# Patient Record
Sex: FEMALE | Race: WHITE | NOT HISPANIC OR LATINO | Employment: OTHER | URBAN - METROPOLITAN AREA
[De-identification: names, ages, dates, MRNs, and addresses within clinical notes are randomized per-mention and may not be internally consistent; named-entity substitution may affect disease eponyms.]

---

## 2017-02-15 ENCOUNTER — OFFICE VISIT (OUTPATIENT)
Dept: CARDIOLOGY | Facility: MEDICAL CENTER | Age: 72
End: 2017-02-15
Payer: MEDICARE

## 2017-02-15 VITALS
HEART RATE: 88 BPM | BODY MASS INDEX: 24.73 KG/M2 | OXYGEN SATURATION: 91 % | SYSTOLIC BLOOD PRESSURE: 150 MMHG | DIASTOLIC BLOOD PRESSURE: 62 MMHG | WEIGHT: 131 LBS | HEIGHT: 61 IN

## 2017-02-15 DIAGNOSIS — J44.9 CHRONIC OBSTRUCTIVE PULMONARY DISEASE, UNSPECIFIED COPD TYPE (HCC): ICD-10-CM

## 2017-02-15 DIAGNOSIS — I10 ESSENTIAL HYPERTENSION: ICD-10-CM

## 2017-02-15 DIAGNOSIS — Z82.49 FAMILY HISTORY OF EARLY CAD: ICD-10-CM

## 2017-02-15 PROCEDURE — 1036F TOBACCO NON-USER: CPT | Performed by: INTERNAL MEDICINE

## 2017-02-15 PROCEDURE — G8484 FLU IMMUNIZE NO ADMIN: HCPCS | Performed by: INTERNAL MEDICINE

## 2017-02-15 PROCEDURE — G8432 DEP SCR NOT DOC, RNG: HCPCS | Performed by: INTERNAL MEDICINE

## 2017-02-15 PROCEDURE — 3017F COLORECTAL CA SCREEN DOC REV: CPT | Mod: 8P | Performed by: INTERNAL MEDICINE

## 2017-02-15 PROCEDURE — 1100F PTFALLS ASSESS-DOCD GE2>/YR: CPT | Performed by: INTERNAL MEDICINE

## 2017-02-15 PROCEDURE — G8420 CALC BMI NORM PARAMETERS: HCPCS | Performed by: INTERNAL MEDICINE

## 2017-02-15 PROCEDURE — 4040F PNEUMOC VAC/ADMIN/RCVD: CPT | Performed by: INTERNAL MEDICINE

## 2017-02-15 PROCEDURE — 3045F PR MOST RECENT HEMOGLOBIN A1C LEVEL 7.0-9.0%: CPT | Performed by: INTERNAL MEDICINE

## 2017-02-15 PROCEDURE — 0518F FALL PLAN OF CARE DOCD: CPT | Mod: 8P | Performed by: INTERNAL MEDICINE

## 2017-02-15 PROCEDURE — 3014F SCREEN MAMMO DOC REV: CPT | Performed by: INTERNAL MEDICINE

## 2017-02-15 PROCEDURE — 99214 OFFICE O/P EST MOD 30 MIN: CPT | Performed by: INTERNAL MEDICINE

## 2017-02-15 PROCEDURE — 3288F FALL RISK ASSESSMENT DOCD: CPT | Performed by: INTERNAL MEDICINE

## 2017-02-15 RX ORDER — AMLODIPINE BESYLATE 10 MG/1
10 TABLET ORAL DAILY
Qty: 30 TAB | Refills: 11 | Status: SHIPPED | OUTPATIENT
Start: 2017-02-15 | End: 2017-07-19

## 2017-02-15 ASSESSMENT — ENCOUNTER SYMPTOMS
SHORTNESS OF BREATH: 0
LOSS OF CONSCIOUSNESS: 0
CLAUDICATION: 0
EYE PAIN: 0
EYE DISCHARGE: 0
WEIGHT LOSS: 0
BLURRED VISION: 0
DEPRESSION: 0
PND: 0
NAUSEA: 0
HALLUCINATIONS: 0
HEADACHES: 0
FALLS: 0
MYALGIAS: 0
DIZZINESS: 0
DOUBLE VISION: 0
SPEECH CHANGE: 0
CHILLS: 0
ABDOMINAL PAIN: 0
VOMITING: 0
ORTHOPNEA: 0
BLOOD IN STOOL: 0
PALPITATIONS: 0
BRUISES/BLEEDS EASILY: 0
COUGH: 0
FEVER: 0
SENSORY CHANGE: 0

## 2017-02-15 NOTE — Clinical Note
"     [facility]   [address], [address]  [place] [address]  Phone: [phone]  Fax: [phone]              Kenia Oconnell  1945    Encounter Date: 2/15/2017    Elyse Flores M.D.          PROGRESS NOTE:  Subjective:   Kenia Oconnell is a 71 y.o. female who presents today for cardiac care of CAD, HTN, HLP, DM.  In the interim, patient has been doing well without having any symptoms. Patient denies having chest pain, dyspnea, palpitation, presyncope, syncope episodes. Able to climb up at least 2 flights of stairs.     Past Medical History   Diagnosis Date   • Type II or unspecified type diabetes mellitus without mention of complication, not stated as uncontrolled 6/11/2009   • HTN 6/11/2009   • Hyperlipidemia LDL goal < 100 6/11/2009   • Asthma, exogenous 6/11/2009   • Depression 6/11/2009   • History of Gout when on HCTZ 6/11/2009   • Glaucoma suspect 6/11/2009   • Hyperplastic colon polyp 12/7/2007   • Cervical High Risk HPV Test Positive 10/18/2006   • COPD (chronic obstructive pulmonary disease) (CMS-[facility]) 6/11/2009   • Vitamin d deficiency 4/2/2010   • Personal history of allergy to eggs 4/2/2010   • Allergic rhinitis 4/19/2010   • Hypertriglyceridemia 3/30/2012   • HDL deficiency 3/30/2012   • DDD (degenerative disc disease), lumbar 8/14/2013   • Abnormal stress electrocardiogram test using treadmill 8/11/2014   • Bilateral ocular hypertension 7/6/2015   • Posterior vitreous detachment of right eye 7/6/2015     Past Surgical History   Procedure Laterality Date   • Tonsillectomy  1953   • Sinusotomies  1999   • Dilation and curettage  1981   • Eye surgery  1997     \"laser for eye pressure\"   • Eye surgery Bilateral 2013     Dr. Garrido     Family History   Problem Relation Age of Onset   • Heart Disease Father      d. MI 50s   • Lung Disease Mother      d. lung dz   • Cancer Paternal Grandfather      Black Lung   • Diabetes Paternal Grandmother   "   • Diabetes Other      Paternal Cousin DM1   • Heart Disease Sister 59     mi and CVA -      History   Smoking status   • Former Smoker -- 1.00 packs/day for 29 years   • Types: Cigarettes   • Start date: 01/01/1966   • Quit date: 01/01/1995   Smokeless tobacco   • Former User     Allergies   Allergen Reactions   • Pcn [Penicillins] Rash   • Sulfa Drugs Rash   • Toprol Xl [Metoprolol]      Fatigue       Outpatient Encounter Prescriptions as of 2/15/2017   Medication Sig Dispense Refill   • amlodipine (NORVASC) 10 MG Tab Take 1 Tab by mouth every day. 30 Tab 11   • JANUVIA 100 MG Tab TAKE ONE TABLET BY MOUTH DAILY 90 Tab 1   • sertraline (ZOLOFT) 100 MG Tab TAKE ONE TABLET BY MOUTH DAILY 90 Tab 1   • clindamycin (CLEOCIN) 300 MG Cap      • hydrocodone-acetaminophen (NORCO) 5-325 MG Tab per tablet Take 1 Tab by mouth every 6 hours as needed. 15 Tab 0   • lisinopril (PRINIVIL, ZESTRIL) 40 MG tablet Take 1 Tab by mouth every day. 90 Tab 2   • glucose blood (ONE TOUCH ULTRA TEST) strip USE TO TEST BLOOD SUGAR DAILY 100 Strip 11   • ranitidine (ZANTAC) 150 MG Tab Take 150 mg by mouth 2 times a day.     • diltiazem CD (CARDIZEM CD) 300 MG CAPSULE SR 24 HR Take 1 Cap by mouth every day. 90 Cap 2   • CRESTOR 10 MG Tab TAKE 1 TABLET BY MOUTH DAILY. 30 Tab 5   • cyclobenzaprine (FLEXERIL) 10 MG Tab TAKE 1/2 TO 1 TABLET AT BEDTIME AS NEEDED FOR PAIN/SPASMS.  30 Tab 5   • Mometasone Furo-Formoterol Fum (DULERA) 100-5 MCG/ACT Aerosol Inhale 1 Puff by mouth 2 Times a Day. 3 Inhaler 3   • albuterol (VENTOLIN OR PROVENTIL) 108 (90 BASE) MCG/ACT Aero Soln inhalation aerosol Inhale 1-2 Puffs by mouth every 6 hours as needed for Shortness of Breath. 1 Inhaler 3   • metformin ER (GLUCOPHAGE XR) 500 MG TB24 TAKE 2 TABLETS BY MOUTH TWICE DAILY.  (Patient taking differently: taking 4 tabs in one time per day) 60 Tab 5   • Ibuprofen (ADVIL) 200 MG CAPS Take  by mouth.     • LIFESCAN FINEPOINT LANCETS MISC USE TO TEST BLOOD SUGAR DAILY 100  Each 11   • fluticasone (FLONASE) 50 MCG/ACT nasal spray USE ONE SPRAY IN EACH NOSTRIL DAILY 1 Bottle 5   • clobetasol (TEMOVATE) 0.05 % OINT Apply  to affected area(s). Apply twice daily to affected area for 1 week then once daily for 1 week then as needed for rash 30 g 0   • cetirizine (ZYRTEC) 10 MG TABS Take 10 mg by mouth every day.       • vitamin D (CHOLECALCIFEROL) 1000 UNIT TABS Take 1,000 Units by mouth every day.     • ASPIRIN 81 MG PO TBEC Take 1 Tab by mouth every day. 30 Each 11   • rosuvastatin (CRESTOR) 10 MG Tab      • [DISCONTINUED] metoprolol SR (TOPROL XL) 50 MG TABLET SR 24 HR Take 1 Tab by mouth every day. (Patient not taking: Reported on 2/15/2017) 30 Tab 11   • metformin ER (GLUCOPHAGE XR) 500 MG TABLET SR 24 HR TAKE FOUR TABLETS (2000MG)  BY MOUTH DAILY 360 Tab 2   • glipiZIDE (GLUCOTROL) 10 MG Tab Take 1 Tab by mouth 2 times a day. 180 Tab 2   • NON SPECIFIED by Other route. 1. Lancettes. Use daily #100 RF x 1 year  2. Glucometer test strips. Use daily #100 RF x 1 year.  Dx: 250.00 100 Each 1     No facility-administered encounter medications on file as of 2/15/2017.     Review of Systems   Constitutional: Negative for fever, chills, weight loss and malaise/fatigue.   HENT: Negative for ear discharge, ear pain, hearing loss and nosebleeds.    Eyes: Negative for blurred vision, double vision, pain and discharge.   Respiratory: Negative for cough and shortness of breath.    Cardiovascular: Negative for chest pain, palpitations, orthopnea, claudication, leg swelling and PND.   Gastrointestinal: Negative for nausea, vomiting, abdominal pain, blood in stool and melena.   Genitourinary: Negative for dysuria and hematuria.   Musculoskeletal: Negative for myalgias, joint pain and falls.   Skin: Negative for itching and rash.   Neurological: Negative for dizziness, sensory change, speech change, loss of consciousness and headaches.   Endo/Heme/Allergies: Negative for environmental allergies. Does  "not bruise/bleed easily.   Psychiatric/Behavioral: Negative for depression, suicidal ideas and hallucinations.        Objective:   /62 mmHg  Pulse 88  Ht 1.549 m (5' 0.98\")  Wt 59.421 kg (131 lb)  BMI 24.76 kg/m2  SpO2 91%  LMP 01/01/2000    Physical Exam   Constitutional: She is oriented to person, place, and time. She appears well-developed and well-nourished.   HENT:   Head: Normocephalic and atraumatic.   Eyes: EOM are normal.   Neck: No JVD present.   Cardiovascular: Normal rate, regular rhythm, normal heart sounds and intact distal pulses.  Exam reveals no gallop and no friction rub.    No murmur heard.  Pulmonary/Chest: No respiratory distress. She has no wheezes. She has no rales. She exhibits no tenderness.   Abdominal: She exhibits no distension. There is no tenderness. There is no rebound and no guarding.   Musculoskeletal: She exhibits no edema or tenderness.   Lymphadenopathy:     She has no cervical adenopathy.   Neurological: She is alert and oriented to person, place, and time.   Skin: Skin is dry.   Psychiatric: She has a normal mood and affect.   Nursing note and vitals reviewed.      Assessment:     1. Essential hypertension  amlodipine (NORVASC) 10 MG Tab   2. Family history of early CAD  amlodipine (NORVASC) 10 MG Tab   3. Chronic obstructive pulmonary disease, unspecified COPD type (CMS-HCC)  amlodipine (NORVASC) 10 MG Tab   4. Uncontrolled type 2 diabetes mellitus with other specified complication (CMS-HCC)         Medical Decision Making:  Today's Assessment / Status / Plan:     Blood pressure is elevated.  Patient was not able to tolerate Toprol-XL therapy.  We'll start patient on amlodipine 10 mg by mouth once a day.  Continue lisinopril therapy.  Optimize diabetes control with primary care provider.    I will see patient back in clinic with lab tests and studies results in 6 months.    I thank you Dr. Livingston for referring patient to our Cardiology Clinic today.        Cailin DIGGS" CADY Livingston  [address]  [place] [id number]  VIA In Basket

## 2017-02-15 NOTE — MR AVS SNAPSHOT
"        Kenia Boucher Anish   2/15/2017 2:45 PM   Office Visit   MRN: [id number]    Department:  [facility]   Dept Phone:  [phone]    Description:  Female : 1945   Provider:  Elyse Flores M.D.           Reason for Visit     New Patient           Allergies as of 2/15/2017     Allergen Noted Reactions    Pcn [Penicillins] 2009   Rash    Sulfa Drugs 2009   Rash    Toprol Xl [Metoprolol] 02/15/2017       Fatigue        You were diagnosed with     Essential hypertension   [5456596]       Family history of early CAD   [291350]       Chronic obstructive pulmonary disease, unspecified COPD type (CMS-HCC)   [6133875]       Uncontrolled type 2 diabetes mellitus with other specified complication (CMS-HCC)   [0178288]         Vital Signs     Blood Pressure Pulse Height Weight Body Mass Index Oxygen Saturation    150/62 mmHg 88 1.549 m (5' 0.98\") 59.421 kg (131 lb) 24.76 kg/m2 91%    Last Menstrual Period Smoking Status                2000 Former Smoker          Basic Information     Date Of Birth Sex Race Ethnicity Preferred Language    1945 Female White Non- English      Problem List              ICD-10-CM Priority Class Noted - Resolved    Preventative health care Z00.00   2009 - Present    DM (diabetes mellitus), type 2, uncontrolled (CMS-HCC) E11.65   2009 - Present    Hyperlipidemia with target LDL less than 100 E78.5   2009 - Present    COPD (chronic obstructive pulmonary disease) (CMS-HCC) J44.9   2009 - Present    Major depressive disorder F32.9   2009 - Present    History of Gout when on HCTZ Z87.39   2009 - Present    Glaucoma suspect H40.009   2009 - Present    Hyperplastic colon polyp K63.5   2007 - Present    Cervical High Risk HPV Test Positive R87.810   10/18/2006 - Present    HTN (hypertension) I10   2009 - Present    Vitamin D insufficiency E55.9   2010 - Present    Seasonal allergic rhinitis J30.2   " 4/19/2010 - Present    Lichen sclerosus et atrophicus of the vulva N90.4   4/20/2011 - Present    Hypertriglyceridemia E78.1   3/30/2012 - Present    HDL deficiency E78.6   3/30/2012 - Present    DDD (degenerative disc disease), lumbar M51.36   8/14/2013 - Present    Abnormal CXR R93.8   11/22/2013 - Present    Abnormal cardiovascular stress test R94.39   8/6/2014 - Present    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   7/6/2015 - Present    Posterior vitreous detachment of right eye H43.811   7/6/2015 - Present    DM type 2 with diabetic dyslipidemia (CMS-HCC) E11.69, E78.5   2/23/2016 - Present    Elevated TSH R94.6   7/1/2016 - Present      Health Maintenance        Date Due Completion Dates    IMM INFLUENZA (1) 9/1/2016 10/22/2015, 10/13/2014, 10/22/2013, 11/12/2012, 1/5/2011    A1C SCREENING 12/27/2016 6/27/2016, 5/3/2016, 2/16/2016, 6/18/2015, 2/27/2015, 2/19/2015, 10/20/2014, 6/23/2014, 3/21/2014, 8/7/2013, 5/6/2013, 10/30/2012, 7/11/2011, 12/27/2010, 6/23/2010, 3/23/2010, 12/21/2009, 6/9/2009, 12/9/2008    RETINAL SCREENING 2/3/2017 2/3/2016, 3/11/2014    FASTING LIPID PROFILE 2/16/2017 2/16/2016, 6/18/2015, 10/20/2014, 3/21/2014, 11/14/2013, 10/30/2012, 3/26/2012, 7/11/2011, 6/23/2010, 12/21/2009, 6/9/2009, 12/9/2008    URINE ACR / MICROALBUMIN 2/16/2017 2/16/2016, 2/19/2015, 3/21/2014, 8/7/2013, 5/6/2013, 3/26/2012, 7/11/2011, 6/23/2010, 12/21/2009, 6/9/2009    SERUM CREATININE 2/16/2017 2/16/2016, 2/16/2016, 6/18/2015, 2/19/2015, 10/20/2014, 3/21/2014, 11/25/2013, 11/15/2013, 5/6/2013, 10/30/2012, 3/26/2012, 7/11/2011, 12/27/2010, 3/23/2010, 6/9/2009, 12/9/2008    DIABETES MONOFILAMENT / LE EXAM 2/23/2017 2/23/2016, 6/26/2015, 3/31/2014, 3/31/2014 (Done), 5/14/2013 (Done), 11/12/2012 (Done), 3/30/2012 (Done)    Override on 3/31/2014: Done    Override on 5/14/2013: Done    Override on 11/12/2012: Done    Override on 3/30/2012: Done    COLONOSCOPY 12/7/2017 12/7/2007 (Done)    Override on 12/7/2007: Done     MAMMOGRAM 5/12/2018 5/12/2016, 7/12/2013, 8/9/2011, 7/13/2010, 7/13/2010, 7/9/2009, 7/9/2009    BONE DENSITY 7/12/2018 7/12/2013, 7/9/2009    IMM DTaP/Tdap/Td Vaccine (2 - Td) 2/27/2025 2/27/2015            Current Immunizations     13-VALENT PCV PREVNAR 5/9/2016    Influenza TIV (IM) 10/22/2013, 11/12/2012, 1/5/2011    Influenza Vaccine Adult HD 10/22/2015    Influenza Vaccine Quad Inj (Pf) 10/13/2014    Pneumococcal polysaccharide vaccine (PPSV-23) 11/12/2012    SHINGLES VACCINE 7/2/2010    Tdap Vaccine 2/27/2015      Below and/or attached are the medications your provider expects you to take. Review all of your home medications and newly ordered medications with your provider and/or pharmacist. Follow medication instructions as directed by your provider and/or pharmacist. Please keep your medication list with you and share with your provider. Update the information when medications are discontinued, doses are changed, or new medications (including over-the-counter products) are added; and carry medication information at all times in the event of emergency situations     Allergies:  PCN - Rash     SULFA DRUGS - Rash     TOPROL XL - (reactions not documented)               Medications  Valid as of: February 15, 2017 -  2:56 PM    Generic Name Brand Name Tablet Size Instructions for use    Albuterol Sulfate (Aero Soln) albuterol 108 (90 BASE) MCG/ACT Inhale 1-2 Puffs by mouth every 6 hours as needed for Shortness of Breath.        AmLODIPine Besylate (Tab) NORVASC 10 MG Take 1 Tab by mouth every day.        Aspirin (Tablet Delayed Response) aspirin 81 MG Take 1 Tab by mouth every day.        Cetirizine HCl (Tab) ZYRTEC 10 MG Take 10 mg by mouth every day.          Cholecalciferol (Tab) cholecalciferol 1000 UNIT Take 1,000 Units by mouth every day.        Clindamycin HCl (Cap) CLEOCIN 300 MG         Clobetasol Propionate (Ointment) TEMOVATE 0.05 % Apply  to affected area(s). Apply twice daily to affected area for 1  week then once daily for 1 week then as needed for rash        Cyclobenzaprine HCl (Tab) FLEXERIL 10 MG TAKE 1/2 TO 1 TABLET AT BEDTIME AS NEEDED FOR PAIN/SPASMS.         DiltiaZEM HCl Coated Beads (CAPSULE SR 24 HR) CARDIZEM  MG Take 1 Cap by mouth every day.        Fluticasone Propionate (Suspension) FLONASE 50 MCG/ACT USE ONE SPRAY IN EACH NOSTRIL DAILY        GlipiZIDE (Tab) GLUCOTROL 10 MG Take 1 Tab by mouth 2 times a day.        Glucose Blood (Strip) glucose blood  USE TO TEST BLOOD SUGAR DAILY        Hydrocodone-Acetaminophen (Tab) NORCO 5-325 MG Take 1 Tab by mouth every 6 hours as needed.        Ibuprofen (Cap) Ibuprofen 200 MG Take  by mouth.        Lancets (Misc) [employer] LANCETS  USE TO TEST BLOOD SUGAR DAILY        Lisinopril (Tab) PRINIVIL, ZESTRIL 40 MG Take 1 Tab by mouth every day.        MetFORMIN HCl (TABLET SR 24 HR) GLUCOPHAGE  MG TAKE 2 TABLETS BY MOUTH TWICE DAILY.         MetFORMIN HCl (TABLET SR 24 HR) GLUCOPHAGE  MG TAKE FOUR TABLETS (2000MG)  BY MOUTH DAILY        Mometasone Furo-Formoterol Fum (Aerosol) Mometasone Furo-Formoterol Fum 100-5 MCG/ACT Inhale 1 Puff by mouth 2 Times a Day.        NON SPECIFIED   by Other route. 1. Lancettes. Use daily #100 RF x 1 year  2. Glucometer test strips. Use daily #100 RF x 1 year.  Dx: 250.00        RaNITidine HCl (Tab) ZANTAC 150 MG Take 150 mg by mouth 2 times a day.        Rosuvastatin Calcium (Tab) CRESTOR 10 MG TAKE 1 TABLET BY MOUTH DAILY.        Rosuvastatin Calcium (Tab) CRESTOR 10 MG         Sertraline HCl (Tab) ZOLOFT 100 MG TAKE ONE TABLET BY MOUTH DAILY        SITagliptin Phosphate (Tab) JANUVIA 100 MG TAKE ONE TABLET BY MOUTH DAILY        .                 Medicines prescribed today were sent to:     [id number] - [place] - [address]    [address] [address]    Phone: [phone] Fax: [phone]    Open 24 Hours?: No    [facility] - [facility]  [address]    [address] [place] [address]    Phone: [phone] Fax: [phone]    Open 24 Hours?: No      Medication refill instructions:       If your prescription bottle indicates you have medication refills left, it is not necessary to call your provider’s office. Please contact your pharmacy and they will refill your medication.    If your prescription bottle indicates you do not have any refills left, you may request refills at any time through one of the following ways: The online [employer] system (except Urgent Care), by calling your provider’s office, or by asking your pharmacy to contact your provider’s office with a refill request. Medication refills are processed only during regular business hours and may not be available until the next business day. Your provider may request additional information or to have a follow-up visit with you prior to refilling your medication.   *Please Note: Medication refills are assigned a new Rx number when refilled electronically. Your pharmacy may indicate that no refills were authorized even though a new prescription for the same medication is available at the pharmacy. Please request the medicine by name with the pharmacy before contacting your provider for a refill.        Other Notes About Your Plan                    [employer] Access Code: Activation code not generated  Current [employer] Status: Active

## 2017-02-15 NOTE — PROGRESS NOTES
"Subjective:   Kenia Oconnell is a 71 y.o. female who presents today for cardiac care of CAD, HTN, HLP, DM.  In the interim, patient has been doing well without having any symptoms. Patient denies having chest pain, dyspnea, palpitation, presyncope, syncope episodes. Able to climb up at least 2 flights of stairs.     Past Medical History   Diagnosis Date   • Type II or unspecified type diabetes mellitus without mention of complication, not stated as uncontrolled 6/11/2009   • HTN 6/11/2009   • Hyperlipidemia LDL goal < 100 6/11/2009   • Asthma, exogenous 6/11/2009   • Depression 6/11/2009   • History of Gout when on HCTZ 6/11/2009   • Glaucoma suspect 6/11/2009   • Hyperplastic colon polyp 12/7/2007   • Cervical High Risk HPV Test Positive 10/18/2006   • COPD (chronic obstructive pulmonary disease) (CMS-[facility]) 6/11/2009   • Vitamin d deficiency 4/2/2010   • Personal history of allergy to eggs 4/2/2010   • Allergic rhinitis 4/19/2010   • Hypertriglyceridemia 3/30/2012   • HDL deficiency 3/30/2012   • DDD (degenerative disc disease), lumbar 8/14/2013   • Abnormal stress electrocardiogram test using treadmill 8/11/2014   • Bilateral ocular hypertension 7/6/2015   • Posterior vitreous detachment of right eye 7/6/2015     Past Surgical History   Procedure Laterality Date   • Tonsillectomy  1953   • Sinusotomies  1999   • Dilation and curettage  1981   • Eye surgery  1997     \"laser for eye pressure\"   • Eye surgery Bilateral 2013     Dr. Garrido     Family History   Problem Relation Age of Onset   • Heart Disease Father      d. MI 50s   • Lung Disease Mother      d. lung dz   • Cancer Paternal Grandfather      Black Lung   • Diabetes Paternal Grandmother    • Diabetes Other      Paternal Cousin DM1   • Heart Disease Sister 59     mi and CVA -      History   Smoking status   • Former Smoker -- 1.00 packs/day for 29 years   • Types: Cigarettes   • Start date: 01/01/1966   • Quit date: 01/01/1995   Smokeless tobacco   • Former " User     Allergies   Allergen Reactions   • Pcn [Penicillins] Rash   • Sulfa Drugs Rash   • Toprol Xl [Metoprolol]      Fatigue       Outpatient Encounter Prescriptions as of 2/15/2017   Medication Sig Dispense Refill   • amlodipine (NORVASC) 10 MG Tab Take 1 Tab by mouth every day. 30 Tab 11   • JANUVIA 100 MG Tab TAKE ONE TABLET BY MOUTH DAILY 90 Tab 1   • sertraline (ZOLOFT) 100 MG Tab TAKE ONE TABLET BY MOUTH DAILY 90 Tab 1   • clindamycin (CLEOCIN) 300 MG Cap      • hydrocodone-acetaminophen (NORCO) 5-325 MG Tab per tablet Take 1 Tab by mouth every 6 hours as needed. 15 Tab 0   • lisinopril (PRINIVIL, ZESTRIL) 40 MG tablet Take 1 Tab by mouth every day. 90 Tab 2   • glucose blood (ONE TOUCH ULTRA TEST) strip USE TO TEST BLOOD SUGAR DAILY 100 Strip 11   • ranitidine (ZANTAC) 150 MG Tab Take 150 mg by mouth 2 times a day.     • diltiazem CD (CARDIZEM CD) 300 MG CAPSULE SR 24 HR Take 1 Cap by mouth every day. 90 Cap 2   • CRESTOR 10 MG Tab TAKE 1 TABLET BY MOUTH DAILY. 30 Tab 5   • cyclobenzaprine (FLEXERIL) 10 MG Tab TAKE 1/2 TO 1 TABLET AT BEDTIME AS NEEDED FOR PAIN/SPASMS.  30 Tab 5   • Mometasone Furo-Formoterol Fum (DULERA) 100-5 MCG/ACT Aerosol Inhale 1 Puff by mouth 2 Times a Day. 3 Inhaler 3   • albuterol (VENTOLIN OR PROVENTIL) 108 (90 BASE) MCG/ACT Aero Soln inhalation aerosol Inhale 1-2 Puffs by mouth every 6 hours as needed for Shortness of Breath. 1 Inhaler 3   • metformin ER (GLUCOPHAGE XR) 500 MG TB24 TAKE 2 TABLETS BY MOUTH TWICE DAILY.  (Patient taking differently: taking 4 tabs in one time per day) 60 Tab 5   • Ibuprofen (ADVIL) 200 MG CAPS Take  by mouth.     • [employer] FINEPOINT LANCETS MISC USE TO TEST BLOOD SUGAR DAILY 100 Each 11   • fluticasone (FLONASE) 50 MCG/ACT nasal spray USE ONE SPRAY IN EACH NOSTRIL DAILY 1 Bottle 5   • clobetasol (TEMOVATE) 0.05 % OINT Apply  to affected area(s). Apply twice daily to affected area for 1 week then once daily for 1 week then as needed for rash 30 g 0  "  • cetirizine (ZYRTEC) 10 MG TABS Take 10 mg by mouth every day.       • vitamin D (CHOLECALCIFEROL) 1000 UNIT TABS Take 1,000 Units by mouth every day.     • ASPIRIN 81 MG PO TBEC Take 1 Tab by mouth every day. 30 Each 11   • rosuvastatin (CRESTOR) 10 MG Tab      • [DISCONTINUED] metoprolol SR (TOPROL XL) 50 MG TABLET SR 24 HR Take 1 Tab by mouth every day. (Patient not taking: Reported on 2/15/2017) 30 Tab 11   • metformin ER (GLUCOPHAGE XR) 500 MG TABLET SR 24 HR TAKE FOUR TABLETS (2000MG)  BY MOUTH DAILY 360 Tab 2   • glipiZIDE (GLUCOTROL) 10 MG Tab Take 1 Tab by mouth 2 times a day. 180 Tab 2   • NON SPECIFIED by Other route. 1. Lancettes. Use daily #100 RF x 1 year  2. Glucometer test strips. Use daily #100 RF x 1 year.  Dx: 250.00 100 Each 1     No facility-administered encounter medications on file as of 2/15/2017.     Review of Systems   Constitutional: Negative for fever, chills, weight loss and malaise/fatigue.   HENT: Negative for ear discharge, ear pain, hearing loss and nosebleeds.    Eyes: Negative for blurred vision, double vision, pain and discharge.   Respiratory: Negative for cough and shortness of breath.    Cardiovascular: Negative for chest pain, palpitations, orthopnea, claudication, leg swelling and PND.   Gastrointestinal: Negative for nausea, vomiting, abdominal pain, blood in stool and melena.   Genitourinary: Negative for dysuria and hematuria.   Musculoskeletal: Negative for myalgias, joint pain and falls.   Skin: Negative for itching and rash.   Neurological: Negative for dizziness, sensory change, speech change, loss of consciousness and headaches.   Endo/Heme/Allergies: Negative for environmental allergies. Does not bruise/bleed easily.   Psychiatric/Behavioral: Negative for depression, suicidal ideas and hallucinations.        Objective:   /62 mmHg  Pulse 88  Ht 1.549 m (5' 0.98\")  Wt 59.421 kg (131 lb)  BMI 24.76 kg/m2  SpO2 91%  LMP 01/01/2000    Physical Exam "   Constitutional: She is oriented to person, place, and time. She appears well-developed and well-nourished.   HENT:   Head: Normocephalic and atraumatic.   Eyes: EOM are normal.   Neck: No JVD present.   Cardiovascular: Normal rate, regular rhythm, normal heart sounds and intact distal pulses.  Exam reveals no gallop and no friction rub.    No murmur heard.  Pulmonary/Chest: No respiratory distress. She has no wheezes. She has no rales. She exhibits no tenderness.   Abdominal: She exhibits no distension. There is no tenderness. There is no rebound and no guarding.   Musculoskeletal: She exhibits no edema or tenderness.   Lymphadenopathy:     She has no cervical adenopathy.   Neurological: She is alert and oriented to person, place, and time.   Skin: Skin is dry.   Psychiatric: She has a normal mood and affect.   Nursing note and vitals reviewed.      Assessment:     1. Essential hypertension  amlodipine (NORVASC) 10 MG Tab   2. Family history of early CAD  amlodipine (NORVASC) 10 MG Tab   3. Chronic obstructive pulmonary disease, unspecified COPD type (CMS-HCC)  amlodipine (NORVASC) 10 MG Tab   4. Uncontrolled type 2 diabetes mellitus with other specified complication (CMS-[facility])         Medical Decision Making:  Today's Assessment / Status / Plan:     Blood pressure is elevated.  Patient was not able to tolerate Toprol-XL therapy.  We'll start patient on amlodipine 10 mg by mouth once a day.  Continue lisinopril therapy.  Optimize diabetes control with primary care provider.    I will see patient back in clinic with lab tests and studies results in 6 months.    I thank you Dr. Livingston for referring patient to our Cardiology Clinic today.

## 2017-02-28 ENCOUNTER — HOSPITAL ENCOUNTER (OUTPATIENT)
Facility: MEDICAL CENTER | Age: 72
End: 2017-02-28
Attending: PHYSICIAN ASSISTANT
Payer: MEDICARE

## 2017-02-28 ENCOUNTER — OFFICE VISIT (OUTPATIENT)
Dept: MEDICAL GROUP | Age: 72
End: 2017-02-28
Payer: MEDICARE

## 2017-02-28 VITALS
OXYGEN SATURATION: 94 % | BODY MASS INDEX: 24.05 KG/M2 | DIASTOLIC BLOOD PRESSURE: 66 MMHG | SYSTOLIC BLOOD PRESSURE: 112 MMHG | TEMPERATURE: 98.4 F | WEIGHT: 127.4 LBS | HEIGHT: 61 IN | HEART RATE: 98 BPM

## 2017-02-28 DIAGNOSIS — E11.69 DM TYPE 2 WITH DIABETIC DYSLIPIDEMIA (HCC): ICD-10-CM

## 2017-02-28 DIAGNOSIS — N76.0 ACUTE VAGINITIS: ICD-10-CM

## 2017-02-28 DIAGNOSIS — E78.5 DM TYPE 2 WITH DIABETIC DYSLIPIDEMIA (HCC): ICD-10-CM

## 2017-02-28 DIAGNOSIS — I10 ESSENTIAL HYPERTENSION: ICD-10-CM

## 2017-02-28 DIAGNOSIS — E55.9 VITAMIN D INSUFFICIENCY: ICD-10-CM

## 2017-02-28 DIAGNOSIS — R79.89 ELEVATED TSH: ICD-10-CM

## 2017-02-28 DIAGNOSIS — J44.9 CHRONIC OBSTRUCTIVE PULMONARY DISEASE, UNSPECIFIED COPD TYPE (HCC): ICD-10-CM

## 2017-02-28 LAB
CREAT UR-MCNC: 30.7 MG/DL
HBA1C MFR BLD: 12.4 % (ref ?–5.8)
INT CON NEG: NEGATIVE
INT CON POS: POSITIVE
MICROALBUMIN UR-MCNC: <0.7 MG/DL
MICROALBUMIN/CREAT UR: NORMAL MG/G (ref 0–30)

## 2017-02-28 PROCEDURE — G8432 DEP SCR NOT DOC, RNG: HCPCS | Performed by: PHYSICIAN ASSISTANT

## 2017-02-28 PROCEDURE — 3014F SCREEN MAMMO DOC REV: CPT | Performed by: PHYSICIAN ASSISTANT

## 2017-02-28 PROCEDURE — 99000 SPECIMEN HANDLING OFFICE-LAB: CPT | Performed by: PHYSICIAN ASSISTANT

## 2017-02-28 PROCEDURE — 3017F COLORECTAL CA SCREEN DOC REV: CPT | Mod: 8P | Performed by: PHYSICIAN ASSISTANT

## 2017-02-28 PROCEDURE — 3046F HEMOGLOBIN A1C LEVEL >9.0%: CPT | Performed by: PHYSICIAN ASSISTANT

## 2017-02-28 PROCEDURE — 1036F TOBACCO NON-USER: CPT | Performed by: PHYSICIAN ASSISTANT

## 2017-02-28 PROCEDURE — 1100F PTFALLS ASSESS-DOCD GE2>/YR: CPT | Performed by: PHYSICIAN ASSISTANT

## 2017-02-28 PROCEDURE — G8482 FLU IMMUNIZE ORDER/ADMIN: HCPCS | Performed by: PHYSICIAN ASSISTANT

## 2017-02-28 PROCEDURE — 82043 UR ALBUMIN QUANTITATIVE: CPT

## 2017-02-28 PROCEDURE — 3288F FALL RISK ASSESSMENT DOCD: CPT | Performed by: PHYSICIAN ASSISTANT

## 2017-02-28 PROCEDURE — 4040F PNEUMOC VAC/ADMIN/RCVD: CPT | Performed by: PHYSICIAN ASSISTANT

## 2017-02-28 PROCEDURE — 82570 ASSAY OF URINE CREATININE: CPT

## 2017-02-28 PROCEDURE — 0518F FALL PLAN OF CARE DOCD: CPT | Mod: 8P | Performed by: PHYSICIAN ASSISTANT

## 2017-02-28 PROCEDURE — 83036 HEMOGLOBIN GLYCOSYLATED A1C: CPT | Performed by: PHYSICIAN ASSISTANT

## 2017-02-28 PROCEDURE — 99214 OFFICE O/P EST MOD 30 MIN: CPT | Performed by: PHYSICIAN ASSISTANT

## 2017-02-28 PROCEDURE — G8420 CALC BMI NORM PARAMETERS: HCPCS | Performed by: PHYSICIAN ASSISTANT

## 2017-02-28 RX ORDER — FLUCONAZOLE 150 MG/1
TABLET ORAL
Qty: 2 TAB | Refills: 0 | Status: SHIPPED | OUTPATIENT
Start: 2017-02-28 | End: 2017-03-03

## 2017-02-28 RX ORDER — GLIPIZIDE 10 MG/1
10 TABLET ORAL 2 TIMES DAILY
Qty: 180 TAB | Refills: 2 | Status: SHIPPED | OUTPATIENT
Start: 2017-02-28 | End: 2017-12-28 | Stop reason: SDUPTHER

## 2017-02-28 NOTE — PROGRESS NOTES
"Subjective:     Chief Complaint   Patient presents with   • Diabetes Mellitus     Rx refill Glipizide.   • Vaginitis     Vaginal discharge. x 1 month. Itchy.   • Otalgia     itchy and painful     Kenia Oconnell is a 71 y.o. female here today for evaluation and management of:    DM type 2 with diabetic dyslipidemia (CMS-[facility])  Uncontrolled. Reports she has been out of glipizide for about a month \"at least\"  She didn't receive with mail order and didn't realize it.  Has been taking metformin and januvia as prescribed without side effects.  Reports hasn't been monitoring her blood sugar but checked this morning and FBS was 288  Has lost 10 lbs in the last month--very pleased with herself.  +  Polydipsia (very)  + nocturia 2-3 times. Occasionally sleeps through the night though rarely  Eye exam schedule for April. Last eye exam was in Fall.    HTN  Stable. Currently taking lisinopril 40 mg daily, diltiazem  mg daily and amlodipine 10 mg daily as directed.   She is taking baby aspirin daily.   She is not monitoring BP at home.   Denies symptoms low BP: light-headed, tunnel-vision, unusual fatigue.   Denies symptoms high BP:pounding headache, visual changes, palpitations, flushed face.   Denies medicine side effects: unusual fatigue, slow heartbeat, foot/leg swelling, cough.    COPD  Stable. Currently using inhalers as prescribed.Using her albuterol 5-6 times a year  She denies side effects.  Denies recent or current exacerbation.  Denies current shortness of breath, chest pain, or cough.  She is not on oxygen therapy.  Last PFT: 5/19/16    Vaginitis  Has had vaginal discharge and pruritus for about a month.  Monistat two doses in a row and no improvement.   DM has been uncontrolled.     Ear discomfort  reports that her ears itch during summer due to pollen. Have been itchy lately. Scratching at them a lot.   No discharge. No fever, chills, body aches. No throat pain. No nasal congestion. No cough.  No " change in hearing.      ROS   Denies any recent fevers or chills.   No nausea or vomiting.   No diarrhea.   No chest pains or shortness of breath.   No lower extremity edema.    Allergies   Allergen Reactions   • Pcn [Penicillins] Rash   • Sulfa Drugs Rash   • Toprol Xl [Metoprolol]      Fatigue         Current medicines (including changes today)  Current Outpatient Prescriptions   Medication Sig Dispense Refill   • Polyethyl Glycol-Propyl Glycol (SYSTANE FREE OP) by Ophthalmic route.     • glipiZIDE (GLUCOTROL) 10 MG Tab Take 1 Tab by mouth 2 times a day. 180 Tab 2   • amlodipine (NORVASC) 10 MG Tab Take 1 Tab by mouth every day. 30 Tab 11   • JANUVIA 100 MG Tab TAKE ONE TABLET BY MOUTH DAILY 90 Tab 1   • sertraline (ZOLOFT) 100 MG Tab TAKE ONE TABLET BY MOUTH DAILY 90 Tab 1   • metformin ER (GLUCOPHAGE XR) 500 MG TABLET SR 24 HR TAKE FOUR TABLETS (2000MG)  BY MOUTH DAILY 360 Tab 2   • lisinopril (PRINIVIL, ZESTRIL) 40 MG tablet Take 1 Tab by mouth every day. 90 Tab 2   • glucose blood (ONE TOUCH ULTRA TEST) strip USE TO TEST BLOOD SUGAR DAILY 100 Strip 11   • ranitidine (ZANTAC) 150 MG Tab Take 150 mg by mouth 2 times a day.     • diltiazem CD (CARDIZEM CD) 300 MG CAPSULE SR 24 HR Take 1 Cap by mouth every day. 90 Cap 2   • CRESTOR 10 MG Tab TAKE 1 TABLET BY MOUTH DAILY. 30 Tab 5   • cyclobenzaprine (FLEXERIL) 10 MG Tab TAKE 1/2 TO 1 TABLET AT BEDTIME AS NEEDED FOR PAIN/SPASMS.  30 Tab 5   • Mometasone Furo-Formoterol Fum (DULERA) 100-5 MCG/ACT Aerosol Inhale 1 Puff by mouth 2 Times a Day. 3 Inhaler 3   • albuterol (VENTOLIN OR PROVENTIL) 108 (90 BASE) MCG/ACT Aero Soln inhalation aerosol Inhale 1-2 Puffs by mouth every 6 hours as needed for Shortness of Breath. 1 Inhaler 3   • Ibuprofen (ADVIL) 200 MG CAPS Take  by mouth.     • [employer]IRIGACAN FINEPOINT LANCETS MISC USE TO TEST BLOOD SUGAR DAILY 100 Each 11   • fluticasone (FLONASE) 50 MCG/ACT nasal spray USE ONE SPRAY IN EACH NOSTRIL DAILY 1 Bottle 5   • clobetasol  (TEMOVATE) 0.05 % OINT Apply  to affected area(s). Apply twice daily to affected area for 1 week then once daily for 1 week then as needed for rash 30 g 0   • NON SPECIFIED by Other route. 1. Lancettes. Use daily #100 RF x 1 year  2. Glucometer test strips. Use daily #100 RF x 1 year.  Dx: 250.00 100 Each 1   • cetirizine (ZYRTEC) 10 MG TABS Take 10 mg by mouth every day.       • vitamin D (CHOLECALCIFEROL) 1000 UNIT TABS Take 1,000 Units by mouth every day.     • ASPIRIN 81 MG PO TBEC Take 1 Tab by mouth every day. 30 Each 11     No current facility-administered medications for this visit.       Patient Active Problem List    Diagnosis Date Noted   • Elevated TSH 07/01/2016   • DM type 2 with diabetic dyslipidemia (CMS-HCC) 02/23/2016   • Bilateral ocular hypertension 07/06/2015   • Posterior vitreous detachment of right eye 07/06/2015   • Abnormal cardiovascular stress test 08/06/2014   • Abnormal CXR 11/22/2013   • DDD (degenerative disc disease), lumbar 08/14/2013   • Hypertriglyceridemia 03/30/2012   • HDL deficiency 03/30/2012   • Lichen sclerosus et atrophicus of the vulva 04/20/2011   • Seasonal allergic rhinitis 04/19/2010   • Vitamin D insufficiency 04/02/2010   • HTN (hypertension) 12/18/2009   • Preventative health care 06/11/2009   • DM (diabetes mellitus), type 2, uncontrolled (CMS-HCC) 06/11/2009   • Hyperlipidemia with target LDL less than 100 06/11/2009   • COPD (chronic obstructive pulmonary disease) (CMS-HCC) 06/11/2009   • Major depressive disorder 06/11/2009   • History of Gout when on HCTZ 06/11/2009   • Glaucoma suspect 06/11/2009   • Hyperplastic colon polyp 12/07/2007   • Cervical High Risk HPV Test Positive 10/18/2006       Family History   Problem Relation Age of Onset   • Heart Disease Father      d. MI 50s   • Lung Disease Mother      d. lung dz   • Cancer Paternal Grandfather      Black Lung   • Diabetes Paternal Grandmother    • Diabetes Other      Paternal Cousin DM1   • Heart Disease  "Sister 59     mi and CVA -           Objective:     Blood pressure 112/66, pulse 98, temperature 36.9 °C (98.4 °F), height 1.549 m (5' 0.98\"), weight 57.788 kg (127 lb 6.4 oz), last menstrual period 01/01/2000, SpO2 94 %. Body mass index is 24.08 kg/(m^2).     Physical Exam:  Gen: Well developed, well nourished in no acute distress.   Skin: Pink, warm, and dry  HEENT: conjunctiva non-injected, sclera non-icteric. EOMs intact.   Nasal mucosa without edema nor erythema. No facial tenderness  Pinna normal. External canals with scabbed lesions and signs of excoriation. TM pearly gray.   Oral mucous membranes pink and moist with no lesions.  Neck: Supple, trachea midline. No adenopathy or masses in the neck or supraclavicular regions.  Lungs: Effort is normal. Clear to auscultation bilaterally with good excursion.  CV: regular rate and rhythm.  Abdomen: soft, nontender, + BS. No HSM.  No CVAT  Ext: no edema, color normal, vascularity normal, temperature normal  Monofilament testing with a 10 gram force: sensation intact: intact bilaterally  Visual Inspection: Feet without maceration, ulcers, fissures. Areas of very thick callous like papules diffusely on plantar aspect of both feet.  Pedal pulses: intact bilaterally  Alert and oriented Eye contact is good, speech goal directed, affect calm    Lab Results   Component Value Date/Time    GLYCOHEMOGLOBIN 12.4 02/28/2017 03:43 PM            Assessment and Plan:   The following treatment plan was discussed:     1. DM type 2 with diabetic dyslipidemia (CMS-HCC) - uncontrolled. Restart glipizide. Labs ordered. Urine collected to send to lab for microalbumin/cr ratio.   Monofilament completed in office.   HMR sent for eye exam.  Continue meds as prescribed.  Diabetes well controlled helping to prevent progression to CAD.     glipiZIDE (GLUCOTROL) 10 MG Tab  COMP METABOLIC PANEL   LIPID PROFILE   MICROALBUMIN CREAT RATIO URINE   POCT Hemoglobin A1C   Diabetic Monofilament LE Exam "                          2. Essential hypertension -Stable. Continue current medicines. Monitor labs regularly.    3. Chronic obstructive pulmonary disease, unspecified COPD type (CMS-HCC) -Stable. Continue current medicines. Monitor labs regularly. CBC WITH DIFFERENTIAL   4. Acute vaginitis - Pt deferred pelvic exam and self swabbed. Swab sent to lab. Rx: diflucan sent to pharmacy.  fluconazole (DIFLUCAN) 150 MG tablet    VAGINAL PATHOGENS DNA PANEL   5. Vitamin D insufficiency - labs ordered VITAMIN D,25 HYDROXY   6. Elevated TSH - labs ordered TSH WITH REFLEX TO FT4     - HM: Labs ordered. Eye records requested.  - Advised she stop scratching at her ears to prevent secondary infection. Use antihistamine if needed.  -Any change or worsening of signs or symptoms, patient encouraged to follow-up or report to emergency room for further evaluation. Patient verbalizes understanding and agrees.    Followup: Return in about 2 weeks (around 3/14/2017) for follow-up on DM with DM Nurse (DM-RN visit) with her PCP. Sooner if needed.

## 2017-02-28 NOTE — MR AVS SNAPSHOT
"        Kenia POPvalery   2017 11:30 AM   Office Visit   MRN: [id number]    Department:  [address] [place] [address]   Dept Phone:  [phone]    Description:  Female : 1945   Provider:  Karine Hollis PA-C           Reason for Visit     Diabetes Mellitus Rx refill Glipizide.    Vaginitis Vaginal discharge. x 1 month. Itchy.    Otalgia itchy and painful      Allergies as of 2017     Allergen Noted Reactions    Pcn [Penicillins] 2009   Rash    Sulfa Drugs 2009   Rash    Toprol Xl [Metoprolol] 02/15/2017       Fatigue        You were diagnosed with     DM type 2 with diabetic dyslipidemia (CMS-HCC)   [064537]       Essential hypertension   [6583240]       Chronic obstructive pulmonary disease, unspecified COPD type (CMS-HCC)   [1368132]       Vitamin D insufficiency   [520914]       Elevated TSH   [925169]       Acute vaginitis   [377117]         Vital Signs     Blood Pressure Pulse Temperature Height Weight Body Mass Index    112/66 mmHg 98 36.9 °C (98.4 °F) 1.549 m (5' 0.98\") 57.788 kg (127 lb 6.4 oz) 24.08 kg/m2    Oxygen Saturation Last Menstrual Period Smoking Status             94% 2000 Former Smoker         Basic Information     Date Of Birth Sex Race Ethnicity Preferred Language    1945 Female White Non- English      Your appointments     Mar 27, 2017 10:40 AM   Diabetes Care Visit with FREDA DIABETES RN   [address] [address] [phone]           You will be receiving a confirmation call a few days before your appointment from our automated call confirmation system.            Mar 27, 2017 11:10 AM   Diabetes Care Visit with Cailin Livingston M.D.   [address] [address] [phone]           You will be receiving a confirmation call a few days before your appointment from our automated call confirmation system.              "   Problem List              ICD-10-CM Priority Class Noted - Resolved    Preventative health care Z00.00   6/11/2009 - Present    DM (diabetes mellitus), type 2, uncontrolled (CMS-HCC) E11.65   6/11/2009 - Present    Hyperlipidemia with target LDL less than 100 E78.5   6/11/2009 - Present    COPD (chronic obstructive pulmonary disease) (CMS-HCC) J44.9   6/11/2009 - Present    Major depressive disorder F32.9   6/11/2009 - Present    History of Gout when on HCTZ Z87.39   6/11/2009 - Present    Glaucoma suspect H40.009   6/11/2009 - Present    Hyperplastic colon polyp K63.5   12/7/2007 - Present    Cervical High Risk HPV Test Positive R87.810   10/18/2006 - Present    HTN (hypertension) I10   12/18/2009 - Present    Vitamin D insufficiency E55.9   4/2/2010 - Present    Seasonal allergic rhinitis J30.2   4/19/2010 - Present    Lichen sclerosus et atrophicus of the vulva N90.4   4/20/2011 - Present    Hypertriglyceridemia E78.1   3/30/2012 - Present    HDL deficiency E78.6   3/30/2012 - Present    DDD (degenerative disc disease), lumbar M51.36   8/14/2013 - Present    Abnormal CXR R93.8   11/22/2013 - Present    Abnormal cardiovascular stress test R94.39   8/6/2014 - Present    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   7/6/2015 - Present    Posterior vitreous detachment of right eye H43.811   7/6/2015 - Present    DM type 2 with diabetic dyslipidemia (CMS-HCC) E11.69, E78.5   2/23/2016 - Present    Elevated TSH R94.6   7/1/2016 - Present      Health Maintenance        Date Due Completion Dates    A1C SCREENING 12/27/2016 6/27/2016, 5/3/2016, 2/16/2016, 6/18/2015, 2/27/2015, 2/19/2015, 10/20/2014, 6/23/2014, 3/21/2014, 8/7/2013, 5/6/2013, 10/30/2012, 7/11/2011, 12/27/2010, 6/23/2010, 3/23/2010, 12/21/2009, 6/9/2009, 12/9/2008    RETINAL SCREENING 2/3/2017 2/3/2016, 3/11/2014    FASTING LIPID PROFILE 2/16/2017 2/16/2016, 6/18/2015, 10/20/2014, 3/21/2014, 11/14/2013, 10/30/2012, 3/26/2012, 7/11/2011, 6/23/2010, 12/21/2009,  6/9/2009, 12/9/2008    URINE ACR / MICROALBUMIN 2/16/2017 2/16/2016, 2/19/2015, 3/21/2014, 8/7/2013, 5/6/2013, 3/26/2012, 7/11/2011, 6/23/2010, 12/21/2009, 6/9/2009    SERUM CREATININE 2/16/2017 2/16/2016, 2/16/2016, 6/18/2015, 2/19/2015, 10/20/2014, 3/21/2014, 11/25/2013, 11/15/2013, 5/6/2013, 10/30/2012, 3/26/2012, 7/11/2011, 12/27/2010, 3/23/2010, 6/9/2009, 12/9/2008    DIABETES MONOFILAMENT / LE EXAM 2/23/2017 2/23/2016, 6/26/2015, 3/31/2014, 3/31/2014 (Done), 5/14/2013 (Done), 11/12/2012 (Done), 3/30/2012 (Done)    Override on 3/31/2014: Done    Override on 5/14/2013: Done    Override on 11/12/2012: Done    Override on 3/30/2012: Done    COLONOSCOPY 12/7/2017 12/7/2007 (Done)    Override on 12/7/2007: Done    MAMMOGRAM 5/12/2018 5/12/2016, 7/12/2013, 8/9/2011, 7/13/2010, 7/13/2010, 7/9/2009, 7/9/2009    BONE DENSITY 7/12/2018 7/12/2013, 7/9/2009    IMM DTaP/Tdap/Td Vaccine (2 - Td) 2/27/2025 2/27/2015            Current Immunizations     13-VALENT PCV PREVNAR 5/9/2016    Influenza TIV (IM) 10/22/2013, 11/12/2012, 1/5/2011    Influenza Vaccine Adult HD 11/29/2016, 10/22/2015    Influenza Vaccine Quad Inj (Pf) 10/13/2014    Pneumococcal polysaccharide vaccine (PPSV-23) 11/12/2012    SHINGLES VACCINE 7/2/2010    Tdap Vaccine 2/27/2015      Below and/or attached are the medications your provider expects you to take. Review all of your home medications and newly ordered medications with your provider and/or pharmacist. Follow medication instructions as directed by your provider and/or pharmacist. Please keep your medication list with you and share with your provider. Update the information when medications are discontinued, doses are changed, or new medications (including over-the-counter products) are added; and carry medication information at all times in the event of emergency situations     Allergies:  PCN - Rash     SULFA DRUGS - Rash     TOPROL XL - (reactions not documented)               Medications  Valid  as of: February 28, 2017 - 12:28 PM    Generic Name Brand Name Tablet Size Instructions for use    Albuterol Sulfate (Aero Soln) albuterol 108 (90 BASE) MCG/ACT Inhale 1-2 Puffs by mouth every 6 hours as needed for Shortness of Breath.        AmLODIPine Besylate (Tab) NORVASC 10 MG Take 1 Tab by mouth every day.        Aspirin (Tablet Delayed Response) aspirin 81 MG Take 1 Tab by mouth every day.        Cetirizine HCl (Tab) ZYRTEC 10 MG Take 10 mg by mouth every day.          Cholecalciferol (Tab) cholecalciferol 1000 UNIT Take 1,000 Units by mouth every day.        Clobetasol Propionate (Ointment) TEMOVATE 0.05 % Apply  to affected area(s). Apply twice daily to affected area for 1 week then once daily for 1 week then as needed for rash        Cyclobenzaprine HCl (Tab) FLEXERIL 10 MG TAKE 1/2 TO 1 TABLET AT BEDTIME AS NEEDED FOR PAIN/SPASMS.         DiltiaZEM HCl Coated Beads (CAPSULE SR 24 HR) CARDIZEM  MG Take 1 Cap by mouth every day.        Fluconazole (Tab) DIFLUCAN 150 MG Take one tab on day one and one tab on day three        Fluticasone Propionate (Suspension) FLONASE 50 MCG/ACT USE ONE SPRAY IN EACH NOSTRIL DAILY        GlipiZIDE (Tab) GLUCOTROL 10 MG Take 1 Tab by mouth 2 times a day.        Glucose Blood (Strip) glucose blood  USE TO TEST BLOOD SUGAR DAILY        Ibuprofen (Cap) Ibuprofen 200 MG Take  by mouth.        Lancets (Misc) [employer] FINEPOINT LANCETS  USE TO TEST BLOOD SUGAR DAILY        Lisinopril (Tab) PRINIVIL, ZESTRIL 40 MG Take 1 Tab by mouth every day.        MetFORMIN HCl (TABLET SR 24 HR) GLUCOPHAGE  MG TAKE FOUR TABLETS (2000MG)  BY MOUTH DAILY        Mometasone Furo-Formoterol Fum (Aerosol) Mometasone Furo-Formoterol Fum 100-5 MCG/ACT Inhale 1 Puff by mouth 2 Times a Day.        NON SPECIFIED   by Other route. 1. Lancettes. Use daily #100 RF x 1 year  2. Glucometer test strips. Use daily #100 RF x 1 year.  Dx: 250.00        Polyethyl Glycol-Propyl Glycol   by Ophthalmic  route.        RaNITidine HCl (Tab) ZANTAC 150 MG Take 150 mg by mouth 2 times a day.        Rosuvastatin Calcium (Tab) CRESTOR 10 MG TAKE 1 TABLET BY MOUTH DAILY.        Sertraline HCl (Tab) ZOLOFT 100 MG TAKE ONE TABLET BY MOUTH DAILY        SITagliptin Phosphate (Tab) JANUVIA 100 MG TAKE ONE TABLET BY MOUTH DAILY        .                 Medicines prescribed today were sent to:     [id number] - [place], [place] - [address]  [facility] [address]    [address]  [facility] [address] [facility] [place] [address]    Phone: [phone] Fax: [phone]    Open 24 Hours?: No    [facility] - [place], [place] - [address]    [address] [place] [address]    Phone: [phone] Fax: [phone]    Open 24 Hours?: No      Medication refill instructions:       If your prescription bottle indicates you have medication refills left, it is not necessary to call your provider’s office. Please contact your pharmacy and they will refill your medication.    If your prescription bottle indicates you do not have any refills left, you may request refills at any time through one of the following ways: The online [employer] system (except Urgent Care), by calling your provider’s office, or by asking your pharmacy to contact your provider’s office with a refill request. Medication refills are processed only during regular business hours and may not be available until the next business day. Your provider may request additional information or to have a follow-up visit with you prior to refilling your medication.   *Please Note: Medication refills are assigned a new Rx number when refilled electronically. Your pharmacy may indicate that no refills were authorized even though a new prescription for the same medication is available at the pharmacy. Please request the medicine by name with the pharmacy before contacting your provider for a refill.        Your To Do List     Future Labs/Procedures Complete By Expires    CBC WITH DIFFERENTIAL  As  directed 3/1/2018    COMP METABOLIC PANEL  As directed 3/1/2018    LIPID PROFILE  As directed 3/1/2018    MICROALBUMIN CREAT RATIO URINE  As directed 3/1/2018    TSH WITH REFLEX TO FT4  As directed 2/28/2018    VAGINAL PATHOGENS DNA PANEL  As directed 3/1/2018    VITAMIN D,25 HYDROXY  As directed 3/1/2018      Other Notes About Your Plan                    MyChart Access Code: Activation code not generated  Current MyChart Status: Active

## 2017-02-28 NOTE — Clinical Note
[facility]  Cailin Livingston M.D.  [address]  [place] [address]  Fax: [phone]   Authorization for Release/Disclosure of   Protected Health Information   Name: KENIA FIORE : 1945 SSN: [id number]   Address: [place] [address] Phone:    [phone] (home)    I authorize the entity listed below to release/disclose the PHI below to:   [facility]/Cailin Livingston M.D. and Karine Hollis PA-C   Provider or Entity Name:  Dr. Italia Oliveros O.D.     Address   City, [facility]   Phone:      Fax:     Reason for request: continuity of care   Information to be released:    [  ] LAST COLONOSCOPY,  including any PATH REPORT and follow-up  [  ] LAST FIT/COLOGUARD RESULT [  ] LAST DEXA  [  ] LAST MAMMOGRAM  [  ] LAST PAP  [  ] LAST LABS [ X ] RETINA EXAM REPORT  [  ] IMMUNIZATION RECORDS  [  ] Release all info      [  ] Check here and initial the line next to each item to release ALL health information INCLUDING  _____ Care and treatment for drug and / or alcohol abuse  _____ HIV testing, infection status, or AIDS  _____ Genetic Testing    DATES OF SERVICE OR TIME PERIOD TO BE DISCLOSED: _____________  I understand and acknowledge that:  * This Authorization may be revoked at any time by you in writing, except if your health information has already been used or disclosed.  * Your health information that will be used or disclosed as a result of you signing this authorization could be re-disclosed by the recipient. If this occurs, your re-disclosed health information may no longer be protected by State or Federal laws.  * You may refuse to sign this Authorization. Your refusal will not affect your ability to obtain treatment.  * This Authorization becomes effective upon signing and will  on (date) __________.      If no date is indicated, this Authorization will  one (1) year from the signature date.    Name: Kenia Fiore    Signature:   Date:     2017       PLEASE FAX REQUESTED RECORDS BACK TO: [phone]

## 2017-03-01 LAB
CANDIDA DNA VAG QL PROBE+SIG AMP: NEGATIVE
G VAGINALIS DNA VAG QL PROBE+SIG AMP: NEGATIVE
T VAGINALIS DNA VAG QL PROBE+SIG AMP: NEGATIVE

## 2017-03-14 RX ORDER — DILTIAZEM HYDROCHLORIDE 300 MG/1
CAPSULE, EXTENDED RELEASE ORAL
Qty: 90 CAP | Refills: 0 | Status: SHIPPED | OUTPATIENT
Start: 2017-03-14 | End: 2017-06-21 | Stop reason: SDUPTHER

## 2017-03-21 ENCOUNTER — HOSPITAL ENCOUNTER (OUTPATIENT)
Dept: LAB | Facility: MEDICAL CENTER | Age: 72
End: 2017-03-21
Attending: PHYSICIAN ASSISTANT
Payer: MEDICARE

## 2017-03-21 DIAGNOSIS — J44.9 CHRONIC OBSTRUCTIVE PULMONARY DISEASE, UNSPECIFIED COPD TYPE (HCC): ICD-10-CM

## 2017-03-21 DIAGNOSIS — E78.5 DM TYPE 2 WITH DIABETIC DYSLIPIDEMIA (HCC): ICD-10-CM

## 2017-03-21 DIAGNOSIS — E55.9 VITAMIN D INSUFFICIENCY: ICD-10-CM

## 2017-03-21 DIAGNOSIS — R79.89 ELEVATED TSH: ICD-10-CM

## 2017-03-21 DIAGNOSIS — E11.69 DM TYPE 2 WITH DIABETIC DYSLIPIDEMIA (HCC): ICD-10-CM

## 2017-03-21 LAB
25(OH)D3 SERPL-MCNC: 27 NG/ML (ref 30–100)
ALBUMIN SERPL BCP-MCNC: 4.5 G/DL (ref 3.2–4.9)
ALBUMIN/GLOB SERPL: 1.6 G/DL
ALP SERPL-CCNC: 94 U/L (ref 30–99)
ALT SERPL-CCNC: 39 U/L (ref 2–50)
ANION GAP SERPL CALC-SCNC: 5 MMOL/L (ref 0–11.9)
AST SERPL-CCNC: 31 U/L (ref 12–45)
BASOPHILS # BLD AUTO: 0.15 K/UL (ref 0–0.12)
BASOPHILS NFR BLD AUTO: 1.7 % (ref 0–1.8)
BILIRUB SERPL-MCNC: 0.4 MG/DL (ref 0.1–1.5)
BUN SERPL-MCNC: 8 MG/DL (ref 8–22)
CALCIUM SERPL-MCNC: 9.6 MG/DL (ref 8.5–10.5)
CHLORIDE SERPL-SCNC: 102 MMOL/L (ref 96–112)
CHOLEST SERPL-MCNC: 121 MG/DL (ref 100–199)
CO2 SERPL-SCNC: 27 MMOL/L (ref 20–33)
CREAT SERPL-MCNC: 0.78 MG/DL (ref 0.5–1.4)
EOSINOPHIL # BLD: 0.54 K/UL (ref 0–0.51)
EOSINOPHIL NFR BLD AUTO: 6 % (ref 0–6.9)
ERYTHROCYTE [DISTWIDTH] IN BLOOD BY AUTOMATED COUNT: 40.3 FL (ref 35.9–50)
GLOBULIN SER CALC-MCNC: 2.9 G/DL (ref 1.9–3.5)
GLUCOSE SERPL-MCNC: 200 MG/DL (ref 65–99)
HCT VFR BLD AUTO: 43 % (ref 37–47)
HDLC SERPL-MCNC: 48 MG/DL
HGB BLD-MCNC: 14.3 G/DL (ref 12–16)
IMM GRANULOCYTES # BLD AUTO: 0.07 K/UL (ref 0–0.11)
IMM GRANULOCYTES NFR BLD AUTO: 0.8 % (ref 0–0.9)
LDLC SERPL CALC-MCNC: 48 MG/DL
LYMPHOCYTES # BLD: 1.91 K/UL (ref 1–4.8)
LYMPHOCYTES NFR BLD AUTO: 21.2 % (ref 22–41)
MCH RBC QN AUTO: 28.7 PG (ref 27–33)
MCHC RBC AUTO-ENTMCNC: 33.3 G/DL (ref 33.6–35)
MCV RBC AUTO: 86.2 FL (ref 81.4–97.8)
MONOCYTES # BLD: 0.81 K/UL (ref 0–0.85)
MONOCYTES NFR BLD AUTO: 9 % (ref 0–13.4)
NEUTROPHILS # BLD: 5.53 K/UL (ref 2–7.15)
NEUTROPHILS NFR BLD AUTO: 61.3 % (ref 44–72)
NRBC # BLD AUTO: 0 K/UL
NRBC BLD-RTO: 0 /100 WBC
PLATELET # BLD AUTO: 195 K/UL (ref 164–446)
PMV BLD AUTO: 12.5 FL (ref 9–12.9)
POTASSIUM SERPL-SCNC: 3.9 MMOL/L (ref 3.6–5.5)
PROT SERPL-MCNC: 7.4 G/DL (ref 6–8.2)
RBC # BLD AUTO: 4.99 M/UL (ref 4.2–5.4)
SODIUM SERPL-SCNC: 134 MMOL/L (ref 135–145)
T4 FREE SERPL-MCNC: 0.74 NG/DL (ref 0.53–1.43)
TRIGL SERPL-MCNC: 125 MG/DL (ref 0–149)
TSH SERPL DL<=0.005 MIU/L-ACNC: 5.29 UIU/ML (ref 0.3–3.7)
WBC # BLD AUTO: 9 K/UL (ref 4.8–10.8)

## 2017-03-21 PROCEDURE — 84439 ASSAY OF FREE THYROXINE: CPT

## 2017-03-21 PROCEDURE — 80061 LIPID PANEL: CPT

## 2017-03-21 PROCEDURE — 36415 COLL VENOUS BLD VENIPUNCTURE: CPT

## 2017-03-21 PROCEDURE — 84443 ASSAY THYROID STIM HORMONE: CPT

## 2017-03-21 PROCEDURE — 85025 COMPLETE CBC W/AUTO DIFF WBC: CPT

## 2017-03-21 PROCEDURE — 80053 COMPREHEN METABOLIC PANEL: CPT

## 2017-03-21 PROCEDURE — 82306 VITAMIN D 25 HYDROXY: CPT

## 2017-03-27 ENCOUNTER — OFFICE VISIT (OUTPATIENT)
Dept: MEDICAL GROUP | Age: 72
End: 2017-03-27
Payer: MEDICARE

## 2017-03-27 ENCOUNTER — APPOINTMENT (OUTPATIENT)
Dept: MEDICAL GROUP | Age: 72
End: 2017-03-27
Payer: MEDICARE

## 2017-03-27 VITALS
WEIGHT: 129.2 LBS | HEART RATE: 81 BPM | HEIGHT: 61 IN | BODY MASS INDEX: 24.39 KG/M2 | TEMPERATURE: 98.6 F | SYSTOLIC BLOOD PRESSURE: 124 MMHG | DIASTOLIC BLOOD PRESSURE: 62 MMHG | OXYGEN SATURATION: 96 %

## 2017-03-27 DIAGNOSIS — E78.5 HYPERLIPIDEMIA WITH TARGET LDL LESS THAN 100: ICD-10-CM

## 2017-03-27 DIAGNOSIS — H40.009 GLAUCOMA SUSPECT, UNSPECIFIED LATERALITY: ICD-10-CM

## 2017-03-27 DIAGNOSIS — R87.810 CERVICAL HIGH RISK HPV (HUMAN PAPILLOMAVIRUS) TEST POSITIVE: ICD-10-CM

## 2017-03-27 DIAGNOSIS — J44.9 CHRONIC OBSTRUCTIVE PULMONARY DISEASE, UNSPECIFIED COPD TYPE (HCC): ICD-10-CM

## 2017-03-27 DIAGNOSIS — E11.69 DM TYPE 2 WITH DIABETIC DYSLIPIDEMIA (HCC): ICD-10-CM

## 2017-03-27 DIAGNOSIS — R79.89 ELEVATED TSH: ICD-10-CM

## 2017-03-27 DIAGNOSIS — E78.5 DM TYPE 2 WITH DIABETIC DYSLIPIDEMIA (HCC): ICD-10-CM

## 2017-03-27 DIAGNOSIS — I10 ESSENTIAL HYPERTENSION: ICD-10-CM

## 2017-03-27 PROCEDURE — 3288F FALL RISK ASSESSMENT DOCD: CPT | Performed by: FAMILY MEDICINE

## 2017-03-27 PROCEDURE — 0518F FALL PLAN OF CARE DOCD: CPT | Mod: 8P | Performed by: FAMILY MEDICINE

## 2017-03-27 PROCEDURE — 3046F HEMOGLOBIN A1C LEVEL >9.0%: CPT | Performed by: FAMILY MEDICINE

## 2017-03-27 PROCEDURE — 1036F TOBACCO NON-USER: CPT | Performed by: FAMILY MEDICINE

## 2017-03-27 PROCEDURE — 3014F SCREEN MAMMO DOC REV: CPT | Performed by: FAMILY MEDICINE

## 2017-03-27 PROCEDURE — G8420 CALC BMI NORM PARAMETERS: HCPCS | Performed by: FAMILY MEDICINE

## 2017-03-27 PROCEDURE — 4040F PNEUMOC VAC/ADMIN/RCVD: CPT | Performed by: FAMILY MEDICINE

## 2017-03-27 PROCEDURE — 3017F COLORECTAL CA SCREEN DOC REV: CPT | Mod: 8P | Performed by: FAMILY MEDICINE

## 2017-03-27 PROCEDURE — G8432 DEP SCR NOT DOC, RNG: HCPCS | Performed by: FAMILY MEDICINE

## 2017-03-27 PROCEDURE — G8482 FLU IMMUNIZE ORDER/ADMIN: HCPCS | Performed by: FAMILY MEDICINE

## 2017-03-27 PROCEDURE — 1100F PTFALLS ASSESS-DOCD GE2>/YR: CPT | Performed by: FAMILY MEDICINE

## 2017-03-27 PROCEDURE — 99214 OFFICE O/P EST MOD 30 MIN: CPT | Performed by: FAMILY MEDICINE

## 2017-03-27 RX ORDER — ALBUTEROL SULFATE 90 UG/1
1-2 AEROSOL, METERED RESPIRATORY (INHALATION) EVERY 6 HOURS PRN
Qty: 1 INHALER | Refills: 3 | Status: SHIPPED | OUTPATIENT
Start: 2017-03-27 | End: 2019-01-16 | Stop reason: SDUPTHER

## 2017-03-27 NOTE — PATIENT INSTRUCTIONS
Start with 10 units of Lantus in the evening.  Your goal for your fasting blood sugars is 100-150.  Every 4-5 days you can increase the dose of Lantus by 1 unit until your fasting blood sugars are in target range.   If you start experiencing low blood sugars in the middle of the night, decrease your Lantus by 1 unit.

## 2017-03-27 NOTE — PROGRESS NOTES
RN-CDE Note    Subjective:     Health changes since last visit/interval Hx: states she was ill with cold symptoms all of January    Medications (including changes made today)  Current Outpatient Prescriptions   Medication Sig Dispense Refill   • CARTIA  MG CAPSULE SR 24 HR TAKE ONE CAPSULE BY MOUTH DAILY 90 Cap 0   • Polyethyl Glycol-Propyl Glycol (SYSTANE FREE OP) by Ophthalmic route.     • glipiZIDE (GLUCOTROL) 10 MG Tab Take 1 Tab by mouth 2 times a day. 180 Tab 2   • amlodipine (NORVASC) 10 MG Tab Take 1 Tab by mouth every day. 30 Tab 11   • JANUVIA 100 MG Tab TAKE ONE TABLET BY MOUTH DAILY 90 Tab 1   • sertraline (ZOLOFT) 100 MG Tab TAKE ONE TABLET BY MOUTH DAILY 90 Tab 1   • lisinopril (PRINIVIL, ZESTRIL) 40 MG tablet Take 1 Tab by mouth every day. 90 Tab 2   • glucose blood (ONE TOUCH ULTRA TEST) strip USE TO TEST BLOOD SUGAR DAILY 100 Strip 11   • CRESTOR 10 MG Tab TAKE 1 TABLET BY MOUTH DAILY. 30 Tab 5   • Mometasone Furo-Formoterol Fum (DULERA) 100-5 MCG/ACT Aerosol Inhale 1 Puff by mouth 2 Times a Day. 3 Inhaler 3   • [place] [employer] FINEPOINT LANCETS MISC USE TO TEST BLOOD SUGAR DAILY 100 Each 11   • vitamin D (CHOLECALCIFEROL) 1000 UNIT TABS Take 1,000 Units by mouth every day.     • ASPIRIN 81 MG PO TBEC Take 1 Tab by mouth every day. 30 Each 11   • metformin ER (GLUCOPHAGE XR) 500 MG TABLET SR 24 HR TAKE FOUR TABLETS (2000MG)  BY MOUTH DAILY 360 Tab 2   • ranitidine (ZANTAC) 150 MG Tab Take 150 mg by mouth 2 times a day.     • cyclobenzaprine (FLEXERIL) 10 MG Tab TAKE 1/2 TO 1 TABLET AT BEDTIME AS NEEDED FOR PAIN/SPASMS.  30 Tab 5   • albuterol (VENTOLIN OR PROVENTIL) 108 (90 BASE) MCG/ACT Aero Soln inhalation aerosol Inhale 1-2 Puffs by mouth every 6 hours as needed for Shortness of Breath. 1 Inhaler 3   • Ibuprofen (ADVIL) 200 MG CAPS Take  by mouth.     • fluticasone (FLONASE) 50 MCG/ACT nasal spray USE ONE SPRAY IN EACH NOSTRIL DAILY 1 Bottle 5   • clobetasol (TEMOVATE) 0.05 % OINT Apply  to  "affected area(s). Apply twice daily to affected area for 1 week then once daily for 1 week then as needed for rash 30 g 0   • NON SPECIFIED by Other route. 1. Lancettes. Use daily #100 RF x 1 year  2. Glucometer test strips. Use daily #100 RF x 1 year.  Dx: 250.00 100 Each 1   • cetirizine (ZYRTEC) 10 MG TABS Take 10 mg by mouth every day.         No current facility-administered medications for this visit.       Taking daily ASA: Yes  Taking above medications as prescribed: Yes  Patient Denies side effects of medication.    Exercise: sporadic exercise  Diet: \"healthy\" diet  in general  meals per day on average: 2    Health Maintenance:       Immunizations:   PPSV23: up to date  [id number]: up to date  Tdap: up to date  Flu: up to date      DM:   Last A1c:   Lab Results   Component Value Date/Time    GLYCOHEMOGLOBIN 12.4 02/28/2017 03:43 PM      A1c goal: < 7 to 7.5    Glucose monitoring frequency: daily (fasting)  fasting range: 200+    Hypoglycemic episodes: no     Last Retinal Exam: current as of October  Daily Foot Exam: yes  Routine Dental Exams: yes    Lab Results   Component Value Date/Time    MICROALBUMIN-CREATININE <5.2 06/23/2010 09:17 AM    MICRO ALB CREAT RATIO see below 02/28/2017 12:30 PM    MICROALBUMIN, URINE RANDOM <0.7 02/28/2017 12:30 PM    MICROALBUMIN, URINE RANDOM <3.0 06/23/2010 09:17 AM        ACR Albumin/Creatinine Ratio goal <30 at goal    Diabetic complications: none    HTN:   Blood pressure goal <140/<80 at goal.   Currently Rx ACE/ARB: Yes    Dyslipidemia:    Lab Results   Component Value Date/Time    CHOLESTEROL, 03/21/2017 12:09 PM    LDL 48 03/21/2017 12:09 PM    HDL 48 03/21/2017 12:09 PM    TRIGLYCERIDES 125 03/21/2017 12:09 PM       Lab Results   Component Value Date/Time    SODIUM 134* 03/21/2017 12:09 PM    POTASSIUM 3.9 03/21/2017 12:09 PM    CHLORIDE 102 03/21/2017 12:09 PM    CO2 27 03/21/2017 12:09 PM    GLUCOSE 200* 03/21/2017 12:09 PM    BUN 8 03/21/2017 12:09 PM    " CREATININE 0.78 03/21/2017 12:09 PM    BUN-CREATININE RATIO 14 12/27/2010 10:12 AM    GLOM FILT RATE, EST 52* 12/27/2010 10:12 AM     Lab Results   Component Value Date/Time    ALKALINE PHOSPHATASE 94 03/21/2017 12:09 PM    AST(SGOT) 31 03/21/2017 12:09 PM    ALT(SGPT) 39 03/21/2017 12:09 PM    TOTAL BILIRUBIN 0.4 03/21/2017 12:09 PM        Currently Rx Statin: Yes      She  reports that she quit smoking about 22 years ago. Her smoking use included Cigarettes. She started smoking about 51 years ago. She has a 29 pack-year smoking history. She has quit using smokeless tobacco.    Objective:     Exam:  Monofilament: not done      Plan:     Discussed All medications, side effects and compliance (discussed carefully)  Diabetic diet discussed in detail, written exchange diet given  Foot care discussed and Podiatry visits  Home glucose monitoring emphasized  Referral to Diabetic Education Department.         Patient educated on:  - HbA1C: target and what A1C is  - Insulin: using an Insulin Pen, rotation of sites, storage of Insulin and disposal of sharps    Recommended medication changes: Start Lantus 10 units at hs.  She will continue to check blood sugars at least one time per day fasting.  She was instructed that the goal for her fasting blood sugars is 100-150 75% of the time.  If she experiences hypoglycemia in the middle of the night she will decrease her Lantus to 9 units otherwise she will increase by 1 units every 4-5 days as needed until fasting blood sugars are in range.

## 2017-03-30 NOTE — PROGRESS NOTES
Subjective:   HPI  Chief Complaint   Patient presents with   • Diabetes Mellitus   • Hypertension   • Hyperlipidemia       Kenia Oconnell is a 71 y.o. female here for follow up of chronic conditions of diabetes mellitus, hypertension, and hyperlipidemia.     DM-RN note reviewed including HPI for DM, HTN, Hyperlipidemia.       Exercise frequency and limitations reviewed.  Feet symptoms reviewed.  Nutritional status reviewed.  Retinal eye exam history reviewed.    Patient additionally reports she has been eating more, exercising less, not monitoring sugars. She is surprised that A1c is somewhat higher than prior reading.  She is feeling well and denies chest pain, palpitations, dyspnea, orthopnea, PND or peripheral edema. Also denies polyuria, polydipsia, urinary complaints, abdominal complaints, myalgias, numbness, weakness or other symptoms related to diabetes.  HTN - taking med daily as directed. BP is at goal.   Hyperlipidemia - taking med daily as directed. Lipid lab is at goal.   Elevated TSH. Denies temperature intolerance or unusual fatigue. Not taking any medications currently.  Vitamin D deficiency. Take supplements on occasion. Not consistent. No intolerance to supplement.  Glaucoma suspect. She is current with ophthalmology exam. Denies any recent vision changes or concerns  She previously had a Pap which showed high risk HPV. She is requesting referral to gynecology for further evaluation. Does have some vulvar atrophy symptoms.  COPD. No current smoking. Does not use oxygen chronically. Denies any recent exacerbation: no new or changing cough.. Is using medications as directed.    Patient Active Problem List    Diagnosis Date Noted   • Elevated TSH 07/01/2016   • DM type 2 with diabetic dyslipidemia (CMS-[facility]) 02/23/2016   • Bilateral ocular hypertension 07/06/2015   • Posterior vitreous detachment of right eye 07/06/2015   • Abnormal cardiovascular stress test 08/06/2014   • Abnormal CXR  11/22/2013   • DDD (degenerative disc disease), lumbar 08/14/2013   • Hypertriglyceridemia 03/30/2012   • HDL deficiency 03/30/2012   • Lichen sclerosus et atrophicus of the vulva 04/20/2011   • Seasonal allergic rhinitis 04/19/2010   • Vitamin D insufficiency 04/02/2010   • HTN (hypertension) 12/18/2009   • Preventative health care 06/11/2009   • DM (diabetes mellitus), type 2, uncontrolled (CMS-HCC) 06/11/2009   • Hyperlipidemia with target LDL less than 100 06/11/2009   • COPD (chronic obstructive pulmonary disease) (CMS-HCC) 06/11/2009   • Major depressive disorder 06/11/2009   • History of Gout when on HCTZ 06/11/2009   • Glaucoma suspect 06/11/2009   • Hyperplastic colon polyp 12/07/2007   • Cervical High Risk HPV Test Positive 10/18/2006           Current medications including changes today:  Current Outpatient Prescriptions   Medication Sig Dispense Refill   • insulin glargine (LANTUS SOLOSTAR) 100 UNIT/ML Solution Pen-injector injection Inject 15 Units as instructed every evening. 15 mL 2   • Insulin Pen Needle 32G X 4 MM Misc Using one per day with Lantus injection 1 Each 2   • albuterol 108 (90 BASE) MCG/ACT Aero Soln inhalation aerosol Inhale 1-2 Puffs by mouth every 6 hours as needed for Shortness of Breath. 1 Inhaler 3   • CARTIA  MG CAPSULE SR 24 HR TAKE ONE CAPSULE BY MOUTH DAILY 90 Cap 0   • Polyethyl Glycol-Propyl Glycol (SYSTANE FREE OP) by Ophthalmic route.     • glipiZIDE (GLUCOTROL) 10 MG Tab Take 1 Tab by mouth 2 times a day. 180 Tab 2   • amlodipine (NORVASC) 10 MG Tab Take 1 Tab by mouth every day. 30 Tab 11   • JANUVIA 100 MG Tab TAKE ONE TABLET BY MOUTH DAILY 90 Tab 1   • sertraline (ZOLOFT) 100 MG Tab TAKE ONE TABLET BY MOUTH DAILY 90 Tab 1   • metformin ER (GLUCOPHAGE XR) 500 MG TABLET SR 24 HR TAKE FOUR TABLETS (2000MG)  BY MOUTH DAILY 360 Tab 2   • lisinopril (PRINIVIL, ZESTRIL) 40 MG tablet Take 1 Tab by mouth every day. 90 Tab 2   • glucose blood (ONE TOUCH ULTRA TEST) strip  "USE TO TEST BLOOD SUGAR DAILY 100 Strip 11   • CRESTOR 10 MG Tab TAKE 1 TABLET BY MOUTH DAILY. 30 Tab 5   • Mometasone Furo-Formoterol Fum (DULERA) 100-5 MCG/ACT Aerosol Inhale 1 Puff by mouth 2 Times a Day. 3 Inhaler 3   • LIFESCAN FINEPOINT LANCETS MISC USE TO TEST BLOOD SUGAR DAILY 100 Each 11   • cetirizine (ZYRTEC) 10 MG TABS Take 10 mg by mouth every day.       • vitamin D (CHOLECALCIFEROL) 1000 UNIT TABS Take 1,000 Units by mouth every day.     • ASPIRIN 81 MG PO TBEC Take 1 Tab by mouth every day. 30 Each 11   • fluticasone (FLONASE) 50 MCG/ACT nasal spray USE ONE SPRAY IN EACH NOSTRIL DAILY 1 Bottle 5   • NON SPECIFIED by Other route. 1. Lancettes. Use daily #100 RF x 1 year  2. Glucometer test strips. Use daily #100 RF x 1 year.  Dx: 250.00 100 Each 1     No current facility-administered medications for this visit.         ROS  Pertinent  ROS findings as above.   All other systems reviewed and are negative except as per HPI.      Objective:     /62 mmHg  Pulse 81  Temp(Src) 37 °C (98.6 °F)  Ht 1.549 m (5' 1\")  Wt 58.605 kg (129 lb 3.2 oz)  BMI 24.42 kg/m2  SpO2 96%  LMP 01/01/2000 Body mass index is 24.42 kg/(m^2).     Alert, oriented in no acute distress.  Eye contact is good, speech goal directed, affect calm.  HEENT: conjunctiva non-injected, sclera non-icteric.  Nares patent with no significant congestion or drainage.  Rika pinnae, external auditory canals, TM pearly gray with normal light reflex bilaterally.  Oral mucous membranes pink and moist with no lesions or thrush.  Neck supple with no cervical lymphadenopathy, JVD, palpable thyroid nodules or carotid bruits.  Lungs: clear to auscultation bilaterally with good excursion.  CV: regular rate and rhythm.  Abdomen soft, No CVAT  Lower extremities warm with no edema.  Feet are not examined by myself today    Lab Results   Component Value Date/Time    GLYCOHEMOGLOBIN 12.4 02/28/2017 03:43 PM          Assessment/Plan:      1. DM type 2 " with diabetic dyslipidemia (CMS-HCC)  insulin glargine (LANTUS SOLOSTAR) 100 UNIT/ML Solution Pen-injector injection    Insulin Pen Needle 32G X 4 MM Misc    HEMOGLOBIN A1C    Significant worsening of A1c. Monitor sugars, monitored diet, and take insulin as per diabetic nurse educator   2. Hyperlipidemia with target LDL less than 100      Continue Crestor and work on diet and exercise   3. Chronic obstructive pulmonary disease, unspecified COPD type (CMS-HCC)      Continue inhaler and abstinence from smoking   4. Glaucoma suspect, unspecified laterality      Continue monitoring and management per ophthalmology   5. Cervical High Risk HPV Test Positive      Referral to gynecology updated per patient request.   6. Essential hypertension      At goal. Continue current medicines.   7. Elevated TSH  TSH WITH REFLEX TO FT4    Continue to monitor. If develops significant symptoms, will start medication.       DM2 A1c is not at goal previously at goal.-RN-CDE notes reviewed and discussed.  -Discussed diet, exercise, disease management and weight loss goals.   -Education and advice provided today: See RN-CDE note.  Additional education and advise, refills and referrals per orders.    Followup:   Return in about 3 months (around 6/27/2017) for DM- RN appt. sooner should new symptoms or problems arise.    The total time spent seeing the patient in consultation, and formulating an action plan for this visit was 35 minutes. Greater than  50% of this time was spent counseling, discussing problems documented above, coordinating care and answering questions by the provider and registered nurse--certified diabetes educator.

## 2017-04-12 RX ORDER — ROSUVASTATIN CALCIUM 10 MG/1
TABLET, COATED ORAL
Qty: 90 TAB | Refills: 2 | Status: SHIPPED | OUTPATIENT
Start: 2017-04-12 | End: 2018-04-04

## 2017-04-14 RX ORDER — MOMETASONE FUROATE AND FORMOTEROL FUMARATE DIHYDRATE 100; 5 UG/1; UG/1
AEROSOL RESPIRATORY (INHALATION)
Qty: 1 INHALER | Refills: 2 | Status: SHIPPED | OUTPATIENT
Start: 2017-04-14 | End: 2017-09-14 | Stop reason: SDUPTHER

## 2017-04-24 RX ORDER — CYCLOBENZAPRINE HCL 10 MG
TABLET ORAL
Qty: 30 TAB | Refills: 0 | Status: SHIPPED | OUTPATIENT
Start: 2017-04-24 | End: 2017-12-05 | Stop reason: SDUPTHER

## 2017-04-24 NOTE — TELEPHONE ENCOUNTER
Refill done  Patient has appointment scheduled to occur [place] [facility] [place]chedule with another provider at this office or office of patient's choice. BAM

## 2017-04-25 NOTE — TELEPHONE ENCOUNTER
Phone Number Called: [phone] (home)     Message: Left message for the patient to call us back regarding the note below.    Left Message for patient to call back: yes

## 2017-04-26 NOTE — TELEPHONE ENCOUNTER
Phone Number Called: [phone] (home)     Message: informed patient of Dr. Livingston's message below.  Cancelled upcoming appt and rescheduled for establishing with Dr. Chandler.    Left Message for patient to call back: no

## 2017-06-06 ENCOUNTER — OFFICE VISIT (OUTPATIENT)
Dept: MEDICAL GROUP | Facility: LAB | Age: 72
End: 2017-06-06
Payer: MEDICARE

## 2017-06-06 VITALS
OXYGEN SATURATION: 95 % | TEMPERATURE: 99.7 F | WEIGHT: 132 LBS | BODY MASS INDEX: 24.92 KG/M2 | HEIGHT: 61 IN | SYSTOLIC BLOOD PRESSURE: 138 MMHG | HEART RATE: 70 BPM | RESPIRATION RATE: 16 BRPM | DIASTOLIC BLOOD PRESSURE: 68 MMHG

## 2017-06-06 DIAGNOSIS — I10 ESSENTIAL HYPERTENSION: ICD-10-CM

## 2017-06-06 DIAGNOSIS — R79.89 ELEVATED TSH: ICD-10-CM

## 2017-06-06 DIAGNOSIS — E55.9 VITAMIN D INSUFFICIENCY: ICD-10-CM

## 2017-06-06 DIAGNOSIS — R87.810 CERVICAL HIGH RISK HPV (HUMAN PAPILLOMAVIRUS) TEST POSITIVE: ICD-10-CM

## 2017-06-06 DIAGNOSIS — J44.9 CHRONIC OBSTRUCTIVE PULMONARY DISEASE, UNSPECIFIED COPD TYPE (HCC): ICD-10-CM

## 2017-06-06 LAB
HBA1C MFR BLD: 7.9 % (ref ?–5.8)
INT CON NEG: NEGATIVE
INT CON POS: POSITIVE

## 2017-06-06 PROCEDURE — G8420 CALC BMI NORM PARAMETERS: HCPCS | Performed by: FAMILY MEDICINE

## 2017-06-06 PROCEDURE — 83036 HEMOGLOBIN GLYCOSYLATED A1C: CPT | Performed by: FAMILY MEDICINE

## 2017-06-06 PROCEDURE — 1100F PTFALLS ASSESS-DOCD GE2>/YR: CPT | Performed by: FAMILY MEDICINE

## 2017-06-06 PROCEDURE — 1036F TOBACCO NON-USER: CPT | Performed by: FAMILY MEDICINE

## 2017-06-06 PROCEDURE — 99214 OFFICE O/P EST MOD 30 MIN: CPT | Performed by: FAMILY MEDICINE

## 2017-06-06 PROCEDURE — 3045F PR MOST RECENT HEMOGLOBIN A1C LEVEL 7.0-9.0%: CPT | Performed by: FAMILY MEDICINE

## 2017-06-06 PROCEDURE — G8432 DEP SCR NOT DOC, RNG: HCPCS | Performed by: FAMILY MEDICINE

## 2017-06-06 PROCEDURE — 3017F COLORECTAL CA SCREEN DOC REV: CPT | Mod: 8P | Performed by: FAMILY MEDICINE

## 2017-06-06 PROCEDURE — 3014F SCREEN MAMMO DOC REV: CPT | Performed by: FAMILY MEDICINE

## 2017-06-06 PROCEDURE — 3288F FALL RISK ASSESSMENT DOCD: CPT | Performed by: FAMILY MEDICINE

## 2017-06-06 PROCEDURE — 4040F PNEUMOC VAC/ADMIN/RCVD: CPT | Performed by: FAMILY MEDICINE

## 2017-06-06 PROCEDURE — 0518F FALL PLAN OF CARE DOCD: CPT | Mod: 8P | Performed by: FAMILY MEDICINE

## 2017-06-06 ASSESSMENT — PATIENT HEALTH QUESTIONNAIRE - PHQ9: CLINICAL INTERPRETATION OF PHQ2 SCORE: 0

## 2017-06-06 NOTE — MR AVS SNAPSHOT
"        Kenia Boucher Anish   2017 1:20 PM   Office Visit   MRN: [id number]    Department:  [facility]   Dept Phone:  [phone]    Description:  Female : 1945   Provider:  Hermila Cox M.D.           Reason for Visit     Establish Care amlodipine makes her leg swell      Allergies as of 2017     Allergen Noted Reactions    Pcn [Penicillins] 2009   Rash    Sulfa Drugs 2009   Rash    Toprol Xl [Metoprolol] 02/15/2017       Fatigue        You were diagnosed with     Cervical high risk HPV (human papillomavirus) test positive   [540146]       Essential hypertension   [9235239]       Vitamin D insufficiency   [925333]       Elevated TSH   [420296]         Vital Signs     Blood Pressure Pulse Temperature Respirations Height Weight    138/68 mmHg 70 37.6 °C (99.7 °F) 16 1.549 m (5' 1\") 59.875 kg (132 lb)    Body Mass Index Oxygen Saturation Last Menstrual Period Smoking Status          24.95 kg/m2 95% 2000 Former Smoker        Basic Information     Date Of Birth Sex Race Ethnicity Preferred Language    1945 Female White Non- English      Problem List              ICD-10-CM Priority Class Noted - Resolved    Preventative health care Z00.00   2009 - Present    DM (diabetes mellitus), type 2, uncontrolled (CMS-HCC) E11.65   2009 - Present    Hyperlipidemia with target LDL less than 100 E78.5   2009 - Present    COPD (chronic obstructive pulmonary disease) (CMS-HCC) J44.9   2009 - Present    Major depressive disorder (CMS-HCC) F32.9   2009 - Present    History of Gout when on HCTZ Z87.39   2009 - Present    Glaucoma suspect H40.009   2009 - Present    Hyperplastic colon polyp K63.5   2007 - Present    Cervical High Risk HPV Test Positive R87.810   10/18/2006 - Present    HTN (hypertension) I10   2009 - Present    Vitamin D insufficiency E55.9   2010 - Present    Seasonal allergic rhinitis J30.2   2010 - " Present    Lichen sclerosus et atrophicus of the vulva N90.4   4/20/2011 - Present    Hypertriglyceridemia E78.1   3/30/2012 - Present    HDL deficiency E78.6   3/30/2012 - Present    DDD (degenerative disc disease), lumbar M51.36   8/14/2013 - Present    Abnormal CXR R93.8   11/22/2013 - Present    Abnormal cardiovascular stress test R94.39   8/6/2014 - Present    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   7/6/2015 - Present    Posterior vitreous detachment of right eye H43.811   7/6/2015 - Present    Elevated TSH R94.6   7/1/2016 - Present      Health Maintenance        Date Due Completion Dates    A1C SCREENING 8/28/2017 2/28/2017, 6/27/2016, 5/3/2016, 2/16/2016, 6/18/2015, 2/27/2015, 2/19/2015, 10/20/2014, 6/23/2014, 3/21/2014, 8/7/2013, 5/6/2013, 10/30/2012, 7/11/2011, 12/27/2010, 6/23/2010, 3/23/2010, 12/21/2009, 6/9/2009, 12/9/2008    COLONOSCOPY 12/7/2017 12/7/2007 (Done)    Override on 12/7/2007: Done    URINE ACR / MICROALBUMIN 2/28/2018 2/28/2017, 2/16/2016, 2/19/2015, 3/21/2014, 8/7/2013, 5/6/2013, 3/26/2012, 7/11/2011, 6/23/2010, 12/21/2009, 6/9/2009    DIABETES MONOFILAMENT / LE EXAM 3/2/2018 3/2/2017, 2/23/2016, 6/26/2015, 3/31/2014, 3/31/2014 (Done), 5/14/2013 (Done), 11/12/2012 (Done), 3/30/2012 (Done)    Override on 3/31/2014: Done    Override on 5/14/2013: Done    Override on 11/12/2012: Done    Override on 3/30/2012: Done    FASTING LIPID PROFILE 3/21/2018 3/21/2017, 2/16/2016, 6/18/2015, 10/20/2014, 3/21/2014, 11/14/2013, 10/30/2012, 3/26/2012, 7/11/2011, 6/23/2010, 12/21/2009, 6/9/2009, 12/9/2008    SERUM CREATININE 3/21/2018 3/21/2017, 2/16/2016, 2/16/2016, 6/18/2015, 2/19/2015, 10/20/2014, 3/21/2014, 11/25/2013, 11/15/2013, 5/6/2013, 10/30/2012, 3/26/2012, 7/11/2011, 12/27/2010, 3/23/2010, 6/9/2009, 12/9/2008    RETINAL SCREENING 4/5/2018 4/5/2017, 10/5/2016, 2/3/2016, 3/11/2014    MAMMOGRAM 5/12/2018 5/12/2016, 7/12/2013, 8/9/2011, 7/13/2010, 7/13/2010, 7/9/2009, 7/9/2009    BONE DENSITY  7/12/2018 7/12/2013, 7/9/2009    IMM DTaP/Tdap/Td Vaccine (2 - Td) 2/27/2025 2/27/2015            Current Immunizations     13-VALENT PCV PREVNAR 5/9/2016    Influenza TIV (IM) 10/22/2013, 11/12/2012, 1/5/2011    Influenza Vaccine Adult HD 11/29/2016, 10/22/2015    Influenza Vaccine Quad Inj (Pf) 10/13/2014    Pneumococcal polysaccharide vaccine (PPSV-23) 11/12/2012    SHINGLES VACCINE 7/2/2010    Tdap Vaccine 2/27/2015      Below and/or attached are the medications your provider expects you to take. Review all of your home medications and newly ordered medications with your provider and/or pharmacist. Follow medication instructions as directed by your provider and/or pharmacist. Please keep your medication list with you and share with your provider. Update the information when medications are discontinued, doses are changed, or new medications (including over-the-counter products) are added; and carry medication information at all times in the event of emergency situations     Allergies:  PCN - Rash     SULFA DRUGS - Rash     TOPROL XL - (reactions not documented)               Medications  Valid as of: June 06, 2017 -  1:54 PM    Generic Name Brand Name Tablet Size Instructions for use    Albuterol Sulfate (Aero Soln) albuterol 108 (90 BASE) MCG/ACT Inhale 1-2 Puffs by mouth every 6 hours as needed for Shortness of Breath.        AmLODIPine Besylate (Tab) NORVASC 10 MG Take 1 Tab by mouth every day.        Aspirin (Tablet Delayed Response) aspirin 81 MG Take 1 Tab by mouth every day.        Cetirizine HCl (Tab) ZYRTEC 10 MG Take 10 mg by mouth every day.          Cholecalciferol (Tab) cholecalciferol 1000 UNIT Take 1,000 Units by mouth every day.        Cyclobenzaprine HCl (Tab) FLEXERIL 10 MG TAKE 1/2 TO 1 TABLET AT BEDTIME AS NEEDED FOR PAIN/SPASMS        DilTIAZem HCl Coated Beads (CAPSULE SR 24 HR) CARTIA  MG TAKE ONE CAPSULE BY MOUTH DAILY        Fluticasone Propionate (Suspension) FLONASE 50 MCG/ACT  USE ONE SPRAY IN EACH NOSTRIL DAILY        GlipiZIDE (Tab) GLUCOTROL 10 MG Take 1 Tab by mouth 2 times a day.        Glucose Blood (Strip) glucose blood  USE TO TEST BLOOD SUGAR DAILY        Insulin Glargine (Solution Pen-injector) LANTUS 100 UNIT/ML Inject 15 Units as instructed every evening.        Insulin Pen Needle (Misc) Insulin Pen Needle 32G X 4 MM Using one per day with Lantus injection        Lancets (Misc) LIFESCAN FINEPOINT LANCETS  USE TO TEST BLOOD SUGAR DAILY        Lisinopril (Tab) PRINIVIL, ZESTRIL 40 MG TAKE 1 TABLET BY MOUTH DAILY.        MetFORMIN HCl (TABLET SR 24 HR) GLUCOPHAGE  MG TAKE FOUR TABLETS (2000MG)  BY MOUTH DAILY        Mometasone Furo-Formoterol Fum (Aerosol) DULERA 100-5 MCG/ACT INHALE ONE PUFF BY MOUTH TWICE A DAY        NON SPECIFIED   by Other route. 1. Lancettes. Use daily #100 RF x 1 year  2. Glucometer test strips. Use daily #100 RF x 1 year.  Dx: 250.00        Polyethyl Glycol-Propyl Glycol   by Ophthalmic route.        Rosuvastatin Calcium (Tab) CRESTOR 10 MG TAKE 1 TABLET BY MOUTH DAILY.        Rosuvastatin Calcium (Tab) CRESTOR 10 MG TAKE ONE TABLET BY MOUTH DAILY        Sertraline HCl (Tab) ZOLOFT 100 MG TAKE ONE TABLET BY MOUTH DAILY        SITagliptin Phosphate (Tab) JANUVIA 100 MG TAKE ONE TABLET BY MOUTH DAILY        .                 Medicines prescribed today were sent to:     [facility] [id number] [facility] [address]    [address] [place] [address]    Phone: [phone] Fax: [phone]    Open 24 Hours?: No    [facility] - [place], [place] - [address]    [address] [place] [address]    Phone: [phone] Fax: [phone]    Open 24 Hours?: No      Medication refill instructions:       If your prescription bottle indicates you have medication refills left, it is not necessary to call your provider’s office. Please contact your pharmacy and they will refill your medication.    If your prescription  bottle indicates you do not have any refills left, you may request refills at any time through one of the following ways: The online [employer] system (except Urgent Care), by calling your provider’s office, or by asking your pharmacy to contact your provider’s office with a refill request. Medication refills are processed only during regular business hours and may not be available until the next business day. Your provider may request additional information or to have a follow-up visit with you prior to refilling your medication.   *Please Note: Medication refills are assigned a new Rx number when refilled electronically. Your pharmacy may indicate that no refills were authorized even though a new prescription for the same medication is available at the pharmacy. Please request the medicine by name with the pharmacy before contacting your provider for a refill.        Your To Do List     Future Labs/Procedures Complete By Expires    FREE THYROXINE  As directed 6/6/2018    TSH  As directed 6/6/2018    VITAMIN D,25 HYDROXY  As directed 6/6/2018      Referral     A referral request has been sent to our patient care coordination department. Please allow 3-5 business days for us to process this request and contact you either by phone or mail. If you do not hear from us by the 5th business day, please call us at [phone].        Other Notes About Your Plan                    [employer] Access Code: Activation code not generated  Current [employer] Status: Active

## 2017-06-06 NOTE — PROGRESS NOTES
Subjective:      Kenia Oconnell is a 71 y.o. female who presents with Establish Care    Chief Complaint   Patient presents with   • Establish Care     amlodipine makes her leg swell             HPI      New patient health questionnaire reviewed and scanned in to media     Patient Active Problem List    Diagnosis Date Noted   • Elevated TSH  Not on medication  07/01/2016   • Bilateral ocular hypertension  Sees eye Dr at [facility]   appt in 2 weeks 07/06/2015   • Posterior vitreous detachment of right eye 07/06/2015   • Abnormal cardiovascular stress test  Sees cardiology  08/06/2014   • Abnormal CXR 11/22/2013   • DDD (degenerative disc disease), lumbar 08/14/2013   • Hypertriglyceridemia 03/30/2012   • HDL deficiency 03/30/2012   • Lichen sclerosus et atrophicus of the vulva 04/20/2011   • Seasonal allergic rhinitis 04/19/2010   • Vitamin D insufficiency  Takes vitamin D   Last level low in March 04/02/2010   • HTN (hypertension)  Not checking BPs   States that amlodipine makes her legs swell. She quit taking this and swelling improved some but not as fast as she thought it would   She is off amlidipine completely   She is to call and make an appt with cardiology  12/18/2009   • Preventative health care 06/11/2009   • DM (diabetes mellitus), type 2, uncontrolled (CMS-HCC)  Fasting 140 yesterday   Dx 9 years ago  Was on metformin and then glipizide added then januvia added. Then started on lantus 10 units and this really brought her numbers down she reports. A1c 7.9 today from 12.4. Is on insulin and has gained some weight. Has gained 5-6 # only.   Has not seen a specialist for her diabetes  06/11/2009   • Hyperlipidemia with target LDL less than 100 06/11/2009   • COPD (chronic obstructive pulmonary disease) (CMS-HCC)  Also has asthma . She does not see a specialist  06/11/2009   • Major depressive disorder (CMS-HCC) 06/11/2009   • History of Gout when on HCTZ 06/11/2009   • Glaucoma suspect  06/11/2009   • Hyperplastic colon polyp 12/07/2007   • Cervical High Risk HPV Test Positive  Seen by gyn and told this was okay after having a procedure. She needs a referral to gyn  10/18/2006       Family History   Problem Relation Age of Onset   • Heart Disease Father      d. MI 50s   • Lung Disease Mother      d. lung dz   • Cancer Paternal Grandfather      Black Lung   • Diabetes Paternal Grandmother    • Diabetes Other      Paternal Cousin DM1   • Heart Disease Sister 59     mi and CVA -      Social History     Social History   • Marital Status:      Spouse Name: N/A   • Number of Children: 1   • Years of Education: N/A     Occupational History   • Not on file.     Social History Main Topics   • Smoking status: Former Smoker -- 1.00 packs/day for 29 years     Types: Cigarettes     Start date: 01/01/1966     Quit date: 01/01/1995   • Smokeless tobacco: Former User   • Alcohol Use: 0.0 oz/week     0 Standard drinks or equivalent per week      Comment: 1 glass occasionally   • Drug Use: No   • Sexual Activity: Not Currently     Other Topics Concern   • Not on file     Social History Narrative    2013. . Has sig other for many years.        Current outpatient prescriptions:   •  lisinopril (PRINIVIL, ZESTRIL) 40 MG tablet, TAKE 1 TABLET BY MOUTH DAILY., Disp: 90 Tab, Rfl: 1  •  cyclobenzaprine (FLEXERIL) 10 MG Tab, TAKE 1/2 TO 1 TABLET AT BEDTIME AS NEEDED FOR PAIN/SPASMS, Disp: 30 Tab, Rfl: 0  •  DULERA 100-5 MCG/ACT Aerosol, INHALE ONE PUFF BY MOUTH TWICE A DAY, Disp: 1 Inhaler, Rfl: 2  •  rosuvastatin (CRESTOR) 10 MG Tab, TAKE ONE TABLET BY MOUTH DAILY, Disp: 90 Tab, Rfl: 2  •  insulin glargine (LANTUS SOLOSTAR) 100 UNIT/ML Solution Pen-injector injection, Inject 15 Units as instructed every evening., Disp: 15 mL, Rfl: 2  •  Insulin Pen Needle 32G X 4 MM Misc, Using one per day with Lantus injection, Disp: 1 Each, Rfl: 2  •  albuterol 108 (90 BASE) MCG/ACT Aero Soln inhalation aerosol, Inhale  "1-2 Puffs by mouth every 6 hours as needed for Shortness of Breath., Disp: 1 Inhaler, Rfl: 3  •  CARTIA  MG CAPSULE SR 24 HR, TAKE ONE CAPSULE BY MOUTH DAILY, Disp: 90 Cap, Rfl: 0  •  Polyethyl Glycol-Propyl Glycol (SYSTANE FREE OP), by Ophthalmic route., Disp: , Rfl:   •  glipiZIDE (GLUCOTROL) 10 MG Tab, Take 1 Tab by mouth 2 times a day., Disp: 180 Tab, Rfl: 2  •  amlodipine (NORVASC) 10 MG Tab, Take 1 Tab by mouth every day., Disp: 30 Tab, Rfl: 11  •  JANUVIA 100 MG Tab, TAKE ONE TABLET BY MOUTH DAILY, Disp: 90 Tab, Rfl: 1  •  sertraline (ZOLOFT) 100 MG Tab, TAKE ONE TABLET BY MOUTH DAILY, Disp: 90 Tab, Rfl: 1  •  metformin ER (GLUCOPHAGE XR) 500 MG TABLET SR 24 HR, TAKE FOUR TABLETS (2000MG)  BY MOUTH DAILY, Disp: 360 Tab, Rfl: 2  •  glucose blood (ONE TOUCH ULTRA TEST) strip, USE TO TEST BLOOD SUGAR DAILY, Disp: 100 Strip, Rfl: 11  •  CRESTOR 10 MG Tab, TAKE 1 TABLET BY MOUTH DAILY., Disp: 30 Tab, Rfl: 5  •  Silverback SystemsCAN FINEPOINT LANCETS MISC, USE TO TEST BLOOD SUGAR DAILY, Disp: 100 Each, Rfl: 11  •  fluticasone (FLONASE) 50 MCG/ACT nasal spray, USE ONE SPRAY IN EACH NOSTRIL DAILY, Disp: 1 Bottle, Rfl: 5  •  NON SPECIFIED, by Other route. 1. Lancettes. Use daily #100 RF x 1 year 2. Glucometer test strips. Use daily #100 RF x 1 year. Dx: 250.00, Disp: 100 Each, Rfl: 1  •  cetirizine (ZYRTEC) 10 MG TABS, Take 10 mg by mouth every day.  , Disp: , Rfl:   •  vitamin D (CHOLECALCIFEROL) 1000 UNIT TABS, Take 1,000 Units by mouth every day., Disp: , Rfl:   •  ASPIRIN 81 MG PO TBEC, Take 1 Tab by mouth every day., Disp: 30 Each, Rfl: 11    ROS     No CP  No SOB  No nausea, no vomiting  Sleeping okay   New patient health questionnaire reviewed and scanned into media     Objective:     /68 mmHg  Pulse 70  Temp(Src) 37.6 °C (99.7 °F)  Resp 16  Ht 1.549 m (5' 1\")  Wt 59.875 kg (132 lb)  BMI 24.95 kg/m2  SpO2 95%  LMP 01/01/2000     Physical Exam   Constitutional: She appears well-developed and " well-nourished. She is active and cooperative.  Non-toxic appearance. She does not have a sickly appearance. No distress.   HENT:   Head: Normocephalic and atraumatic.   Eyes: Conjunctivae and EOM are normal.   Cardiovascular: Normal rate and regular rhythm.    No LE edema    Pulmonary/Chest: Effort normal and breath sounds normal. No tachypnea. No respiratory distress. She has no decreased breath sounds. She has no wheezes. She has no rhonchi. She has no rales.   Neurological: She is alert. She is not disoriented. She displays no tremor. She displays no seizure activity.   Skin: Skin is warm and dry. She is not diaphoretic.   Psychiatric: She has a normal mood and affect. Her speech is normal and behavior is normal.     No visits with results within 1 Month(s) from this visit.  Latest known visit with results is:    Hospital Outpatient Visit on 03/21/2017   Component Date Value Ref Range Status   • Sodium 03/21/2017 134* 135 - 145 mmol/L Final   • Potassium 03/21/2017 3.9  3.6 - 5.5 mmol/L Final   • Chloride 03/21/2017 102  96 - 112 mmol/L Final   • Co2 03/21/2017 27  20 - 33 mmol/L Final   • Anion Gap 03/21/2017 5.0  0.0 - 11.9 Final   • Glucose 03/21/2017 200* 65 - 99 mg/dL Final   • Bun 03/21/2017 8  8 - 22 mg/dL Final   • Creatinine 03/21/2017 0.78  0.50 - 1.40 mg/dL Final   • Calcium 03/21/2017 9.6  8.5 - 10.5 mg/dL Final   • AST(SGOT) 03/21/2017 31  12 - 45 U/L Final   • ALT(SGPT) 03/21/2017 39  2 - 50 U/L Final   • Alkaline Phosphatase 03/21/2017 94  30 - 99 U/L Final   • Total Bilirubin 03/21/2017 0.4  0.1 - 1.5 mg/dL Final   • Albumin 03/21/2017 4.5  3.2 - 4.9 g/dL Final   • Total Protein 03/21/2017 7.4  6.0 - 8.2 g/dL Final   • Globulin 03/21/2017 2.9  1.9 - 3.5 g/dL Final   • A-G Ratio 03/21/2017 1.6   Final   • WBC 03/21/2017 9.0  4.8 - 10.8 K/uL Final   • RBC 03/21/2017 4.99  4.20 - 5.40 M/uL Final   • Hemoglobin 03/21/2017 14.3  12.0 - 16.0 g/dL Final   • Hematocrit 03/21/2017 43.0  37.0 - 47.0 %  Final   • MCV 03/21/2017 86.2  81.4 - 97.8 fL Final   • MCH 03/21/2017 28.7  27.0 - 33.0 pg Final   • MCHC 03/21/2017 33.3* 33.6 - 35.0 g/dL Final   • RDW 03/21/2017 40.3  35.9 - 50.0 fL Final   • Platelet Count 03/21/2017 195  164 - 446 K/uL Final   • MPV 03/21/2017 12.5  9.0 - 12.9 fL Final   • Neutrophils-Polys 03/21/2017 61.30  44.00 - 72.00 % Final   • Lymphocytes 03/21/2017 21.20* 22.00 - 41.00 % Final   • Monocytes 03/21/2017 9.00  0.00 - 13.40 % Final   • Eosinophils 03/21/2017 6.00  0.00 - 6.90 % Final   • Basophils 03/21/2017 1.70  0.00 - 1.80 % Final   • Immature Granulocytes 03/21/2017 0.80  0.00 - 0.90 % Final   • Nucleated RBC 03/21/2017 0.00   Final   • Neutrophils (Absolute) 03/21/2017 5.53  2.00 - 7.15 K/uL Final    Includes immature neutrophils, if present.   • Lymphs (Absolute) 03/21/2017 1.91  1.00 - 4.80 K/uL Final   • Monos (Absolute) 03/21/2017 0.81  0.00 - 0.85 K/uL Final   • Eos (Absolute) 03/21/2017 0.54* 0.00 - 0.51 K/uL Final   • Baso (Absolute) 03/21/2017 0.15* 0.00 - 0.12 K/uL Final   • Immature Granulocytes (abs) 03/21/2017 0.07  0.00 - 0.11 K/uL Final   • NRBC (Absolute) 03/21/2017 0.00   Final   • Cholesterol,Tot 03/21/2017 121  100 - 199 mg/dL Final   • Triglycerides 03/21/2017 125  0 - 149 mg/dL Final   • HDL 03/21/2017 48  >=40 mg/dL Final   • LDL 03/21/2017 48  <100 mg/dL Final   • 25-Hydroxy   Vitamin D 25 03/21/2017 27* 30 - 100 ng/mL Final    Comment: Adult Ranges:   <20 ng/mL - Deficiency  20-29 ng/mL - Insufficiency   ng/mL - Sufficiency  The Advia Centaur Vitamin D Assay is standardized to the  [facility] reference measurement procedures, a  reference method for the Vitamin D Standardization Program  (VDSP).  The VDSP aligns patient results among 25 (OH)  Vitamin D methods.     • TSH 03/21/2017 5.290* 0.300 - 3.700 uIU/mL Final   • GFR If  03/21/2017 >60  >60 mL/min/1.73 m 2 Final   • GFR If Non  03/21/2017 >60  >60 mL/min/1.73 m  2 Final   • Free T-4 03/21/2017 0.74  0.53 - 1.43 ng/dL Final          A1c 7.9     Assessment/Plan:     1. Uncontrolled type 2 diabetes mellitus without complication, with long-term current use of insulin (CMS-HCC)  A1c today much better at 7.9. No change in medications. We did discuss seeing an endocrinologist and also trying to get her off the insulin however she would like to keep things as is right now. She has gained 5-6 pounds on the insulin. We'll monitor  - POCT  A1C    2. Essential hypertension  No change in BP meds. Patient is to make an appointment with cardiology  - TSH; Future  - FREE THYROXINE; Future    3. Vitamin D insufficiency  Check vitamin D level. Continue supplementation  - VITAMIN D,25 HYDROXY; Future    4. Elevated TSH  Check labs    5. Chronic obstructive pulmonary disease, unspecified COPD type (CMS-HCC)  Stable     6. Cervical High Risk HPV Test Positive  Referral to gyn   - REFERRAL TO GYNECOLOGY       Follow up in one month after labs for physical exam       Please note that this dictation was created using voice recognition software. I have made every reasonable attempt to correct obvious errors, but I expect that there are errors of grammar and possibly content that I did not discover before finalizing the note.

## 2017-06-09 ENCOUNTER — PATIENT OUTREACH (OUTPATIENT)
Dept: HEALTH INFORMATION MANAGEMENT | Facility: OTHER | Age: 72
End: 2017-06-09

## 2017-06-09 NOTE — PROGRESS NOTES
Outcome: Left Message    WebIZ Checked & Epic Updated:  no    HealthConnect Verified: yes    Attempt # 1ST

## 2017-06-15 NOTE — PROGRESS NOTES
Attempt #:2    WebIZ Checked & Epic Updated: yes  HealthConnect Verified: yes  Verify PCP: yes    Communication Preference Obtained: yes     Review Care Team: yes    Annual Wellness Visit Scheduling  1. Scheduling Status:Scheduled        Care Gap Scheduling (Attempt to Schedule EACH Overdue Care Gap!)     Health Maintenance Due   Topic Date Due   • PFT SCREENING-FEV1 AND FEV/FVC RATIO / SPIROMETRY SHOULD BE PERFORMED ANNUALLY  05/19/2017         [employer] Activation: already active  [employer] Jeb: no  Virtual Visits: no  Opt In to Text Messages: no

## 2017-06-21 RX ORDER — DILTIAZEM HYDROCHLORIDE 300 MG/1
300 CAPSULE, COATED, EXTENDED RELEASE ORAL DAILY
Qty: 90 CAP | Refills: 0 | Status: SHIPPED | OUTPATIENT
Start: 2017-06-21 | End: 2017-09-27 | Stop reason: SDUPTHER

## 2017-06-21 NOTE — TELEPHONE ENCOUNTER
Was the patient seen in the last year in this department? Yes     Does patient have an active prescription for medications requested? No     Received Request Via: Patient     Last visit:6/6/17

## 2017-07-05 RX ORDER — SERTRALINE HYDROCHLORIDE 100 MG/1
TABLET, FILM COATED ORAL
Qty: 90 TAB | Refills: 1 | Status: SHIPPED | OUTPATIENT
Start: 2017-07-05 | End: 2018-01-25 | Stop reason: SDUPTHER

## 2017-07-05 RX ORDER — METFORMIN HYDROCHLORIDE 500 MG/1
TABLET, EXTENDED RELEASE ORAL
Qty: 360 TAB | Refills: 2 | Status: SHIPPED | OUTPATIENT
Start: 2017-07-05 | End: 2018-05-08 | Stop reason: SDUPTHER

## 2017-07-05 NOTE — TELEPHONE ENCOUNTER
----- Message from Your Healthcare Team sent at 7/5/2017 11:57 AM PDT -----  Regarding: Prescription Question  Contact: [phone]  I've changed doctors to Lela, but she's not available on my list.  I need the following prescriptions renewed for PPS prescriptions:    JANUVIA 100 MG TABLET (#[id number])   04/03/2017   Dr. RADHA WILKINS   [phone]   0 Refills available     metFORMIN HCL  MG TABLET (#[id number])   03/29/2017   Dr. RADHA WILKINS   [phone]   0 Refills available     SERTRALINE  MG TABLET (#[id number])   03/29/2017   Dr. RADHA WILKINS   [phone]   0 Refills available     All of these refer to Dr. Wilkins

## 2017-07-19 ENCOUNTER — OFFICE VISIT (OUTPATIENT)
Dept: MEDICAL GROUP | Facility: LAB | Age: 72
End: 2017-07-19
Payer: MEDICARE

## 2017-07-19 VITALS
RESPIRATION RATE: 18 BRPM | TEMPERATURE: 98.7 F | SYSTOLIC BLOOD PRESSURE: 142 MMHG | HEART RATE: 77 BPM | BODY MASS INDEX: 25.43 KG/M2 | WEIGHT: 134.7 LBS | OXYGEN SATURATION: 95 % | DIASTOLIC BLOOD PRESSURE: 70 MMHG | HEIGHT: 61 IN

## 2017-07-19 DIAGNOSIS — M51.36 DDD (DEGENERATIVE DISC DISEASE), LUMBAR: ICD-10-CM

## 2017-07-19 DIAGNOSIS — F32.4 MAJOR DEPRESSIVE DISORDER WITH SINGLE EPISODE, IN PARTIAL REMISSION (HCC): ICD-10-CM

## 2017-07-19 DIAGNOSIS — J44.9 CHRONIC OBSTRUCTIVE PULMONARY DISEASE, UNSPECIFIED COPD TYPE (HCC): ICD-10-CM

## 2017-07-19 DIAGNOSIS — R87.810 CERVICAL HIGH RISK HPV (HUMAN PAPILLOMAVIRUS) TEST POSITIVE: ICD-10-CM

## 2017-07-19 DIAGNOSIS — E55.9 VITAMIN D INSUFFICIENCY: ICD-10-CM

## 2017-07-19 DIAGNOSIS — H40.053 BILATERAL OCULAR HYPERTENSION: ICD-10-CM

## 2017-07-19 DIAGNOSIS — Z12.11 SCREENING FOR MALIGNANT NEOPLASM OF COLON: ICD-10-CM

## 2017-07-19 DIAGNOSIS — N90.4 LICHEN SCLEROSUS ET ATROPHICUS OF THE VULVA: ICD-10-CM

## 2017-07-19 DIAGNOSIS — Z00.00 MEDICARE ANNUAL WELLNESS VISIT, SUBSEQUENT: ICD-10-CM

## 2017-07-19 DIAGNOSIS — E78.5 HYPERLIPIDEMIA WITH TARGET LDL LESS THAN 100: ICD-10-CM

## 2017-07-19 DIAGNOSIS — I10 ESSENTIAL HYPERTENSION: ICD-10-CM

## 2017-07-19 DIAGNOSIS — Z87.39 HISTORY OF GOUT: ICD-10-CM

## 2017-07-19 DIAGNOSIS — R79.89 ELEVATED TSH: ICD-10-CM

## 2017-07-19 DIAGNOSIS — R94.39 ABNORMAL CARDIOVASCULAR STRESS TEST: ICD-10-CM

## 2017-07-19 DIAGNOSIS — J30.1 SEASONAL ALLERGIC RHINITIS DUE TO POLLEN: ICD-10-CM

## 2017-07-19 DIAGNOSIS — R93.89 ABNORMAL CXR: ICD-10-CM

## 2017-07-19 DIAGNOSIS — H43.811 POSTERIOR VITREOUS DETACHMENT OF RIGHT EYE: ICD-10-CM

## 2017-07-19 DIAGNOSIS — K63.5 HYPERPLASTIC COLONIC POLYP, UNSPECIFIED PART OF COLON: ICD-10-CM

## 2017-07-19 ASSESSMENT — PATIENT HEALTH QUESTIONNAIRE - PHQ9: CLINICAL INTERPRETATION OF PHQ2 SCORE: 0

## 2017-07-19 ASSESSMENT — PAIN SCALES - GENERAL: PAINLEVEL: NO PAIN

## 2017-07-19 NOTE — MR AVS SNAPSHOT
"        Kenia Oconnell   2017 11:00 AM   Office Visit   MRN: [id number]    Department:  [facility]   Dept Phone:  [phone]    Description:  Female : 1945   Provider:  Kenia Parr M.D.; [facility]            Reason for Visit     Annual Wellness Visit doing fine except for the air quality      Allergies as of 2017     Allergen Noted Reactions    Amlodipine 2017   Swelling    LE edema    Pcn [Penicillins] 2009   Rash    Sulfa Drugs 2009   Rash    Toprol Xl [Metoprolol] 02/15/2017       Fatigue        You were diagnosed with     Medicare annual wellness visit, subsequent   [722934]       Abnormal cardiovascular stress test   [509177]       Abnormal CXR   [810532]       Bilateral ocular hypertension   [770266]       Cervical high risk HPV (human papillomavirus) test positive   [932463]       Chronic obstructive pulmonary disease, unspecified COPD type (CMS-HCC)   [4167769]       DDD (degenerative disc disease), lumbar   [330015]       Uncontrolled type 2 diabetes mellitus with diabetic polyneuropathy, with long-term current use of insulin (CMS-HCC)   [5070927]       Elevated TSH   [501348]       History of gout   [547308]       Essential hypertension   [2672182]       Hyperlipidemia with target LDL less than 100   [601906]       Hyperplastic colonic polyp, unspecified part of colon   [7284787]       Lichen sclerosus et atrophicus of the vulva   [377773]       Major depressive disorder with single episode, in partial remission (CMS-HCC)   [2176639]       Posterior vitreous detachment of right eye   [976745]       Seasonal allergic rhinitis due to pollen   [0086218]       Vitamin D insufficiency   [100068]       Screening for malignant neoplasm of colon   [7021186]         Vital Signs     Blood Pressure Pulse Temperature Respirations Height Weight    142/70 mmHg 77 37.1 °C (98.7 °F) 18 1.549 m (5' 1\") 61.1 kg (134 lb 11.2 oz)    Body Mass " Index Oxygen Saturation Last Menstrual Period Smoking Status          25.46 kg/m2 95% 01/01/2000 Former Smoker        Basic Information     Date Of Birth Sex Race Ethnicity Preferred Language    1945 Female White Non- English      Your appointments     Aug 09, 2017  1:30 PM   New Patient with Reid Mireles M.D.   [facility] ([address])    [address] [place] [address], [address]  [place] [address] [phone]            Oct 17, 2017 11:00 AM   Established Patient with Kenia Parr M.D.   [facility] (--)    [address]  [address]  [place] [id number] [phone]           You will be receiving a confirmation call a few days before your appointment from our automated call confirmation system.              Problem List              ICD-10-CM Priority Class Noted - Resolved    DM (diabetes mellitus), type 2, uncontrolled (CMS-HCC) E11.65   6/11/2009 - Present    Hyperlipidemia with target LDL less than 100 E78.5   6/11/2009 - Present    COPD (chronic obstructive pulmonary disease) (CMS-HCC) J44.9   6/11/2009 - Present    Major depressive disorder (CMS-HCC) F32.9   6/11/2009 - Present    History of Gout when on HCTZ Z87.39   6/11/2009 - Present    Hyperplastic colon polyp K63.5   12/7/2007 - Present    Cervical High Risk HPV Test Positive R87.810   10/18/2006 - Present    HTN (hypertension) I10   12/18/2009 - Present    Vitamin D insufficiency E55.9   4/2/2010 - Present    Seasonal allergic rhinitis J30.2   4/19/2010 - Present    Lichen sclerosus et atrophicus of the vulva N90.4   4/20/2011 - Present    DDD (degenerative disc disease), lumbar M51.36   8/14/2013 - Present    Abnormal CXR R93.8   11/22/2013 - Present    Abnormal cardiovascular stress test R94.39   8/6/2014 - Present    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   7/6/2015 - Present    Posterior vitreous detachment of right eye H43.811   7/6/2015 - Present    Elevated TSH R94.6    7/1/2016 - Present      Health Maintenance        Date Due Completion Dates    IMM INFLUENZA (1) 9/1/2017 11/29/2016, 10/22/2015, 10/13/2014, 10/22/2013, 11/12/2012, 1/5/2011    A1C SCREENING 12/6/2017 6/6/2017, 2/28/2017, 6/27/2016, 5/3/2016, 2/16/2016, 6/18/2015, 2/27/2015, 2/19/2015, 10/20/2014, 6/23/2014, 3/21/2014, 8/7/2013, 5/6/2013, 10/30/2012, 7/11/2011, 12/27/2010, 6/23/2010, 3/23/2010, 12/21/2009, 6/9/2009, 12/9/2008    COLONOSCOPY 12/7/2017 12/7/2007 (Done)    Override on 12/7/2007: Done    URINE ACR / MICROALBUMIN 2/28/2018 2/28/2017, 2/16/2016, 2/19/2015, 3/21/2014, 8/7/2013, 5/6/2013, 3/26/2012, 7/11/2011, 6/23/2010, 12/21/2009, 6/9/2009    DIABETES MONOFILAMENT / LE EXAM 3/2/2018 3/2/2017, 2/23/2016, 6/26/2015, 3/31/2014, 3/31/2014 (Done), 5/14/2013 (Done), 11/12/2012 (Done), 3/30/2012 (Done)    Override on 3/31/2014: Done    Override on 5/14/2013: Done    Override on 11/12/2012: Done    Override on 3/30/2012: Done    FASTING LIPID PROFILE 3/21/2018 3/21/2017, 2/16/2016, 6/18/2015, 10/20/2014, 3/21/2014, 11/14/2013, 10/30/2012, 3/26/2012, 7/11/2011, 6/23/2010, 12/21/2009, 6/9/2009, 12/9/2008    SERUM CREATININE 3/21/2018 3/21/2017, 2/16/2016, 2/16/2016, 6/18/2015, 2/19/2015, 10/20/2014, 3/21/2014, 11/25/2013, 11/15/2013, 5/6/2013, 10/30/2012, 3/26/2012, 7/11/2011, 12/27/2010, 3/23/2010, 6/9/2009, 12/9/2008    RETINAL SCREENING 4/5/2018 4/5/2017, 10/5/2016, 2/3/2016, 3/11/2014    MAMMOGRAM 5/12/2018 5/12/2016, 7/12/2013, 8/9/2011, 7/13/2010, 7/13/2010, 7/9/2009, 7/9/2009    BONE DENSITY 7/12/2018 7/12/2013, 7/9/2009    IMM DTaP/Tdap/Td Vaccine (2 - Td) 2/27/2025 2/27/2015            Current Immunizations     13-VALENT PCV PREVNAR 5/9/2016    Influenza TIV (IM) 10/22/2013, 11/12/2012, 1/5/2011    Influenza Vaccine Adult HD 11/29/2016, 10/22/2015    Influenza Vaccine Quad Inj (Pf) 10/13/2014    Pneumococcal polysaccharide vaccine (PPSV-23) 11/12/2012    SHINGLES VACCINE 7/2/2010    Tdap Vaccine 2/27/2015       Below and/or attached are the medications your provider expects you to take. Review all of your home medications and newly ordered medications with your provider and/or pharmacist. Follow medication instructions as directed by your provider and/or pharmacist. Please keep your medication list with you and share with your provider. Update the information when medications are discontinued, doses are changed, or new medications (including over-the-counter products) are added; and carry medication information at all times in the event of emergency situations     Allergies:  AMLODIPINE - Swelling     PCN - Rash     SULFA DRUGS - Rash     TOPROL XL - (reactions not documented)               Medications  Valid as of: July 19, 2017 - 11:44 AM    Generic Name Brand Name Tablet Size Instructions for use    Albuterol Sulfate (Aero Soln) albuterol 108 (90 BASE) MCG/ACT Inhale 1-2 Puffs by mouth every 6 hours as needed for Shortness of Breath.        Aspirin (Tablet Delayed Response) aspirin 81 MG Take 1 Tab by mouth every day.        Cetirizine HCl (Tab) ZYRTEC 10 MG Take 10 mg by mouth every day.          Cholecalciferol (Tab) cholecalciferol 1000 UNIT Take 1,000 Units by mouth every day.        Cyclobenzaprine HCl (Tab) FLEXERIL 10 MG TAKE 1/2 TO 1 TABLET AT BEDTIME AS NEEDED FOR PAIN/SPASMS        DilTIAZem HCl Coated Beads (CAPSULE SR 24 HR) CARDIZEM  MG Take 1 Cap by mouth every day.        Fluticasone Propionate (Suspension) FLONASE 50 MCG/ACT USE ONE SPRAY IN EACH NOSTRIL DAILY        GlipiZIDE (Tab) GLUCOTROL 10 MG Take 1 Tab by mouth 2 times a day.        Glucose Blood (Strip) glucose blood  USE TO TEST BLOOD SUGAR DAILY        Insulin Glargine (Solution Pen-injector) LANTUS 100 UNIT/ML Inject 15 Units as instructed every evening.        Insulin Pen Needle (Misc) Insulin Pen Needle 32G X 4 MM Using one per day with Lantus injection        Lancets (Misc) SaluspotCAN FINEPOINT LANCETS  USE TO TEST BLOOD SUGAR  DAILY        Lisinopril (Tab) PRINIVIL, ZESTRIL 40 MG TAKE 1 TABLET BY MOUTH DAILY.        MetFORMIN HCl (TABLET SR 24 HR) GLUCOPHAGE  MG TAKE FOUR TABLETS (2000MG)  BY MOUTH DAILY        Mometasone Furo-Formoterol Fum (Aerosol) DULERA 100-5 MCG/ACT INHALE ONE PUFF BY MOUTH TWICE A DAY        NON SPECIFIED   by Other route. 1. Lancettes. Use daily #100 RF x 1 year  2. Glucometer test strips. Use daily #100 RF x 1 year.  Dx: 250.00        Polyethyl Glycol-Propyl Glycol   by Ophthalmic route.        Rosuvastatin Calcium (Tab) CRESTOR 10 MG TAKE 1 TABLET BY MOUTH DAILY.        Rosuvastatin Calcium (Tab) CRESTOR 10 MG TAKE ONE TABLET BY MOUTH DAILY        Sertraline HCl (Tab) ZOLOFT 100 MG TAKE ONE TABLET BY MOUTH DAILY        SITagliptin Phosphate (Tab) JANUVIA 100 MG TAKE ONE TABLET BY MOUTH DAILY        .                 Medicines prescribed today were sent to:     [facility] [address] [facility] [address] [address]    Phone: [phone] Fax: [phone]    Open 24 Hours?: No    [facility] - [facility] [address]    [address] [place] [address]    Phone: [phone] Fax: [phone]    Open 24 Hours?: No      Medication refill instructions:       If your prescription bottle indicates you have medication refills left, it is not necessary to call your provider’s office. Please contact your pharmacy and they will refill your medication.    If your prescription bottle indicates you do not have any refills left, you may request refills at any time through one of the following ways: The online [employer] system (except Urgent Care), by calling your provider’s office, or by asking your pharmacy to contact your provider’s office with a refill request. Medication refills are processed only during regular business hours and may not be available until the next business day. Your provider may request additional information or to have a  follow-up visit with you prior to refilling your medication.   *Please Note: Medication refills are assigned a new Rx number when refilled electronically. Your pharmacy may indicate that no refills were authorized even though a new prescription for the same medication is available at the pharmacy. Please request the medicine by name with the pharmacy before contacting your provider for a refill.        Your To Do List     Future Labs/Procedures Complete By Expires    BASIC METABOLIC PANEL  As directed 7/20/2018    FREE THYROXINE  As directed 7/20/2018    HEMOGLOBIN A1C  As directed 7/20/2018    TSH  As directed 7/20/2018      Referral     A referral request has been sent to our patient care coordination department. Please allow 3-5 business days for us to process this request and contact you either by phone or mail. If you do not hear from us by the 5th business day, please call us at [phone].        Other Notes About Your Plan                    [email or web address] Access Code: Activation code not generated  Current [email or web address] Status: Active

## 2017-07-19 NOTE — PROGRESS NOTES
Chief Complaint   Patient presents with   • Annual Wellness Visit     doing fine except for the air quality         HPI:  Kenia is a 72 y.o. here for Medicare Annual Wellness Visit     Patient Active Problem List    Diagnosis Date Noted   • Elevated TSH 07/01/2016   • Bilateral ocular hypertension 07/06/2015   • Posterior vitreous detachment of right eye 07/06/2015   • Abnormal cardiovascular stress test 08/06/2014   • Abnormal CXR 11/22/2013   • DDD (degenerative disc disease), lumbar 08/14/2013   • Lichen sclerosus et atrophicus of the vulva 04/20/2011   • Seasonal allergic rhinitis 04/19/2010   • Vitamin D insufficiency 04/02/2010   • HTN (hypertension) 12/18/2009   • DM (diabetes mellitus), type 2, uncontrolled (CMS-HCC) 06/11/2009   • Hyperlipidemia with target LDL less than 100 06/11/2009   • COPD (chronic obstructive pulmonary disease) (CMS-HCC) 06/11/2009   • Major depressive disorder (CMS-HCC) 06/11/2009   • History of Gout when on HCTZ 06/11/2009   • Hyperplastic colon polyp 12/07/2007   • Cervical High Risk HPV Test Positive 10/18/2006       Current Outpatient Prescriptions   Medication Sig Dispense Refill   • sitagliptin (JANUVIA) 100 MG Tab TAKE ONE TABLET BY MOUTH DAILY 90 Tab 1   • sertraline (ZOLOFT) 100 MG Tab TAKE ONE TABLET BY MOUTH DAILY 90 Tab 1   • metformin ER (GLUCOPHAGE XR) 500 MG TABLET SR 24 HR TAKE FOUR TABLETS (2000MG)  BY MOUTH DAILY 360 Tab 2   • diltiazem CD (CARTIA XT) 300 MG CAPSULE SR 24 HR Take 1 Cap by mouth every day. 90 Cap 0   • lisinopril (PRINIVIL, ZESTRIL) 40 MG tablet TAKE 1 TABLET BY MOUTH DAILY. 90 Tab 1   • cyclobenzaprine (FLEXERIL) 10 MG Tab TAKE 1/2 TO 1 TABLET AT BEDTIME AS NEEDED FOR PAIN/SPASMS 30 Tab 0   • DULERA 100-5 MCG/ACT Aerosol INHALE ONE PUFF BY MOUTH TWICE A DAY 1 Inhaler 2   • rosuvastatin (CRESTOR) 10 MG Tab TAKE ONE TABLET BY MOUTH DAILY 90 Tab 2   • insulin glargine (LANTUS SOLOSTAR) 100 UNIT/ML Solution Pen-injector injection Inject 15 Units as  instructed every evening. 15 mL 2   • Insulin Pen Needle 32G X 4 MM Misc Using one per day with Lantus injection 1 Each 2   • Polyethyl Glycol-Propyl Glycol (SYSTANE FREE OP) by Ophthalmic route.     • glipiZIDE (GLUCOTROL) 10 MG Tab Take 1 Tab by mouth 2 times a day. 180 Tab 2   • CRESTOR 10 MG Tab TAKE 1 TABLET BY MOUTH DAILY. 30 Tab 5   • fluticasone (FLONASE) 50 MCG/ACT nasal spray USE ONE SPRAY IN EACH NOSTRIL DAILY 1 Bottle 5   • cetirizine (ZYRTEC) 10 MG TABS Take 10 mg by mouth every day.       • vitamin D (CHOLECALCIFEROL) 1000 UNIT TABS Take 1,000 Units by mouth every day.     • ASPIRIN 81 MG PO TBEC Take 1 Tab by mouth every day. 30 Each 11   • albuterol 108 (90 BASE) MCG/ACT Aero Soln inhalation aerosol Inhale 1-2 Puffs by mouth every 6 hours as needed for Shortness of Breath. 1 Inhaler 3   • glucose blood (ONE TOUCH ULTRA TEST) strip USE TO TEST BLOOD SUGAR DAILY 100 Strip 11   • LIFESCAN FINEPOINT LANCETS MISC USE TO TEST BLOOD SUGAR DAILY 100 Each 11   • NON SPECIFIED by Other route. 1. Lancettes. Use daily #100 RF x 1 year  2. Glucometer test strips. Use daily #100 RF x 1 year.  Dx: 250.00 100 Each 1     No current facility-administered medications for this visit.        Patient is taking medications as noted in medication list.   Current supplements as per medication list.   Chronic narcotic pain medicines: no     Allergies: Pcn; Sulfa drugs; and Toprol xl    Current social contact/activities: Out to dinner,  Visiting with neighbors    Is patient current with immunizations? Yes.   If no, due for     She  reports that she quit smoking about 22 years ago. Her smoking use included Cigarettes. She started smoking about 51 years ago. She has a 29 pack-year smoking history. She has never used smokeless tobacco. She reports that she drinks alcohol. She reports that she does not use illicit drugs.  Counseling given: Not Answered        DPA/Advanced directive: Patient does not have an Advanced Directive.  A  packet and workshop information was given on Advanced Directives.     ROS:    Gait: Uses no assistive device   Ostomy: no  Other tubes: no   Amputations: no   Chronic oxygen use no   Last eye exam 4/5/17 Dr. Blakely   Wears hearing aids: no   : Reports incontinence. When coughs or holds urine too lo      Screening:  COPD    1.  Is patient under the care of a pulmonologist? Yes, CareTeam was updated.  2.  Has patient ever completed a PFT or spirometry? Yes, on 5/19/16.  3.  Is patient on oxygen or CPAP? No.  4.  Has patient ever had instruction on inhaler technique by health care professional? Yes  5.  Is patient interested in a referral to respiratory therapy for more information on COPD, inhaler technique, and/or information on establishing an action plan?  No, patient is NOT interested.    DIABETES    Has patient ever had diabetes education? Yes, and is NOT interested in more at this time.    Depression Screening    Little interest or pleasure in doing things?  0 - not at all  Feeling down, depressed, or hopeless? 0 - not at all  Trouble falling or staying asleep, or sleeping too much?     Feeling tired or having little energy?     Poor appetite or overeating?     Feeling bad about yourself - or that you are a failure or have let yourself or your family down?    Trouble concentrating on things, such as reading the newspaper or watching television?    Moving or speaking so slowly that other people could have noticed.  Or the opposite - being so fidgety or restless that you have been moving around a lot more than usual?     Thoughts that you would be better off dead, or of hurting yourself?     Patient Health Questionnaire Score:        If depressive symptoms identified deferred to follow up visit unless specifically addressed in assessment and plan.    Interpretation of PHQ-9 Total Score   Score Severity   1-4 No Depression   5-9 Mild Depression   10-14 Moderate Depression   15-19 Moderately Severe Depression    20-27 Severe Depression      Screening for Cognitive Impairment    Three Minute Recall (apple, watch, ivan)  2/3    Draw clock face with all 12 numbers set to the hand to show 10 minutes past 11 o'clock  1    Cognitive concerns identified deferred for follow up unless specifically addressed in assessment and plan.    Fall Risk Assessment    Has the patient had two or more falls in the last year?  Slipped off bathroom counter    Has the patient one fall with injury in the last year? Yes   Does the patient feel unsteady when standing or walking?   No  Does the patient worry about falling?  No     Safety Assessment    Throw rugs on floor.  Yes  Handrails on all stairs.  Yes  Good lighting in all hallways.  Yes  Difficulty hearing.  No  Patient counseled about all safety risks that were identified.    Functional Assessment ADLs    Are there any barriers preventing you from cooking for yourself or meeting nutritional needs?  No.    Are there any barriers preventing you from driving safely or obtaining transportation?  Yes. Due to cataracts    Are there any barriers preventing you from using a telephone or calling for help?  No.    Are there any barriers preventing you from shopping?  No.    Are there any barriers preventing you from taking care of your own finances?  No.    Are there any barriers preventing you from managing your medications?  No.    Are you currently engaging any exercise or physical activity?  Yes.  Up and down stairs.  Will start swimming soon    Health Maintenance Summary                PFT SCREENING-FEV1 AND FEV/FVC RATIO / SPIROMETRY SHOULD BE PERFORMED ANNUALLY Overdue 5/19/2017      Done 5/19/2016 PFT DICTATED RESULTS     Patient has more history with this topic...    Annual Wellness Visit Overdue 7/2/2017      Done 7/1/2016 Visit Dx: Medicare annual wellness visit, subsequent     Patient has more history with this topic...    IMM INFLUENZA Next Due 9/1/2017      Done 11/29/2016 Imm Admin:  Influenza Vaccine Adult HD     Patient has more history with this topic...    A1C SCREENING Next Due 12/6/2017      Done 6/6/2017 POCT A1C     Patient has more history with this topic...    COLONOSCOPY Next Due 12/7/2017      Done 12/7/2007     URINE ACR / MICROALBUMIN Next Due 2/28/2018      Done 2/28/2017 MICROALBUMIN CREAT RATIO URINE     Patient has more history with this topic...    DIABETES MONOFILAMENT / LE EXAM Next Due 3/2/2018      Done 3/2/2017 AMB DIABETIC MONOFILAMENT LOWER EXTREMITY EXAM     Patient has more history with this topic...    FASTING LIPID PROFILE Next Due 3/21/2018      Done 3/21/2017 LIPID PROFILE     Patient has more history with this topic...    SERUM CREATININE Next Due 3/21/2018      Done 3/21/2017 COMP METABOLIC PANEL (A)     Patient has more history with this topic...    RETINAL SCREENING Next Due 4/5/2018      Done 4/5/2017 REFERRAL FOR RETINAL SCREENING EXAM     Patient has more history with this topic...    MAMMOGRAM Next Due 5/12/2018      Done 5/12/2016 MA-SCREEN MAMMO W/CAD-BILAT     Patient has more history with this topic...    BONE DENSITY Next Due 7/12/2018      Done 7/12/2013 DS-BONE DENSITY STUDY (DEXA)     Patient has more history with this topic...    IMM DTaP/Tdap/Td Vaccine Next Due 2/27/2025      Done 2/27/2015 Imm Admin: Tdap Vaccine          Patient Care Team:  Hermila Cox M.D. as PCP - General (Family Medicine)  Elyse Flores M.D. as Consulting Physician (Cardiology)  Italia Oliveros M.D. as Consulting Physician (Ophthalmology)  Christian Gaitan M.D. as Consulting Physician (Pulmonary Medicine)  Alfredo Munroe M.D. as Consulting Physician (Endocrinology)  Reid Mireles M.D. as Consulting Physician (OB/Gyn)      Social History   Substance Use Topics   • Smoking status: Former Smoker -- 1.00 packs/day for 29 years     Types: Cigarettes     Start date: 01/01/1966     Quit date: 01/01/1995   • Smokeless tobacco: Never Used   • Alcohol Use: 0.0  "oz/week     0 Standard drinks or equivalent per week      Comment: 1 glass occasionally     Family History   Problem Relation Age of Onset   • Heart Disease Father      d. MI 50s   • Lung Disease Mother      d. lung dz   • Cancer Paternal Grandfather      Black Lung   • Diabetes Paternal Grandmother    • Diabetes Other      Paternal Cousin DM1   • Heart Disease Sister 59     mi and CVA -      She  has a past medical history of Type II or unspecified type diabetes mellitus without mention of complication, not stated as uncontrolled (6/11/2009); HTN (6/11/2009); Hyperlipidemia LDL goal < 100 (6/11/2009); Asthma, exogenous (6/11/2009); Depression (6/11/2009); History of Gout when on HCTZ (6/11/2009); Glaucoma suspect (6/11/2009); Hyperplastic colon polyp (12/7/2007); Cervical High Risk HPV Test Positive (10/18/2006); COPD (chronic obstructive pulmonary disease) (CMS-[facility]) (6/11/2009); Vitamin d deficiency (4/2/2010); Personal history of allergy to eggs (4/2/2010); Allergic rhinitis (4/19/2010); Hypertriglyceridemia (3/30/2012); HDL deficiency (3/30/2012); DDD (degenerative disc disease), lumbar (8/14/2013); Abnormal stress electrocardiogram test using treadmill (8/11/2014); Bilateral ocular hypertension (7/6/2015); and Posterior vitreous detachment of right eye (7/6/2015). She also has no past medical history of Encounter for long-term (current) use of other medications.   Past Surgical History   Procedure Laterality Date   • Tonsillectomy  1953   • Sinusotomies  1999   • Dilation and curettage  1981   • Eye surgery  1997     \"laser for eye pressure\"   • Eye surgery Bilateral 2013     Dr. Garrido           Exam:     Blood pressure 142/70, pulse 77, temperature 37.1 °C (98.7 °F), resp. rate 18, height 1.549 m (5' 1\"), weight 61.1 kg (134 lb 11.2 oz), last menstrual period 01/01/2000, SpO2 95 %. Body mass index is 25.46 kg/(m^2).    Hearing good.    Dentition good  Alert, oriented in no acute distress.  Eye contact is good, " speech goal directed, affect calm    Assessment and Plan. The following treatment and monitoring plan is recommended:    1. Medicare annual wellness visit, subsequent  HRA reviewed  Healthcare maintenance is up-to-date    2. Abnormal cardiovascular stress test  Chronic, stable, follows with cardiology    3. Abnormal CXR  Repeat CT chest 7/29/16 does not show any nodule or scarring. There is emphysema present    4. Bilateral ocular hypertension  Chronic, stable, follows with ophthalmology, was told that she no longer has glaucoma as this was a false reading    5. Cervical High Risk HPV Test Positive  Chronic, stable, has an appointment with Dr. Mireles for this. States that this happened in 2006    6. Chronic obstructive pulmonary disease, unspecified COPD type (CMS-[facility])  Chronic, stable, continue dulera    7. DDD (degenerative disc disease), lumbar  Chronic, stable. Patient states pain is not out of control    8. Uncontrolled type 2 diabetes mellitus with diabetic polyneuropathy, with long-term current use of insulin (CMS-HCC)  Chronic, significantly improved with Lantus. Discussed increasing Lantus as she is currently using 10 units at bedtime and fasting blood sugars are anywhere between 105-200  - BASIC METABOLIC PANEL; Future  - HEMOGLOBIN A1C; Future    9. Elevated TSH  Chronic, repeat labs prior to next appointment  - FREE THYROXINE; Future  - TSH; Future    10. History of Gout when on HCTZ  Chronic, occurred in the great toe, no longer on hydrochlorothiazide    11. Essential hypertension  Chronic, needed to stop amlodipine due to lower extremity edema. Currently on lisinopril and blood pressure is mildly elevated today    12. Hyperlipidemia with target LDL less than 100  Chronic, stable, continue Crestor    13. Hyperplastic colonic polyp, unspecified part of colon  Chronic, stable, repeat colonoscopy ordered    14. Lichen sclerosus et atrophicus of the vulva  Chronic, stable, patient has cream for this that  she has not yet started using    15. Major depressive disorder with single episode, in partial remission (CMS-HCC)  Chronic, stable, continue Zoloft    16. Posterior vitreous detachment of right eye  Chronic, stable, follow up with ophthalmology regularly    17. Seasonal allergic rhinitis due to pollen  Chronic, stable, continue Flonase as needed    18. Vitamin D insufficiency  Chronic, stable, counseled on using at least 2000 units of vitamin D every day    19. Screening for malignant neoplasm of colon  - REFERRAL TO GI FOR COLONOSCOPY        Services suggested: No services needed at this time  Health Care Screening recommendations as per orders if indicated.  Referrals offered: PT/OT/Nutrition counseling/Behavioral Health/Smoking cessation as per orders if indicated.    Discussion today about general wellness and lifestyle habits:    · Prevent falls and reduce trip hazards; Cautioned about securing or removing rugs.  · Have a working fire alarm and carbon monoxide detector;   · Engage in regular physical activity and social activities       Follow-up: Return in about 3 months (around 10/19/2017) for thyroid, diabetes .

## 2017-08-09 ENCOUNTER — GYNECOLOGY VISIT (OUTPATIENT)
Dept: OBGYN | Facility: CLINIC | Age: 72
End: 2017-08-09
Payer: MEDICARE

## 2017-08-09 ENCOUNTER — HOSPITAL ENCOUNTER (OUTPATIENT)
Facility: MEDICAL CENTER | Age: 72
End: 2017-08-09
Attending: OBSTETRICS & GYNECOLOGY
Payer: MEDICARE

## 2017-08-09 VITALS
WEIGHT: 135 LBS | DIASTOLIC BLOOD PRESSURE: 62 MMHG | HEIGHT: 61 IN | SYSTOLIC BLOOD PRESSURE: 124 MMHG | BODY MASS INDEX: 25.49 KG/M2

## 2017-08-09 DIAGNOSIS — Z11.51 SCREENING FOR HPV (HUMAN PAPILLOMAVIRUS): ICD-10-CM

## 2017-08-09 DIAGNOSIS — Z01.419 WELL WOMAN EXAM: ICD-10-CM

## 2017-08-09 DIAGNOSIS — Z12.4 ROUTINE CERVICAL SMEAR: ICD-10-CM

## 2017-08-09 DIAGNOSIS — Z12.31 ENCOUNTER FOR SCREENING MAMMOGRAM FOR BREAST CANCER: ICD-10-CM

## 2017-08-09 PROCEDURE — 87624 HPV HI-RISK TYP POOLED RSLT: CPT

## 2017-08-09 PROCEDURE — G0101 CA SCREEN;PELVIC/BREAST EXAM: HCPCS | Performed by: OBSTETRICS & GYNECOLOGY

## 2017-08-09 PROCEDURE — 88175 CYTOPATH C/V AUTO FLUID REDO: CPT

## 2017-08-09 NOTE — PROGRESS NOTES
"Annual examination; new patient  This patient is a 72 y.o. female  postmenopausal, no HRT    Last mammogram-one year ago-normal    /62 mmHg  Ht 1.549 m (5' 1\")  Wt 61.236 kg (135 lb)  BMI 25.52 kg/m2  LMP 2000    Physical examination;  Breast examination- No dominant masses, No skin retraction, No axillary adenopathy    Pelvic examination;  External genitalia-No visible lesions  Vagina-No blood or discharge  Cervix-No gross lesions, Pap smear taken  Uterus-Normal size and shape,  No tenderness  Adnexa No mass or tenderness    Impression;  Normal annual    Plan;     Check PAP  Annual mammogram    "

## 2017-08-09 NOTE — MR AVS SNAPSHOT
"        Kenia Boucher Anish   2017 1:30 PM   Gynecology Visit   MRN: [id number]    Department:  [facility]   Dept Phone:  [phone]    Description:  Female : 1945   Provider:  Reid Mireles M.D.           Reason for Visit     Gynecologic Exam           Allergies as of 2017     Allergen Noted Reactions    Amlodipine 2017   Swelling    LE edema    Pcn [Penicillins] 2009   Rash    Sulfa Drugs 2009   Rash    Toprol Xl [Metoprolol] 02/15/2017       Fatigue        You were diagnosed with     Well woman exam   [325629]       Routine cervical smear   [443134]       Screening for HPV (human papillomavirus)   [555288]       Encounter for screening mammogram for breast cancer   [3910976]         Vital Signs     Blood Pressure Height Weight Body Mass Index Last Menstrual Period Smoking Status    124/62 mmHg 1.549 m (5' 1\") 61.236 kg (135 lb) 25.52 kg/m2 2000 Former Smoker      Basic Information     Date Of Birth Sex Race Ethnicity Preferred Language    1945 Female White Non- English      Your appointments     Oct 17, 2017 11:00 AM   Established Patient with Kenia Parr M.D.   [facility] (--)    [address] [place] [id number] [phone]           You will be receiving a confirmation call a few days before your appointment from our automated call confirmation system.              Problem List              ICD-10-CM Priority Class Noted - Resolved    DM (diabetes mellitus), type 2, uncontrolled (CMS-HCC) E11.65   2009 - Present    Hyperlipidemia with target LDL less than 100 E78.5   2009 - Present    COPD (chronic obstructive pulmonary disease) (CMS-HCC) J44.9   2009 - Present    Major depressive disorder (CMS-HCC) F32.9   2009 - Present    History of Gout when on HCTZ Z87.39   2009 - Present    Hyperplastic colon polyp K63.5   2007 - Present    Cervical High Risk HPV " Test Positive R87.810   10/18/2006 - Present    HTN (hypertension) I10   12/18/2009 - Present    Vitamin D insufficiency E55.9   4/2/2010 - Present    Seasonal allergic rhinitis J30.2   4/19/2010 - Present    Lichen sclerosus et atrophicus of the vulva N90.4   4/20/2011 - Present    DDD (degenerative disc disease), lumbar M51.36   8/14/2013 - Present    Abnormal CXR R93.8   11/22/2013 - Present    Abnormal cardiovascular stress test R94.39   8/6/2014 - Present    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   7/6/2015 - Present    Posterior vitreous detachment of right eye H43.811   7/6/2015 - Present    Elevated TSH R94.6   7/1/2016 - Present      Health Maintenance        Date Due Completion Dates    IMM INFLUENZA (1) 9/1/2017 11/29/2016, 10/22/2015, 10/13/2014, 10/22/2013, 11/12/2012, 1/5/2011    A1C SCREENING 12/6/2017 6/6/2017, 2/28/2017, 6/27/2016, 5/3/2016, 2/16/2016, 6/18/2015, 2/27/2015, 2/19/2015, 10/20/2014, 6/23/2014, 3/21/2014, 8/7/2013, 5/6/2013, 10/30/2012, 7/11/2011, 12/27/2010, 6/23/2010, 3/23/2010, 12/21/2009, 6/9/2009, 12/9/2008    COLONOSCOPY 12/7/2017 12/7/2007 (Done)    Override on 12/7/2007: Done    URINE ACR / MICROALBUMIN 2/28/2018 2/28/2017, 2/16/2016, 2/19/2015, 3/21/2014, 8/7/2013, 5/6/2013, 3/26/2012, 7/11/2011, 6/23/2010, 12/21/2009, 6/9/2009    DIABETES MONOFILAMENT / LE EXAM 3/2/2018 3/2/2017, 2/23/2016, 6/26/2015, 3/31/2014, 3/31/2014 (Done), 5/14/2013 (Done), 11/12/2012 (Done), 3/30/2012 (Done)    Override on 3/31/2014: Done    Override on 5/14/2013: Done    Override on 11/12/2012: Done    Override on 3/30/2012: Done    FASTING LIPID PROFILE 3/21/2018 3/21/2017, 2/16/2016, 6/18/2015, 10/20/2014, 3/21/2014, 11/14/2013, 10/30/2012, 3/26/2012, 7/11/2011, 6/23/2010, 12/21/2009, 6/9/2009, 12/9/2008    SERUM CREATININE 3/21/2018 3/21/2017, 2/16/2016, 2/16/2016, 6/18/2015, 2/19/2015, 10/20/2014, 3/21/2014, 11/25/2013, 11/15/2013, 5/6/2013, 10/30/2012, 3/26/2012, 7/11/2011, 12/27/2010, 3/23/2010,  6/9/2009, 12/9/2008    RETINAL SCREENING 4/5/2018 4/5/2017, 10/5/2016, 2/3/2016, 3/11/2014    MAMMOGRAM 5/12/2018 5/12/2016, 7/12/2013, 8/9/2011, 7/13/2010, 7/13/2010, 7/9/2009, 7/9/2009    BONE DENSITY 7/12/2018 7/12/2013, 7/9/2009    IMM DTaP/Tdap/Td Vaccine (2 - Td) 2/27/2025 2/27/2015            Current Immunizations     13-VALENT PCV PREVNAR 5/9/2016    Influenza TIV (IM) 10/22/2013, 11/12/2012, 1/5/2011    Influenza Vaccine Adult HD 11/29/2016, 10/22/2015    Influenza Vaccine Quad Inj (Pf) 10/13/2014    Pneumococcal polysaccharide vaccine (PPSV-23) 11/12/2012    SHINGLES VACCINE 7/2/2010    Tdap Vaccine 2/27/2015      Below and/or attached are the medications your provider expects you to take. Review all of your home medications and newly ordered medications with your provider and/or pharmacist. Follow medication instructions as directed by your provider and/or pharmacist. Please keep your medication list with you and share with your provider. Update the information when medications are discontinued, doses are changed, or new medications (including over-the-counter products) are added; and carry medication information at all times in the event of emergency situations     Allergies:  AMLODIPINE - Swelling     PCN - Rash     SULFA DRUGS - Rash     TOPROL XL - (reactions not documented)               Medications  Valid as of: August 09, 2017 -  3:02 PM    Generic Name Brand Name Tablet Size Instructions for use    Albuterol Sulfate (Aero Soln) albuterol 108 (90 BASE) MCG/ACT Inhale 1-2 Puffs by mouth every 6 hours as needed for Shortness of Breath.        Aspirin (Tablet Delayed Response) aspirin 81 MG Take 1 Tab by mouth every day.        Cetirizine HCl (Tab) ZYRTEC 10 MG Take 10 mg by mouth every day.          Cholecalciferol (Tab) cholecalciferol 1000 UNIT Take 1,000 Units by mouth every day.        Cyclobenzaprine HCl (Tab) FLEXERIL 10 MG TAKE 1/2 TO 1 TABLET AT BEDTIME AS NEEDED FOR PAIN/SPASMS         DilTIAZem HCl Coated Beads (CAPSULE SR 24 HR) CARDIZEM  MG Take 1 Cap by mouth every day.        Fluticasone Propionate (Suspension) FLONASE 50 MCG/ACT USE ONE SPRAY IN EACH NOSTRIL DAILY        GlipiZIDE (Tab) GLUCOTROL 10 MG Take 1 Tab by mouth 2 times a day.        Glucose Blood (Strip) glucose blood  USE TO TEST BLOOD SUGAR DAILY        Insulin Glargine (Solution Pen-injector) LANTUS 100 UNIT/ML Inject 15 Units as instructed every evening.        Insulin Pen Needle (Misc) Insulin Pen Needle 32G X 4 MM Using one per day with Lantus injection        Lancets (Misc) LIFESCAN FINEPOINT LANCETS  USE TO TEST BLOOD SUGAR DAILY        Lisinopril (Tab) PRINIVIL, ZESTRIL 40 MG TAKE 1 TABLET BY MOUTH DAILY.        MetFORMIN HCl (TABLET SR 24 HR) GLUCOPHAGE  MG TAKE FOUR TABLETS (2000MG)  BY MOUTH DAILY        Mometasone Furo-Formoterol Fum (Aerosol) DULERA 100-5 MCG/ACT INHALE ONE PUFF BY MOUTH TWICE A DAY        NON SPECIFIED   by Other route. 1. Lancettes. Use daily #100 RF x 1 year  2. Glucometer test strips. Use daily #100 RF x 1 year.  Dx: 250.00        Polyethyl Glycol-Propyl Glycol   by Ophthalmic route.        Rosuvastatin Calcium (Tab) CRESTOR 10 MG TAKE 1 TABLET BY MOUTH DAILY.        Rosuvastatin Calcium (Tab) CRESTOR 10 MG TAKE ONE TABLET BY MOUTH DAILY        Sertraline HCl (Tab) ZOLOFT 100 MG TAKE ONE TABLET BY MOUTH DAILY        SITagliptin Phosphate (Tab) JANUVIA 100 MG TAKE ONE TABLET BY MOUTH DAILY        .                 Medicines prescribed today were sent to:     [id number] - [place], [place] - [address] [place] [address]    Phone: [phone] Fax: [phone]    Open 24 Hours?: No    [facility] - [place], [place] - [address]    [address] [place] [address]    Phone: [phone] Fax: [phone]    Open 24 Hours?: No      Medication refill instructions:       If your prescription bottle indicates you have medication  refills left, it is not necessary to call your provider’s office. Please contact your pharmacy and they will refill your medication.    If your prescription bottle indicates you do not have any refills left, you may request refills at any time through one of the following ways: The online [employer] system (except Urgent Care), by calling your provider’s office, or by asking your pharmacy to contact your provider’s office with a refill request. Medication refills are processed only during regular business hours and may not be available until the next business day. Your provider may request additional information or to have a follow-up visit with you prior to refilling your medication.   *Please Note: Medication refills are assigned a new Rx number when refilled electronically. Your pharmacy may indicate that no refills were authorized even though a new prescription for the same medication is available at the pharmacy. Please request the medicine by name with the pharmacy before contacting your provider for a refill.        Your To Do List     Future Labs/Procedures Complete By Expires    MA-SCREEN MAMMO W/CAD-BILAT  As directed 8/9/2018    THINPREP PAP WITH HPV  As directed 8/9/2018      Other Notes About Your Plan                    [employer] Access Code: Activation code not generated  Current [employer] Status: Active

## 2017-08-10 LAB
CYTOLOGY REG CYTOL: NORMAL
HPV HR 12 DNA CVX QL NAA+PROBE: NEGATIVE
HPV16 DNA SPEC QL NAA+PROBE: NEGATIVE
HPV18 DNA SPEC QL NAA+PROBE: NEGATIVE
SPECIMEN SOURCE: NORMAL

## 2017-09-28 RX ORDER — DILTIAZEM HYDROCHLORIDE 300 MG/1
300 CAPSULE, COATED, EXTENDED RELEASE ORAL DAILY
Qty: 90 CAP | Refills: 3 | Status: SHIPPED | OUTPATIENT
Start: 2017-09-28 | End: 2018-11-05 | Stop reason: SDUPTHER

## 2017-09-28 NOTE — TELEPHONE ENCOUNTER
Was the patient seen in the last year in this department? Yes lov 7/19/2017    Does patient have an active prescription for medications requested? No     Received Request Via: Pharmacy

## 2017-10-13 ENCOUNTER — PATIENT OUTREACH (OUTPATIENT)
Dept: HEALTH INFORMATION MANAGEMENT | Facility: OTHER | Age: 72
End: 2017-10-13

## 2017-10-13 ENCOUNTER — APPOINTMENT (OUTPATIENT)
Dept: ADMISSIONS | Facility: MEDICAL CENTER | Age: 72
End: 2017-10-13
Attending: OPHTHALMOLOGY
Payer: MEDICARE

## 2017-10-13 DIAGNOSIS — Z01.810 PRE-OPERATIVE CARDIOVASCULAR EXAMINATION: ICD-10-CM

## 2017-10-13 LAB — EKG IMPRESSION: NORMAL

## 2017-10-13 PROCEDURE — 93010 ELECTROCARDIOGRAM REPORT: CPT | Performed by: INTERNAL MEDICINE

## 2017-10-13 PROCEDURE — 93005 ELECTROCARDIOGRAM TRACING: CPT

## 2017-10-13 NOTE — PROGRESS NOTES
Outbound call to patient for med rec. Left  for patient to call [phone].  1st attempt to reach patient.  Letter and care coordination brochure sent to patient.

## 2017-10-13 NOTE — LETTER
October 13, 2017        Kenia Oconnell  [address] [place] [address]        Dear Kenia:    Your Care Coordinator feels you would benefit from a medication review with a pharmacist. I am sorry I have been unable to reach you via phone. As a clinical pharmacist with [facility] Care Coordination, I am working with your health care providers to ensure optimal medication therapy. This service is available to you at no cost.  By participating in this review, I will:     • Review your medications, vitamins and other over-the-counter items.    • Assure there are no harmful interactions with your medications.  • Lower your out-of-pocket prescription costs, if possible.   • Communicate medication concerns between your healthcare specialists.  • Provide you with wellness, healthy lifestyle, and disease prevention strategies.  • Refer you to a nurse or  if needed.  • Consider remote health monitoring if you have high blood pressure.  • Answer any questions that you may have about your medications.    Please contact me at [phone] to discuss your medications. I am available Monday through Friday from 8am to 5pm. I look forward to hearing from you.   Sincerely,        Reyna Garsia, PharmD    Electronically Signed

## 2017-10-17 ENCOUNTER — HOSPITAL ENCOUNTER (OUTPATIENT)
Facility: MEDICAL CENTER | Age: 72
End: 2017-10-17
Attending: OPHTHALMOLOGY | Admitting: OPHTHALMOLOGY
Payer: MEDICARE

## 2017-10-17 ENCOUNTER — PATIENT OUTREACH (OUTPATIENT)
Dept: HEALTH INFORMATION MANAGEMENT | Facility: OTHER | Age: 72
End: 2017-10-17

## 2017-10-17 VITALS
OXYGEN SATURATION: 94 % | WEIGHT: 134.04 LBS | DIASTOLIC BLOOD PRESSURE: 99 MMHG | BODY MASS INDEX: 25.31 KG/M2 | SYSTOLIC BLOOD PRESSURE: 176 MMHG | RESPIRATION RATE: 16 BRPM | TEMPERATURE: 97.6 F | HEIGHT: 61 IN | HEART RATE: 69 BPM

## 2017-10-17 PROBLEM — H25.812 COMBINED FORMS OF AGE-RELATED CATARACT OF LEFT EYE: Status: ACTIVE | Noted: 2017-10-17

## 2017-10-17 LAB — GLUCOSE BLD-MCNC: 138 MG/DL (ref 65–99)

## 2017-10-17 PROCEDURE — 700111 HCHG RX REV CODE 636 W/ 250 OVERRIDE (IP)

## 2017-10-17 PROCEDURE — 500855 HCHG NEEDLE, IRRIG CYSTOTOME 27G: Performed by: OPHTHALMOLOGY

## 2017-10-17 PROCEDURE — 160035 HCHG PACU - 1ST 60 MINS PHASE I: Performed by: OPHTHALMOLOGY

## 2017-10-17 PROCEDURE — 502000 HCHG MISC OR IMPLANTS RC 0278: Performed by: OPHTHALMOLOGY

## 2017-10-17 PROCEDURE — 700101 HCHG RX REV CODE 250

## 2017-10-17 PROCEDURE — 82962 GLUCOSE BLOOD TEST: CPT

## 2017-10-17 PROCEDURE — 500791 HCHG KNIFE, SLIT: Performed by: OPHTHALMOLOGY

## 2017-10-17 PROCEDURE — 160048 HCHG OR STATISTICAL LEVEL 1-5: Performed by: OPHTHALMOLOGY

## 2017-10-17 PROCEDURE — 160029 HCHG SURGERY MINUTES - 1ST 30 MINS LEVEL 4: Performed by: OPHTHALMOLOGY

## 2017-10-17 PROCEDURE — 501749 HCHG SHELL REV 276: Performed by: OPHTHALMOLOGY

## 2017-10-17 PROCEDURE — 99152 MOD SED SAME PHYS/QHP 5/>YRS: CPT | Performed by: OPHTHALMOLOGY

## 2017-10-17 PROCEDURE — 160002 HCHG RECOVERY MINUTES (STAT): Performed by: OPHTHALMOLOGY

## 2017-10-17 DEVICE — LENS PRE-LOADED TECNIS CLEAR MONO 6.0MM 18D: Type: IMPLANTABLE DEVICE | Status: FUNCTIONAL

## 2017-10-17 RX ORDER — SODIUM CHLORIDE, SODIUM LACTATE, POTASSIUM CHLORIDE, CALCIUM CHLORIDE 600; 310; 30; 20 MG/100ML; MG/100ML; MG/100ML; MG/100ML
INJECTION, SOLUTION INTRAVENOUS CONTINUOUS
Status: DISCONTINUED | OUTPATIENT
Start: 2017-10-17 | End: 2017-10-17 | Stop reason: HOSPADM

## 2017-10-17 RX ORDER — KETOROLAC TROMETHAMINE 5 MG/ML
SOLUTION OPHTHALMIC
Status: COMPLETED
Start: 2017-10-17 | End: 2017-10-17

## 2017-10-17 RX ORDER — TROPICAMIDE 10 MG/ML
SOLUTION/ DROPS OPHTHALMIC
Status: COMPLETED
Start: 2017-10-17 | End: 2017-10-17

## 2017-10-17 RX ORDER — MOXIFLOXACIN 5 MG/ML
SOLUTION/ DROPS OPHTHALMIC
Status: COMPLETED
Start: 2017-10-17 | End: 2017-10-17

## 2017-10-17 RX ORDER — MIDAZOLAM HYDROCHLORIDE 1 MG/ML
INJECTION INTRAMUSCULAR; INTRAVENOUS
Status: DISCONTINUED
Start: 2017-10-17 | End: 2017-10-17 | Stop reason: HOSPADM

## 2017-10-17 RX ORDER — PHENYLEPHRINE HYDROCHLORIDE 25 MG/ML
SOLUTION/ DROPS OPHTHALMIC
Status: COMPLETED
Start: 2017-10-17 | End: 2017-10-17

## 2017-10-17 RX ORDER — TETRACAINE HYDROCHLORIDE 5 MG/ML
SOLUTION OPHTHALMIC
Status: COMPLETED
Start: 2017-10-17 | End: 2017-10-17

## 2017-10-17 RX ADMIN — TETRACAINE HYDROCHLORIDE 1 DROP: 5 SOLUTION OPHTHALMIC at 07:14

## 2017-10-17 RX ADMIN — TROPICAMIDE 1 DROP: 10 SOLUTION/ DROPS OPHTHALMIC at 07:14

## 2017-10-17 RX ADMIN — KETOROLAC TROMETHAMINE 1 DROP: 5 SOLUTION OPHTHALMIC at 07:14

## 2017-10-17 RX ADMIN — MOXIFLOXACIN HYDROCHLORIDE 1 DROP: 5 SOLUTION/ DROPS OPHTHALMIC at 07:14

## 2017-10-17 RX ADMIN — PHENYLEPHRINE HYDROCHLORIDE 1 DROP: 2.5 SOLUTION/ DROPS OPHTHALMIC at 07:14

## 2017-10-17 RX ADMIN — SODIUM CHLORIDE, SODIUM LACTATE, POTASSIUM CHLORIDE, CALCIUM CHLORIDE 1000 ML: 600; 310; 30; 20 INJECTION, SOLUTION INTRAVENOUS at 07:30

## 2017-10-17 ASSESSMENT — PAIN SCALES - GENERAL
PAINLEVEL_OUTOF10: 0

## 2017-10-17 NOTE — PROGRESS NOTES
Outbound call to patient for med rec. Left  for patient to call [phone].  2nd attempt to reach patient.

## 2017-10-17 NOTE — DISCHARGE INSTRUCTIONS
HOME CARE INSTRUCTIONS FOR CATARACT SURGERY    ACTIVITY: Rest and take it easy for the first 24 hours. We strongly suggest that a responsible adult remain with you during that time. It is normal to feel sleepy. We encourage you to not do anything that requires balance, judgment or coordination. Be extra careful when walking (with a dilated eye, it is easier to trip and fall).     FOR 24 HOURS, DO NOT:       Drive, operate machinery or run household appliances.        Drink beer or alcoholic beverages.        Make important decisions or sign legal documents.     DIET: To avoid nausea, slowly advance diet as tolerated, avoiding spicy or greasy foods for the first meal.     MEDICATIONS: Resume taking daily medication. You may take Tylenol for mild discomfort, if needed.     SURGICAL DRESSING: Eye shield as instructed by your doctor. Dark glasses should be worn while in the sunlight.     Follow your Physician's instruction Sheet. Eye Kit Given    A follow-up appointment is scheduled with your doctor tomorrow at _7:30AM.     You should call 911 if you develop problems with breathing or chest pain.  You should CALL YOUR PHYSICIAN if you develop: Sharp stabbing pain or sudden change in vision in your operative eye. If you are unable to contact your doctor or the surgical center, you should go to the nearest emergency room or urgent care center. Physician's telephone # [phone] __________________________    If any questions arise, call your doctor. If your doctor is not available, please feel free to call Same Day Surgery at [phone]. You can also call the [employer] Hotline open 24 hours/day, 7 days/week and speak to a nurse at [phone] or [phone].     I acknowledge receipt and understanding of these Home Care Instructions.    ______________________________     _______________________________            Signature of Patient / Responsible Adult                                                       RN Signature    A  registered nurse may call you a few days after your surgery to see how you are doing.   You may also receive a survey in the mail within the next two weeks and we ask that you take a few moments to complete and return the survey. Our goal is to provide you with very good care and we value your comments. Thank you for choosing [facility].

## 2017-10-17 NOTE — OR NURSING
0843 received pt from OR with Dr Perez, left eye dilated. Pt denies pain or nausea- juice given  0859 discharge instructions given to patient and , both verbalize understanding.   0901 Patient discharged, WC to car.

## 2017-10-20 ENCOUNTER — PATIENT MESSAGE (OUTPATIENT)
Dept: MEDICAL GROUP | Facility: LAB | Age: 72
End: 2017-10-20

## 2017-10-20 ENCOUNTER — HOSPITAL ENCOUNTER (OUTPATIENT)
Dept: LAB | Facility: MEDICAL CENTER | Age: 72
End: 2017-10-20
Attending: FAMILY MEDICINE
Payer: MEDICARE

## 2017-10-20 DIAGNOSIS — R79.89 ELEVATED TSH: ICD-10-CM

## 2017-10-20 LAB
ANION GAP SERPL CALC-SCNC: 9 MMOL/L (ref 0–11.9)
BUN SERPL-MCNC: 11 MG/DL (ref 8–22)
CALCIUM SERPL-MCNC: 10.3 MG/DL (ref 8.5–10.5)
CHLORIDE SERPL-SCNC: 102 MMOL/L (ref 96–112)
CO2 SERPL-SCNC: 26 MMOL/L (ref 20–33)
CREAT SERPL-MCNC: 0.81 MG/DL (ref 0.5–1.4)
EST. AVERAGE GLUCOSE BLD GHB EST-MCNC: 197 MG/DL
GFR SERPL CREATININE-BSD FRML MDRD: >60 ML/MIN/1.73 M 2
GLUCOSE SERPL-MCNC: 244 MG/DL (ref 65–99)
HBA1C MFR BLD: 8.5 % (ref 0–5.6)
POTASSIUM SERPL-SCNC: 4.2 MMOL/L (ref 3.6–5.5)
SODIUM SERPL-SCNC: 137 MMOL/L (ref 135–145)
T4 FREE SERPL-MCNC: 0.69 NG/DL (ref 0.53–1.43)
TSH SERPL DL<=0.005 MIU/L-ACNC: 2.57 UIU/ML (ref 0.3–3.7)

## 2017-10-20 PROCEDURE — 80048 BASIC METABOLIC PNL TOTAL CA: CPT

## 2017-10-20 PROCEDURE — 36415 COLL VENOUS BLD VENIPUNCTURE: CPT

## 2017-10-20 PROCEDURE — 83036 HEMOGLOBIN GLYCOSYLATED A1C: CPT

## 2017-10-20 PROCEDURE — 84443 ASSAY THYROID STIM HORMONE: CPT

## 2017-10-20 PROCEDURE — 84439 ASSAY OF FREE THYROXINE: CPT

## 2017-10-26 ENCOUNTER — PATIENT OUTREACH (OUTPATIENT)
Dept: HEALTH INFORMATION MANAGEMENT | Facility: OTHER | Age: 72
End: 2017-10-26

## 2017-10-26 NOTE — PROGRESS NOTES
Outbound call to patient for med rec. Returning patient's call. Left vm for patient to call [phone].  Patient is interested in a med review. Will attempt to reach at a later time.

## 2017-10-31 ENCOUNTER — PATIENT OUTREACH (OUTPATIENT)
Dept: HEALTH INFORMATION MANAGEMENT | Facility: OTHER | Age: 72
End: 2017-10-31

## 2017-10-31 NOTE — PROGRESS NOTES
Outbound call to patient for med rec. Left  for patient to call [phone].  3rd attempt to reach patient.

## 2017-11-07 ENCOUNTER — HOSPITAL ENCOUNTER (OUTPATIENT)
Facility: MEDICAL CENTER | Age: 72
End: 2017-11-07
Attending: OPHTHALMOLOGY | Admitting: OPHTHALMOLOGY
Payer: MEDICARE

## 2017-11-07 VITALS
TEMPERATURE: 97.7 F | HEIGHT: 61 IN | BODY MASS INDEX: 25.76 KG/M2 | OXYGEN SATURATION: 93 % | WEIGHT: 136.47 LBS | DIASTOLIC BLOOD PRESSURE: 92 MMHG | RESPIRATION RATE: 16 BRPM | HEART RATE: 66 BPM | SYSTOLIC BLOOD PRESSURE: 174 MMHG

## 2017-11-07 LAB
GLUCOSE BLD-MCNC: 110 MG/DL (ref 65–99)
GLUCOSE BLD-MCNC: 121 MG/DL (ref 65–99)

## 2017-11-07 PROCEDURE — 82962 GLUCOSE BLOOD TEST: CPT

## 2017-11-07 PROCEDURE — 160002 HCHG RECOVERY MINUTES (STAT): Performed by: OPHTHALMOLOGY

## 2017-11-07 PROCEDURE — 501749 HCHG SHELL REV 276: Performed by: OPHTHALMOLOGY

## 2017-11-07 PROCEDURE — 700111 HCHG RX REV CODE 636 W/ 250 OVERRIDE (IP)

## 2017-11-07 PROCEDURE — 160035 HCHG PACU - 1ST 60 MINS PHASE I: Performed by: OPHTHALMOLOGY

## 2017-11-07 PROCEDURE — 500855 HCHG NEEDLE, IRRIG CYSTOTOME 27G: Performed by: OPHTHALMOLOGY

## 2017-11-07 PROCEDURE — 160048 HCHG OR STATISTICAL LEVEL 1-5: Performed by: OPHTHALMOLOGY

## 2017-11-07 PROCEDURE — 500791 HCHG KNIFE, SLIT: Performed by: OPHTHALMOLOGY

## 2017-11-07 PROCEDURE — 99152 MOD SED SAME PHYS/QHP 5/>YRS: CPT | Performed by: OPHTHALMOLOGY

## 2017-11-07 PROCEDURE — 700101 HCHG RX REV CODE 250

## 2017-11-07 PROCEDURE — 160029 HCHG SURGERY MINUTES - 1ST 30 MINS LEVEL 4: Performed by: OPHTHALMOLOGY

## 2017-11-07 DEVICE — LENS PRE-LOADED TECNIS CLEAR MONO 6.0MM 21D: Type: IMPLANTABLE DEVICE | Status: FUNCTIONAL

## 2017-11-07 RX ORDER — KETOROLAC TROMETHAMINE 5 MG/ML
1 SOLUTION OPHTHALMIC
Status: COMPLETED | OUTPATIENT
Start: 2017-11-07 | End: 2017-11-07

## 2017-11-07 RX ORDER — SODIUM CHLORIDE, SODIUM LACTATE, POTASSIUM CHLORIDE, CALCIUM CHLORIDE 600; 310; 30; 20 MG/100ML; MG/100ML; MG/100ML; MG/100ML
INJECTION, SOLUTION INTRAVENOUS CONTINUOUS
Status: DISCONTINUED | OUTPATIENT
Start: 2017-11-07 | End: 2017-11-07 | Stop reason: HOSPADM

## 2017-11-07 RX ORDER — MOXIFLOXACIN 5 MG/ML
SOLUTION/ DROPS OPHTHALMIC
Status: COMPLETED
Start: 2017-11-07 | End: 2017-11-07

## 2017-11-07 RX ORDER — TROPICAMIDE 10 MG/ML
SOLUTION/ DROPS OPHTHALMIC
Status: COMPLETED
Start: 2017-11-07 | End: 2017-11-07

## 2017-11-07 RX ORDER — TETRACAINE HYDROCHLORIDE 5 MG/ML
SOLUTION OPHTHALMIC
Status: COMPLETED
Start: 2017-11-07 | End: 2017-11-07

## 2017-11-07 RX ORDER — PHENYLEPHRINE HYDROCHLORIDE 25 MG/ML
SOLUTION/ DROPS OPHTHALMIC
Status: COMPLETED
Start: 2017-11-07 | End: 2017-11-07

## 2017-11-07 RX ADMIN — MOXIFLOXACIN HYDROCHLORIDE 1 DROP: 5 SOLUTION/ DROPS OPHTHALMIC at 07:10

## 2017-11-07 RX ADMIN — TETRACAINE HYDROCHLORIDE 1 DROP: 5 SOLUTION OPHTHALMIC at 07:10

## 2017-11-07 RX ADMIN — TROPICAMIDE 1 DROP: 10 SOLUTION/ DROPS OPHTHALMIC at 07:10

## 2017-11-07 RX ADMIN — KETOROLAC TROMETHAMINE 1 DROP: 5 SOLUTION OPHTHALMIC at 07:10

## 2017-11-07 RX ADMIN — PHENYLEPHRINE HYDROCHLORIDE 1 DROP: 2.5 SOLUTION/ DROPS OPHTHALMIC at 07:10

## 2017-11-07 RX ADMIN — SODIUM CHLORIDE, SODIUM LACTATE, POTASSIUM CHLORIDE, CALCIUM CHLORIDE: 600; 310; 30; 20 INJECTION, SOLUTION INTRAVENOUS at 07:15

## 2017-11-07 ASSESSMENT — PAIN SCALES - GENERAL
PAINLEVEL_OUTOF10: 0

## 2017-11-07 NOTE — DISCHARGE INSTRUCTIONS
HOME CARE INSTRUCTIONS FOR CATARACT SURGERY    ACTIVITY: Rest and take it easy for the first 24 hours. We strongly suggest that a responsible adult remain with you during that time. It is normal to feel sleepy. We encourage you to not do anything that requires balance, judgment or coordination. Be extra careful when walking (with a dilated eye, it is easier to trip and fall).     FOR 24 HOURS, DO NOT:       Drive, operate machinery or run household appliances.        Drink beer or alcoholic beverages.        Make important decisions or sign legal documents.     DIET: To avoid nausea, slowly advance diet as tolerated, avoiding spicy or greasy foods for the first meal.     MEDICATIONS: Resume taking daily medication. You may take Tylenol for mild discomfort, if needed.     SURGICAL DRESSING: Eye shield as instructed by your doctor. Dark glasses should be worn while in the sunlight.     Follow your Physician's instruction Sheet. Eye Kit Given    A follow-up appointment is scheduled with your doctor tomorrow at _12:45 Pm    You should call 911 if you develop problems with breathing or chest pain.  You should CALL YOUR PHYSICIAN if you develop: Sharp stabbing pain or sudden change in vision in your operative eye. If you are unable to contact your doctor or the surgical center, you should go to the nearest emergency room or urgent care center. Physician's telephone # ______Dr. Arce [phone]____________________    If any questions arise, call your doctor. If your doctor is not available, please feel free to call Same Day Surgery at [phone]. You can also call the [employer] Hotline open 24 hours/day, 7 days/week and speak to a nurse at [phone] or [phone].     I acknowledge receipt and understanding of these Home Care Instructions.    ______________________________     _______________________________            Signature of Patient / Responsible Adult                                                       RN  Signature    A registered nurse may call you a few days after your surgery to see how you are doing.   You may also receive a survey in the mail within the next two weeks and we ask that you take a few moments to complete and return the survey. Our goal is to provide you with very good care and we value your comments. Thank you for choosing [facility].

## 2017-11-21 DIAGNOSIS — I10 ESSENTIAL HYPERTENSION: ICD-10-CM

## 2017-11-21 RX ORDER — LISINOPRIL 40 MG/1
40 TABLET ORAL DAILY
Qty: 90 TAB | Refills: 0 | Status: SHIPPED | OUTPATIENT
Start: 2017-11-21 | End: 2018-03-05 | Stop reason: SDUPTHER

## 2017-12-05 ENCOUNTER — OFFICE VISIT (OUTPATIENT)
Dept: MEDICAL GROUP | Facility: LAB | Age: 72
End: 2017-12-05
Payer: MEDICARE

## 2017-12-05 VITALS
HEART RATE: 80 BPM | RESPIRATION RATE: 16 BRPM | WEIGHT: 137 LBS | TEMPERATURE: 97.8 F | SYSTOLIC BLOOD PRESSURE: 146 MMHG | BODY MASS INDEX: 25.86 KG/M2 | DIASTOLIC BLOOD PRESSURE: 84 MMHG | OXYGEN SATURATION: 94 % | HEIGHT: 61 IN

## 2017-12-05 DIAGNOSIS — K76.0 FATTY LIVER: ICD-10-CM

## 2017-12-05 DIAGNOSIS — I51.7 LEFT VENTRICULAR HYPERTROPHY: ICD-10-CM

## 2017-12-05 DIAGNOSIS — E78.5 HYPERLIPIDEMIA WITH TARGET LDL LESS THAN 100: ICD-10-CM

## 2017-12-05 DIAGNOSIS — M51.36 DDD (DEGENERATIVE DISC DISEASE), LUMBAR: ICD-10-CM

## 2017-12-05 LAB
HBA1C MFR BLD: 7.9 % (ref ?–5.8)
INT CON NEG: NEGATIVE
INT CON POS: POSITIVE

## 2017-12-05 PROCEDURE — 99214 OFFICE O/P EST MOD 30 MIN: CPT | Performed by: FAMILY MEDICINE

## 2017-12-05 PROCEDURE — 83036 HEMOGLOBIN GLYCOSYLATED A1C: CPT | Performed by: FAMILY MEDICINE

## 2017-12-05 RX ORDER — CYCLOBENZAPRINE HCL 10 MG
TABLET ORAL
Qty: 30 TAB | Refills: 3 | Status: SHIPPED | OUTPATIENT
Start: 2017-12-05 | End: 2018-10-03 | Stop reason: SDUPTHER

## 2017-12-05 NOTE — PROGRESS NOTES
Subjective:   Kenia Oconnell is a 72 y.o. female here today for   Chief Complaint   Patient presents with   • Diabetes       1. Uncontrolled type 2 diabetes mellitus with diabetic polyneuropathy, with long-term current use of insulin (CMS-[facility])  This is chronic. Stable. Currently takingLantus 10-15 units daily at bedtime, metformin X are 2000 mg nightly, glipizide 10 mg twice a day as directed. She is also taking a daily aspirin, statin, and ACE-I/ARB.   Denies side effects or hypoglycemic events from medications.  Last HbA1c is 7.9% today, down from 8.5% one half months ago  She is monitoring blood sugar regularly at home. Fasting blood sugars are 110-200  Reports diet is improving  She is not exercising regularly.  Last eye exam: Yearly 4/2017  Doing regular foot exams and care.  Denies polyuria, polydipsia, blurred vision, early satiety, or neuropathies.    2. Left ventricular hypertrophy  This is a new problem. During her eye surgery, a EKG did show LVH. She does have significant cardiac history including hypertension, CAD, hyperlipidemia. She sees Dr. Flores with cardiology. Blood pressure slightly elevated today    3. Fatty liver  This is chronic. Patient had CT chest in July 2016 which showed likely fatty liver. She has never had any further evaluation for this. She has diabetes. No increasing abdominal girth    4. DDD (degenerative disc disease), lumbar  Chronic. Patient takes Flexeril as needed. She is not using this every night. She says that when she gets a muscle spasm in her back or neck. It does not make her lightheaded. She has not had any falls while on the medication.    Current medicines (including changes today)  Current Outpatient Prescriptions   Medication Sig Dispense Refill   • cyclobenzaprine (FLEXERIL) 10 MG Tab TAKE 1/2 TO 1 TABLET AT BEDTIME AS NEEDED FOR PAIN/SPASMS 30 Tab 3   • lisinopril (PRINIVIL, ZESTRIL) 40 MG tablet Take 1 Tab by mouth every day. Needs to be seen for further  refills. Thank you 90 Tab 0   • sitagliptin (JANUVIA) 100 MG Tab TAKE ONE TABLET BY MOUTH DAILY 90 Tab 3   • diltiazem CD (CARTIA XT) 300 MG CAPSULE SR 24 HR Take 1 Cap by mouth every day. 90 Cap 3   • Mometasone Furo-Formoterol Fum (DULERA) 100-5 MCG/ACT Aerosol Inhale 1 Puff by mouth 2 Times a Day. 3 Inhaler 3   • sertraline (ZOLOFT) 100 MG Tab TAKE ONE TABLET BY MOUTH DAILY 90 Tab 1   • metformin ER (GLUCOPHAGE XR) 500 MG TABLET SR 24 HR TAKE FOUR TABLETS (2000MG)  BY MOUTH DAILY 360 Tab 2   • rosuvastatin (CRESTOR) 10 MG Tab TAKE ONE TABLET BY MOUTH DAILY 90 Tab 2   • insulin glargine (LANTUS SOLOSTAR) 100 UNIT/ML Solution Pen-injector injection Inject 15 Units as instructed every evening. 15 mL 2   • Insulin Pen Needle 32G X 4 MM Misc Using one per day with Lantus injection 1 Each 2   • albuterol 108 (90 BASE) MCG/ACT Aero Soln inhalation aerosol Inhale 1-2 Puffs by mouth every 6 hours as needed for Shortness of Breath. 1 Inhaler 3   • Polyethyl Glycol-Propyl Glycol (SYSTANE FREE OP) by Ophthalmic route.     • glipiZIDE (GLUCOTROL) 10 MG Tab Take 1 Tab by mouth 2 times a day. 180 Tab 2   • glucose blood (ONE TOUCH ULTRA TEST) strip USE TO TEST BLOOD SUGAR DAILY 100 Strip 11   • CRESTOR 10 MG Tab TAKE 1 TABLET BY MOUTH DAILY. 30 Tab 5   • [employer] FINEPOINT LANCETS MISC USE TO TEST BLOOD SUGAR DAILY 100 Each 11   • fluticasone (FLONASE) 50 MCG/ACT nasal spray USE ONE SPRAY IN EACH NOSTRIL DAILY 1 Bottle 5   • cetirizine (ZYRTEC) 10 MG TABS Take 10 mg by mouth every day.       • vitamin D (CHOLECALCIFEROL) 1000 UNIT TABS Take 1,000 Units by mouth every day.     • NON SPECIFIED by Other route. 1. Lancettes. Use daily #100 RF x 1 year  2. Glucometer test strips. Use daily #100 RF x 1 year.  Dx: 250.00 100 Each 1     No current facility-administered medications for this visit.      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); Cataract; Cervical High Risk HPV Test Positive (10/18/2006); COPD (chronic obstructive pulmonary disease) (CMS-HCC) (6/11/2009); DDD (degenerative disc disease), lumbar (8/14/2013); Depression (6/11/2009); Emphysema of lung (CMS-HCC); Glaucoma; Glaucoma suspect (6/11/2009); HDL deficiency (3/30/2012); Heart burn; History of Gout when on HCTZ (6/11/2009); Hyperlipidemia LDL goal < 100 (6/11/2009); Hyperplastic colon polyp (12/7/2007); Hypertension; Hypertriglyceridemia (3/30/2012); Personal history of allergy to eggs (4/2/2010); Posterior vitreous detachment of right eye (7/6/2015); Seizure (CMS-HCC) (1951); Type II or unspecified type diabetes mellitus without mention of complication, not stated as uncontrolled; and Vitamin d deficiency (4/2/2010). She also has no past medical history of Encounter for long-term (current) use of other medications.    ROS   No fevers  No bowel changes  No LE edema       Objective:     Blood pressure 146/84, pulse 80, temperature 36.6 °C (97.8 °F), resp. rate 16, height 1.549 m (5' 1\"), weight 62.1 kg (137 lb), last menstrual period 01/01/2000, SpO2 94 %. Body mass index is 25.89 kg/m².   Physical Exam:  Constitutional: Alert, no distress.  Skin: Warm, dry, good turgor, no rashes in visible areas.  Eye: Equal, round and reactive, conjunctiva clear, lids normal.  ENMT: Lips without lesions, good dentition, oropharynx clear.  Neck: Trachea midline, no masses, no thyromegaly. No cervical or supraclavicular lymphadenopathy  Respiratory: Unlabored respiratory effort, lungs clear to auscultation, no wheezes, no ronchi.  Cardiovascular: Normal S1, S2, RRR,  no edema.  Abdomen: Soft, non-tender, no masses, no hepatosplenomegaly.  Psych: Alert and oriented x3, normal affect and mood.      Assessment and Plan:   The following treatment plan was discussed    1. Uncontrolled type 2 diabetes mellitus with diabetic " polyneuropathy, with long-term current use of insulin (CMS-HCC)  Chronic, improved over last 2 months   HbA1c 7.9% today  We did discuss changing her Lantus by 3 units to help get fasting blood sugar between   Patient has never seen endocrinology. I do think that it is appropriate that she be evaluated since her HbA1c fluctuates so much between 7 and 12  Eye exam, foot exams UTD   May consider gabapentin if no improvement of neuropathy  Recheck 3 months  - POCT Hemoglobin A1C  - REFERRAL TO ENDOCRINOLOGY  - LIPID PROFILE; Future  - COMP METABOLIC PANEL; Future  - HEMOGLOBIN A1C; Future  - MICROALBUMIN CREAT RATIO URINE; Future    2. Left ventricular hypertrophy  Chronic, patient will call and make an appointment with her cardiologist. We did discuss blood pressure control and she will start home blood pressure monitoring    3. Fatty liver  Chronic. We will need to monitor this. At the next visit, we will discuss liver ultrasound for further evaluation. Likely secondary to elevated sugar levels    4. DDD (degenerative disc disease), lumbar  Chronic, stable on Flexeril.  - cyclobenzaprine (FLEXERIL) 10 MG Tab; TAKE 1/2 TO 1 TABLET AT BEDTIME AS NEEDED FOR PAIN/SPASMS  Dispense: 30 Tab; Refill: 3    5. Hyperlipidemia with target LDL less than 100  - LIPID PROFILE; Future  - COMP METABOLIC PANEL; Future      Followup: Return in about 3 months (around 3/5/2018) for DM, liver.       This note was created using voice recognition software. I have made every reasonable attempt to correct errors, however, I do anticipate some grammatical errors.

## 2017-12-28 DIAGNOSIS — E78.5 DM TYPE 2 WITH DIABETIC DYSLIPIDEMIA (HCC): ICD-10-CM

## 2017-12-28 DIAGNOSIS — E11.69 DM TYPE 2 WITH DIABETIC DYSLIPIDEMIA (HCC): ICD-10-CM

## 2017-12-28 RX ORDER — GLIPIZIDE 10 MG/1
10 TABLET ORAL 2 TIMES DAILY
Qty: 180 TAB | Refills: 3 | Status: SHIPPED | OUTPATIENT
Start: 2017-12-28 | End: 2019-04-16 | Stop reason: SDUPTHER

## 2017-12-29 ENCOUNTER — PATIENT MESSAGE (OUTPATIENT)
Dept: MEDICAL GROUP | Facility: LAB | Age: 72
End: 2017-12-29

## 2017-12-29 DIAGNOSIS — Z12.11 SCREEN FOR COLON CANCER: ICD-10-CM

## 2017-12-29 NOTE — TELEPHONE ENCOUNTER
From: Kenia Oconnell  To: Kenia Parr M.D.  Sent: 12/28/2017 10:21 PM PST  Subject: RE:colon cancer screening    Of course.  ----- Message -----  From: Kenia Parr M.D.  Sent: 12/28/2017 7:42 AM PST  To: Kenia Oconnell  Subject: colon cancer screening  Guicho Aquino,     I hope you're well. I just noticed that your colonoscopy was due this month while approving a prescription refill. Is it ok if I put in a referral for this to be done before we see each other next?     Thanks!  Kenia Parr M.D.

## 2018-01-25 RX ORDER — SERTRALINE HYDROCHLORIDE 100 MG/1
TABLET, FILM COATED ORAL
Qty: 90 TAB | Refills: 3 | Status: SHIPPED | OUTPATIENT
Start: 2018-01-25 | End: 2019-04-08 | Stop reason: SDUPTHER

## 2018-03-05 ENCOUNTER — PATIENT OUTREACH (OUTPATIENT)
Dept: HEALTH INFORMATION MANAGEMENT | Facility: OTHER | Age: 73
End: 2018-03-05

## 2018-03-05 DIAGNOSIS — I10 ESSENTIAL HYPERTENSION: ICD-10-CM

## 2018-03-05 RX ORDER — LISINOPRIL 40 MG/1
40 TABLET ORAL DAILY
Qty: 30 TAB | Refills: 0 | Status: SHIPPED | OUTPATIENT
Start: 2018-03-05 | End: 2018-04-06 | Stop reason: SDUPTHER

## 2018-03-05 NOTE — PROGRESS NOTES
Med rec completed. Please see medication review pharmacy follow up smart form for complete medication review.

## 2018-03-06 ENCOUNTER — HOSPITAL ENCOUNTER (OUTPATIENT)
Dept: LAB | Facility: MEDICAL CENTER | Age: 73
End: 2018-03-06
Attending: FAMILY MEDICINE
Payer: MEDICARE

## 2018-03-06 DIAGNOSIS — E78.5 HYPERLIPIDEMIA WITH TARGET LDL LESS THAN 100: ICD-10-CM

## 2018-03-06 LAB
ALBUMIN SERPL BCP-MCNC: 4.8 G/DL (ref 3.2–4.9)
ALBUMIN/GLOB SERPL: 1.6 G/DL
ALP SERPL-CCNC: 75 U/L (ref 30–99)
ALT SERPL-CCNC: 27 U/L (ref 2–50)
ANION GAP SERPL CALC-SCNC: 8 MMOL/L (ref 0–11.9)
AST SERPL-CCNC: 27 U/L (ref 12–45)
BILIRUB SERPL-MCNC: 0.4 MG/DL (ref 0.1–1.5)
BUN SERPL-MCNC: 10 MG/DL (ref 8–22)
CALCIUM SERPL-MCNC: 10.1 MG/DL (ref 8.5–10.5)
CHLORIDE SERPL-SCNC: 104 MMOL/L (ref 96–112)
CHOLEST SERPL-MCNC: 135 MG/DL (ref 100–199)
CO2 SERPL-SCNC: 25 MMOL/L (ref 20–33)
CREAT SERPL-MCNC: 0.97 MG/DL (ref 0.5–1.4)
CREAT UR-MCNC: 26.6 MG/DL
EST. AVERAGE GLUCOSE BLD GHB EST-MCNC: 192 MG/DL
GLOBULIN SER CALC-MCNC: 3 G/DL (ref 1.9–3.5)
GLUCOSE SERPL-MCNC: 131 MG/DL (ref 65–99)
HBA1C MFR BLD: 8.3 % (ref 0–5.6)
HDLC SERPL-MCNC: 52 MG/DL
LDLC SERPL CALC-MCNC: 55 MG/DL
MICROALBUMIN UR-MCNC: <0.7 MG/DL
MICROALBUMIN/CREAT UR: NORMAL MG/G (ref 0–30)
POTASSIUM SERPL-SCNC: 4.1 MMOL/L (ref 3.6–5.5)
PROT SERPL-MCNC: 7.8 G/DL (ref 6–8.2)
SODIUM SERPL-SCNC: 137 MMOL/L (ref 135–145)
TRIGL SERPL-MCNC: 138 MG/DL (ref 0–149)

## 2018-03-06 PROCEDURE — 80053 COMPREHEN METABOLIC PANEL: CPT

## 2018-03-06 PROCEDURE — 80061 LIPID PANEL: CPT

## 2018-03-06 PROCEDURE — 83036 HEMOGLOBIN GLYCOSYLATED A1C: CPT

## 2018-03-06 PROCEDURE — 82570 ASSAY OF URINE CREATININE: CPT

## 2018-03-06 PROCEDURE — 82043 UR ALBUMIN QUANTITATIVE: CPT

## 2018-03-06 PROCEDURE — 36415 COLL VENOUS BLD VENIPUNCTURE: CPT

## 2018-03-12 ENCOUNTER — TELEPHONE (OUTPATIENT)
Dept: MEDICAL GROUP | Facility: LAB | Age: 73
End: 2018-03-12

## 2018-03-12 NOTE — TELEPHONE ENCOUNTER
ESTABLISHED PATIENT PRE-VISIT PLANNING     Note: Patient will not be contacted if there is no indication to call.     1.  Reviewed notes from the last few office visits within the medical group: Yes    2.  If any orders were placed at last visit or intended to be done for this visit (i.e. 6 mos follow-up), do we have Results/Consult Notes?        •  Labs - Labs ordered, completed on 3/6/18 and results are in chart.       •  Imaging - Imaging was not ordered at last office visit.       •  Referrals - No referrals were ordered at last office visit.    3. Is this appointment scheduled as a Hospital Follow-Up? No    4.  Immunizations were updated in [employer] using WebIZ?: Yes       •  Web Iz Recommendations: TD    5.  Patient is due for the following Health Maintenance Topics:   Health Maintenance Due   Topic Date Due   • PFT SCREENING-FEV1 AND FEV/FVC RATIO / SPIROMETRY SHOULD BE PERFORMED ANNUALLY  05/19/2017   • IMM INFLUENZA (1) 09/01/2017   • COLONOSCOPY  12/07/2017   • DIABETES MONOFILAMENT / LE EXAM  03/02/2018       - Patient has completed FLU, PNEUMOVAX (PPSV23), PREVNAR (PCV13) , TDAP and ZOSTAVAX (Shingles) Immunization(s) per WebIZ. Chart has been updated.    6.  MDX printed and highlighted for Provider? YES    7.  Patient was NOT informed to arrive 15 min prior to their scheduled appointment and bring in their medication bottles.

## 2018-03-13 ENCOUNTER — OFFICE VISIT (OUTPATIENT)
Dept: MEDICAL GROUP | Facility: LAB | Age: 73
End: 2018-03-13
Payer: MEDICARE

## 2018-03-13 VITALS
WEIGHT: 136 LBS | HEIGHT: 61 IN | SYSTOLIC BLOOD PRESSURE: 140 MMHG | TEMPERATURE: 97.2 F | BODY MASS INDEX: 25.68 KG/M2 | HEART RATE: 80 BPM | DIASTOLIC BLOOD PRESSURE: 68 MMHG | RESPIRATION RATE: 16 BRPM | OXYGEN SATURATION: 93 %

## 2018-03-13 DIAGNOSIS — K52.9 GASTROENTERITIS: ICD-10-CM

## 2018-03-13 DIAGNOSIS — K76.0 FATTY LIVER: ICD-10-CM

## 2018-03-13 DIAGNOSIS — I51.7 LEFT VENTRICULAR HYPERTROPHY: ICD-10-CM

## 2018-03-13 DIAGNOSIS — F32.4 MAJOR DEPRESSIVE DISORDER WITH SINGLE EPISODE, IN PARTIAL REMISSION (HCC): ICD-10-CM

## 2018-03-13 PROCEDURE — 99214 OFFICE O/P EST MOD 30 MIN: CPT | Performed by: FAMILY MEDICINE

## 2018-03-14 PROBLEM — F32.4 MAJOR DEPRESSIVE DISORDER WITH SINGLE EPISODE, IN PARTIAL REMISSION (HCC): Status: ACTIVE | Noted: 2018-03-14

## 2018-03-15 NOTE — PROGRESS NOTES
Subjective:   Kenia Oconnell is a 72 y.o. female here today for   Chief Complaint   Patient presents with   • Diabetes Mellitus       1. Uncontrolled type 2 diabetes mellitus with diabetic polyneuropathy, with long-term current use of insulin (CMS-HCC)  This is chronic. Stable. Currently takingLantus 10 units daily at bedtime, metformin XR 2000 mg nightly, Januvia 100mg daily, glipizide 10 mg twice a day as directed. She is also taking a daily aspirin, statin, and ACE-I/ARB.   Denies side effects or hypoglycemic events from medications in over a year.  Last HbA1c is 8.3% today, up from 7.9% 3 months ago  She is monitoring blood sugar regularly at home. Fasting blood sugars are 110-200  Reports diet is improving  She is not exercising regularly.  Last eye exam: Yearly 4/2017  Doing regular foot exams and care.  Denies polyuria, polydipsia, blurred vision, early satiety, or neuropathies.    2. Fatty liver  Chronic, found on CT. Has never had ultrasound. No thrombocytopenia. Does have a history of diabetes as above.    3. Gastroenteritis  This is a new a problem to discuss. Patient reports 3 days of diarrhea with some nausea and one episode of vomiting. She felt that this was secondary to low blood sugar but her blood sugar was normal. She denies any fevers or chills. Resolved on its own.    4. Left ventricular hypertrophy  Chronic. Long history of hypertension. Found on EKG in a pre-surgical clearance. No chest pain. No heart attacks. Sees cardiology regularly.    5. Major depressive disorder with single episode, in partial remission (CMS-HCC)  Chronic. Has been on Zoloft for many years. Stable on this medication. No suicidal ideation.      Current medicines (including changes today)  Current Outpatient Prescriptions   Medication Sig Dispense Refill   • aspirin EC (ECOTRIN) 81 MG Tablet Delayed Response Take 81 mg by mouth every day.     • lisinopril (PRINIVIL, ZESTRIL) 40 MG tablet Take 1 Tab by mouth every  day. Needs to be seen for further refills. Thank you 30 Tab 0   • sertraline (ZOLOFT) 100 MG Tab TAKE 1 TABLET BY MOUTH DAILY. 90 Tab 3   • glipiZIDE (GLUCOTROL) 10 MG Tab Take 1 Tab by mouth 2 times a day. 180 Tab 3   • sitagliptin (JANUVIA) 100 MG Tab TAKE ONE TABLET BY MOUTH DAILY 90 Tab 3   • diltiazem CD (CARTIA XT) 300 MG CAPSULE SR 24 HR Take 1 Cap by mouth every day. 90 Cap 3   • Mometasone Furo-Formoterol Fum (DULERA) 100-5 MCG/ACT Aerosol Inhale 1 Puff by mouth 2 Times a Day. 3 Inhaler 3   • metformin ER (GLUCOPHAGE XR) 500 MG TABLET SR 24 HR TAKE FOUR TABLETS (2000MG)  BY MOUTH DAILY 360 Tab 2   • rosuvastatin (CRESTOR) 10 MG Tab TAKE ONE TABLET BY MOUTH DAILY 90 Tab 2   • insulin glargine (LANTUS SOLOSTAR) 100 UNIT/ML Solution Pen-injector injection Inject 15 Units as instructed every evening. 15 mL 2   • Polyethyl Glycol-Propyl Glycol (SYSTANE FREE OP) by Ophthalmic route.     • CRESTOR 10 MG Tab TAKE 1 TABLET BY MOUTH DAILY. 30 Tab 5   • fluticasone (FLONASE) 50 MCG/ACT nasal spray USE ONE SPRAY IN EACH NOSTRIL DAILY 1 Bottle 5   • cetirizine (ZYRTEC) 10 MG TABS Take 10 mg by mouth every day.       • vitamin D (CHOLECALCIFEROL) 1000 UNIT TABS Take 1,000 Units by mouth every day.     • cyclobenzaprine (FLEXERIL) 10 MG Tab TAKE 1/2 TO 1 TABLET AT BEDTIME AS NEEDED FOR PAIN/SPASMS 30 Tab 3   • Insulin Pen Needle 32G X 4 MM Misc Using one per day with Lantus injection 1 Each 2   • albuterol 108 (90 BASE) MCG/ACT Aero Soln inhalation aerosol Inhale 1-2 Puffs by mouth every 6 hours as needed for Shortness of Breath. 1 Inhaler 3   • glucose blood (ONE TOUCH ULTRA TEST) strip USE TO TEST BLOOD SUGAR DAILY 100 Strip 11   • LIFESCAN FINEPOINT LANCETS MISC USE TO TEST BLOOD SUGAR DAILY 100 Each 11   • NON SPECIFIED by Other route. 1. Lancettes. Use daily #100 RF x 1 year  2. Glucometer test strips. Use daily #100 RF x 1 year.  Dx: 250.00 100 Each 1     No current facility-administered medications for this visit.   "    She  has a past medical history of Abnormal stress electrocardiogram test using treadmill (8/11/2014); Allergic rhinitis (4/19/2010); Arthritis; Asthma, exogenous (6/11/2009); Bilateral ocular hypertension (7/6/2015); Cataract; Cervical High Risk HPV Test Positive (10/18/2006); COPD (chronic obstructive pulmonary disease) (CMS-HCC) (6/11/2009); DDD (degenerative disc disease), lumbar (8/14/2013); Depression (6/11/2009); Emphysema of lung (CMS-HCC); Glaucoma; Glaucoma suspect (6/11/2009); HDL deficiency (3/30/2012); Heart burn; History of Gout when on HCTZ (6/11/2009); Hyperlipidemia LDL goal < 100 (6/11/2009); Hyperplastic colon polyp (12/7/2007); Hypertension; Hypertriglyceridemia (3/30/2012); Personal history of allergy to eggs (4/2/2010); Posterior vitreous detachment of right eye (7/6/2015); Seizure (CMS-HCC) (1953); Type II or unspecified type diabetes mellitus without mention of complication, not stated as uncontrolled; and Vitamin d deficiency (4/2/2010). She also has no past medical history of Encounter for long-term (current) use of other medications.    ROS   No fevers  No bowel changes  No LE edema       Objective:     Blood pressure 140/68, pulse 80, temperature 36.2 °C (97.2 °F), resp. rate 16, height 1.549 m (5' 1\"), weight 61.7 kg (136 lb), last menstrual period 01/01/2000, SpO2 93 %. Body mass index is 25.7 kg/m².   Physical Exam:  Constitutional: Alert, no distress.  Skin: Warm, dry, good turgor, no rashes in visible areas.  Eye: Equal, round and reactive, conjunctiva clear, lids normal.  ENMT: Lips without lesions, good dentition, oropharynx clear.  Neck: Trachea midline, no masses, no thyromegaly. No cervical or supraclavicular lymphadenopathy  Respiratory: Unlabored respiratory effort, lungs clear to auscultation, no wheezes, no ronchi.  Cardiovascular: Normal S1, S2, RRR, no murmur, no edema.  Abdomen: Soft, non-tender, no masses, no hepatosplenomegaly.  Psych: Alert and oriented x3, normal " affect and mood.  Monofilament testing with a 10 gram force: sensation: decreased on left  Visual Inspection: Feet with maceration, ulcers, or fissures. there is a significant amount of calluses on both the hand in the feet. She states that this is genetic  Pedal pulses: intact bilaterally      Assessment and Plan:   The following treatment plan was discussed    1. Uncontrolled type 2 diabetes mellitus with diabetic polyneuropathy, with long-term current use of insulin (CMS-[facility])  Chronic, worsening. Hemoglobin A1c 8.3%. Discussed increasing her Lantus to 15 units at bedtime and monitoring blood sugar closely. Discussed referral to endocrinology. This referral had been placed at the last visit. She is still interested in going. I have given her this information today. Continue foot exams, eye exams, ACE inhibitor, statin, aspirin. Discussed podiatry referral. Patient declines at this time.  - Diabetic Monofilament Lower Extremity Exam    2. Fatty liver  This is chronic, ultrasound ordered for further evaluation. Discussed importance of taking glucose control to help with this.  - US-LIVER AND BILIARY TREE; Future    3. Gastroenteritis  This is a new problem, resolved on its own    4. Left ventricular hypertrophy  This is a chronic problem, importance of keeping blood pressure under good range discussed    5. Major depressive disorder with single episode, in partial remission (CMS-[facility])  Chronic, stable on Zoloft without side effects      Followup: Return in about 4 months (around 7/13/2018) for [facility] annual .       This note was created using voice recognition software. I have made every reasonable attempt to correct errors, however, I do anticipate some grammatical errors.

## 2018-03-20 ENCOUNTER — PATIENT OUTREACH (OUTPATIENT)
Dept: HEALTH INFORMATION MANAGEMENT | Facility: OTHER | Age: 73
End: 2018-03-20

## 2018-03-20 NOTE — PROGRESS NOTES
1. Attempt #: 2    2. [facility] Verified: yes    3. Verify PCP: yes    4. Care Team Updated:       •   DME Company (gait device, O2, CPAP, etc.): YES       •   Other Specialists (eye doctor, derm, GYN, cardiology, endo, etc): YES    5.  Reviewed/Updated the following with patient:       •   Communication Preference Obtained? YES       •   Preferred Pharmacy? YES       •   Preferred Lab? YES       •   Family History (document living status of immediate family members and if + hx of cancer, diabetes, hypertension, hyperlipidemia, heart attack, stroke) YES. Was Abstract Encounter opened and chart updated? YES    6. "[employer] Activation: already active    7. "[employer] Jeb: no    8. Annual Wellness Visit Scheduling  Scheduling Status:Scheduled      9. Care Gap Scheduling (Attempt to Schedule EACH Overdue Care Gap!)     Health Maintenance Due   Topic Date Due   • PFT SCREENING-FEV1 AND FEV/FVC RATIO / SPIROMETRY SHOULD BE PERFORMED ANNUALLY  05/19/2017   • IMM INFLUENZA (1) 09/01/2017        Scheduled patient for Annual Wellness Visit    10. Patient was advised: “This is a free wellness visit. The provider will screen for medical conditions to help you stay healthy. If you have other concerns to address you may be asked to discuss these at a separate visit or there may be an additional fee.”     11. Patient was informed to arrive 15 min prior to their scheduled appointment and bring in their medication bottles.

## 2018-03-20 NOTE — PROGRESS NOTES
Outcome: Left Message to reschedule awv    Please transfer to Patient Outreach Team at [phone] when patient returns call.        Attempt # 1

## 2018-04-04 ENCOUNTER — OFFICE VISIT (OUTPATIENT)
Dept: CARDIOLOGY | Facility: MEDICAL CENTER | Age: 73
End: 2018-04-04
Payer: MEDICARE

## 2018-04-04 VITALS
WEIGHT: 135 LBS | HEIGHT: 61 IN | HEART RATE: 72 BPM | SYSTOLIC BLOOD PRESSURE: 142 MMHG | BODY MASS INDEX: 25.49 KG/M2 | DIASTOLIC BLOOD PRESSURE: 70 MMHG | OXYGEN SATURATION: 94 %

## 2018-04-04 DIAGNOSIS — Z00.00 PREVENTATIVE HEALTH CARE: ICD-10-CM

## 2018-04-04 DIAGNOSIS — Z79.4 TYPE 2 DIABETES MELLITUS WITH HYPERGLYCEMIA, WITH LONG-TERM CURRENT USE OF INSULIN (HCC): ICD-10-CM

## 2018-04-04 DIAGNOSIS — E11.65 TYPE 2 DIABETES MELLITUS WITH HYPERGLYCEMIA, WITH LONG-TERM CURRENT USE OF INSULIN (HCC): ICD-10-CM

## 2018-04-04 DIAGNOSIS — J44.9 CHRONIC OBSTRUCTIVE PULMONARY DISEASE, UNSPECIFIED COPD TYPE (HCC): ICD-10-CM

## 2018-04-04 DIAGNOSIS — Z79.899 HIGH RISK MEDICATION USE: ICD-10-CM

## 2018-04-04 DIAGNOSIS — I10 ESSENTIAL HYPERTENSION: ICD-10-CM

## 2018-04-04 DIAGNOSIS — Z82.49 FAMILY HISTORY OF EARLY CAD: ICD-10-CM

## 2018-04-04 PROCEDURE — 99214 OFFICE O/P EST MOD 30 MIN: CPT | Performed by: INTERNAL MEDICINE

## 2018-04-04 RX ORDER — SPIRONOLACTONE 25 MG/1
25 TABLET ORAL DAILY
Qty: 30 TAB | Refills: 3 | Status: SHIPPED | OUTPATIENT
Start: 2018-04-04 | End: 2018-05-11 | Stop reason: SDUPTHER

## 2018-04-04 ASSESSMENT — ENCOUNTER SYMPTOMS
WEIGHT LOSS: 0
DEPRESSION: 0
CLAUDICATION: 0
EYE DISCHARGE: 0
SHORTNESS OF BREATH: 0
ABDOMINAL PAIN: 0
PALPITATIONS: 0
SENSORY CHANGE: 0
CHILLS: 0
DIZZINESS: 0
FEVER: 0
VOMITING: 0
HALLUCINATIONS: 0
BRUISES/BLEEDS EASILY: 0
PND: 0
NAUSEA: 0
BLOOD IN STOOL: 0
EYE PAIN: 0
LOSS OF CONSCIOUSNESS: 0
BLURRED VISION: 0
HEADACHES: 0
COUGH: 0
MYALGIAS: 0
DOUBLE VISION: 0
FALLS: 0
SPEECH CHANGE: 0
ORTHOPNEA: 0

## 2018-04-04 NOTE — LETTER
"     [facility]   [address], [address]  [place] [address]  Phone: [phone]  Fax: [phone]              Kenia Oconnell  1945    Encounter Date: 4/4/2018    Elyse Flores M.D.          PROGRESS NOTE:  Chief Complaint   Patient presents with   • HTN (Uncontrolled)       Subjective:   Kenia Oconnell is a 71 y.o. female who presents today for cardiac care of CAD, HTN, HLP, DM.  In the interim, patient has been doing well without having any symptoms. Patient denies having chest pain, dyspnea, palpitation, presyncope, syncope episodes. Able to climb up at least 2 flights of stairs.    LDL is within goal.   Renal function and Liver function are adequate.    Blood pressure is elevated.    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   • Cataract     bilat   • Cervical High Risk HPV Test Positive 10/18/2006   • COPD (chronic obstructive pulmonary disease) (CMS-HCC) 6/11/2009   • DDD (degenerative disc disease), lumbar 8/14/2013   • Depression 6/11/2009   • Emphysema of lung (CMS-HCC)    • Glaucoma    • Glaucoma suspect 6/11/2009   • HDL deficiency 3/30/2012   • Heart burn    • History of Gout when on HCTZ 6/11/2009   • Hyperlipidemia LDL goal < 100 6/11/2009   • Hyperplastic colon polyp 12/7/2007   • Hypertension    • Hypertriglyceridemia 3/30/2012   • Personal history of allergy to eggs 4/2/2010   • Posterior vitreous detachment of right eye 7/6/2015   • Seizure (CMS-HCC) 1953    \"sun stroke\"   • Type II or unspecified type diabetes mellitus without mention of complication, not stated as uncontrolled     oral meds and insulin   • Vitamin d deficiency 4/2/2010     Past Surgical History:   Procedure Laterality Date   • CATARACT PHACO WITH IOL Right 11/7/2017   " "Procedure: CATARACT PHACO WITH IOL;  Surgeon: Carlos Arce M.D.;  Location: SURGERY SAME DAY [facility] ORS;  Service: Ophthalmology   • CATARACT PHACO WITH IOL Left 10/17/2017    Procedure: CATARACT PHACO WITH IOL;  Surgeon: Carlos Arce M.D.;  Location: SURGERY SAME DAY [facility] OR[facility];  Service: Ophthalmology   • EYE SURGERY Bilateral 2013    Dr. Garrido   • SINUSOTOMIES  1999   • EYE SURGERY  1997    \"laser for eye pressure\"   • DILATION AND CURETTAGE  1981   • TONSILLECTOMY  1953     Family History   Problem Relation Age of Onset   • Heart Disease Father      d. MI 50s   • Hypertension Father    • Heart Attack Father    • Lung Disease Mother      d. lung dz   • Hypertension Mother    • Cancer Paternal Grandfather      Black Lung   • Diabetes Paternal Grandmother    • Heart Disease Sister 59     mi and CVA -    • Stroke Sister    • Heart Attack Brother    • Diabetes Other      Paternal Cousin DM1     Social History     Social History   • Marital status:      Spouse name: N/A   • Number of children: 1   • Years of education: N/A     Occupational History   • Not on file.     Social History Main Topics   • Smoking status: Former Smoker     Packs/day: 1.00     Years: 29.00     Types: Cigarettes     Start date: 1/1/1966     Quit date: 1/1/1995   • Smokeless tobacco: Never Used   • Alcohol use 0.0 oz/week      Comment: 1 glass occasionally   • Drug use: Yes     Types: Marijuana   • Sexual activity: Yes     Partners: Male     Other Topics Concern   • Not on file     Social History Narrative    2013. . Has sig other for many years.      Allergies   Allergen Reactions   • Amlodipine Swelling     LE edema   • Pcn [Penicillins] Rash     .   • Sulfa Drugs Rash     .   • Toprol Xl [Metoprolol]      Fatigue       Outpatient Encounter Prescriptions as of 4/4/2018   Medication Sig Dispense Refill   • spironolactone (ALDACTONE) 25 MG Tab Take 1 Tab by mouth every day. 30 Tab 3   • aspirin EC (ECOTRIN) 81 MG Tablet " Delayed Response Take 81 mg by mouth every day.     • lisinopril (PRINIVIL, ZESTRIL) 40 MG tablet Take 1 Tab by mouth every day. Needs to be seen for further refills. Thank you 30 Tab 0   • sertraline (ZOLOFT) 100 MG Tab TAKE 1 TABLET BY MOUTH DAILY. 90 Tab 3   • glipiZIDE (GLUCOTROL) 10 MG Tab Take 1 Tab by mouth 2 times a day. 180 Tab 3   • cyclobenzaprine (FLEXERIL) 10 MG Tab TAKE 1/2 TO 1 TABLET AT BEDTIME AS NEEDED FOR PAIN/SPASMS 30 Tab 3   • sitagliptin (JANUVIA) 100 MG Tab TAKE ONE TABLET BY MOUTH DAILY 90 Tab 3   • diltiazem CD (CARTIA XT) 300 MG CAPSULE SR 24 HR Take 1 Cap by mouth every day. 90 Cap 3   • Mometasone Furo-Formoterol Fum (DULERA) 100-5 MCG/ACT Aerosol Inhale 1 Puff by mouth 2 Times a Day. 3 Inhaler 3   • metformin ER (GLUCOPHAGE XR) 500 MG TABLET SR 24 HR TAKE FOUR TABLETS (2000MG)  BY MOUTH DAILY 360 Tab 2   • insulin glargine (LANTUS SOLOSTAR) 100 UNIT/ML Solution Pen-injector injection Inject 15 Units as instructed every evening. 15 mL 2   • Insulin Pen Needle 32G X 4 MM Misc Using one per day with Lantus injection 1 Each 2   • albuterol 108 (90 BASE) MCG/ACT Aero Soln inhalation aerosol Inhale 1-2 Puffs by mouth every 6 hours as needed for Shortness of Breath. 1 Inhaler 3   • Polyethyl Glycol-Propyl Glycol (SYSTANE FREE OP) by Ophthalmic route.     • glucose blood (ONE TOUCH ULTRA TEST) strip USE TO TEST BLOOD SUGAR DAILY 100 Strip 11   • CRESTOR 10 MG Tab TAKE 1 TABLET BY MOUTH DAILY. 30 Tab 5   • LIFESCAN FINEPOINT LANCETS MISC USE TO TEST BLOOD SUGAR DAILY 100 Each 11   • fluticasone (FLONASE) 50 MCG/ACT nasal spray USE ONE SPRAY IN EACH NOSTRIL DAILY 1 Bottle 5   • NON SPECIFIED by Other route. 1. Lancettes. Use daily #100 RF x 1 year  2. Glucometer test strips. Use daily #100 RF x 1 year.  Dx: 250.00 100 Each 1   • cetirizine (ZYRTEC) 10 MG TABS Take 10 mg by mouth every day.       • vitamin D (CHOLECALCIFEROL) 1000 UNIT TABS Take 1,000 Units by mouth every day.     • [DISCONTINUED]  "rosuvastatin (CRESTOR) 10 MG Tab TAKE ONE TABLET BY MOUTH DAILY 90 Tab 2     No facility-administered encounter medications on file as of 4/4/2018.      Review of Systems   Constitutional: Negative for chills, fever, malaise/fatigue and weight loss.   HENT: Negative for ear discharge, ear pain, hearing loss and nosebleeds.    Eyes: Negative for blurred vision, double vision, pain and discharge.   Respiratory: Negative for cough and shortness of breath.    Cardiovascular: Negative for chest pain, palpitations, orthopnea, claudication, leg swelling and PND.   Gastrointestinal: Negative for abdominal pain, blood in stool, melena, nausea and vomiting.   Genitourinary: Negative for dysuria and hematuria.   Musculoskeletal: Negative for falls, joint pain and myalgias.   Skin: Negative for itching and rash.   Neurological: Negative for dizziness, sensory change, speech change, loss of consciousness and headaches.   Endo/Heme/Allergies: Negative for environmental allergies. Does not bruise/bleed easily.   Psychiatric/Behavioral: Negative for depression, hallucinations and suicidal ideas.        Objective:   /70   Pulse 72   Ht 1.549 m (5' 1\")   Wt 61.2 kg (135 lb)   LMP 01/01/2000   SpO2 94%   BMI 25.51 kg/m²      Physical Exam   Constitutional: She is oriented to person, place, and time. No distress.   HENT:   Head: Normocephalic and atraumatic.   Right Ear: External ear normal.   Left Ear: External ear normal.   Eyes: Right eye exhibits no discharge. Left eye exhibits no discharge.   Neck: No JVD present. No thyromegaly present.   Cardiovascular: Normal rate, regular rhythm, normal heart sounds and intact distal pulses.  Exam reveals no gallop and no friction rub.    No murmur heard.  Pulmonary/Chest: Breath sounds normal. No respiratory distress.   Abdominal: Bowel sounds are normal. She exhibits no distension. There is no tenderness.   Musculoskeletal: She exhibits no edema or tenderness.   Neurological: She " is alert and oriented to person, place, and time. No cranial nerve deficit.   Skin: Skin is warm and dry. She is not diaphoretic.   Psychiatric: She has a normal mood and affect. Her behavior is normal.   Nursing note and vitals reviewed.      Assessment:     1. Essential hypertension  spironolactone (ALDACTONE) 25 MG Tab    BASIC METABOLIC PANEL   2. Family history of early CAD     3. Chronic obstructive pulmonary disease, unspecified COPD type (CMS-HCC)     4. Preventative health care     5. Type 2 diabetes mellitus with hyperglycemia, with long-term current use of insulin (CMS-HCC)     6. High risk medication use         Medical Decision Making:  Today's Assessment / Status / Plan:   Will start spironolactone 25 mg po daily.  Continue Cardizem 300 mg po daily and Lisinopril 40 mg po daily.  Continue Rosuvastatin 10 mg po daily.    I will see patient back in clinic with lab tests and studies results in 2 months.    I thank you for referring patient to our Cardiology Clinic today.        Kenia Parr M.D.  [address] [address]  VIA In Basket

## 2018-04-04 NOTE — PROGRESS NOTES
"Chief Complaint   Patient presents with   • HTN (Uncontrolled)       Subjective:   Kenia Oconnell is a 71 y.o. female who presents today for cardiac care of CAD, HTN, HLP, DM.  In the interim, patient has been doing well without having any symptoms. Patient denies having chest pain, dyspnea, palpitation, presyncope, syncope episodes. Able to climb up at least 2 flights of stairs.    LDL is within goal.   Renal function and Liver function are adequate.    Blood pressure is elevated.    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   • Cataract     bilat   • Cervical High Risk HPV Test Positive 10/18/2006   • COPD (chronic obstructive pulmonary disease) (CMS-HCC) 6/11/2009   • DDD (degenerative disc disease), lumbar 8/14/2013   • Depression 6/11/2009   • Emphysema of lung (CMS-HCC)    • Glaucoma    • Glaucoma suspect 6/11/2009   • HDL deficiency 3/30/2012   • Heart burn    • History of Gout when on HCTZ 6/11/2009   • Hyperlipidemia LDL goal < 100 6/11/2009   • Hyperplastic colon polyp 12/7/2007   • Hypertension    • Hypertriglyceridemia 3/30/2012   • Personal history of allergy to eggs 4/2/2010   • Posterior vitreous detachment of right eye 7/6/2015   • Seizure (CMS-HCC) 1953    \"sun stroke\"   • Type II or unspecified type diabetes mellitus without mention of complication, not stated as uncontrolled     oral meds and insulin   • Vitamin d deficiency 4/2/2010     Past Surgical History:   Procedure Laterality Date   • CATARACT PHACO WITH IOL Right 11/7/2017    Procedure: CATARACT PHACO WITH IOL;  Surgeon: Carlos Arce M.D.;  Location: SURGERY SAME DAY [facility];  Service: Ophthalmology   • CATARACT PHACO WITH IOL Left 10/17/2017    Procedure: CATARACT PHACO WITH IOL;  Surgeon: Carlos Arce M.D.;  Location: SURGERY SAME DAY " [facility];  Service: Ophthalmology   • EYE SURGERY Bilateral 2013    Dr. Garrido   • SINUSOTOMIES  1999   • EYE SURGERY  1997    \"laser for eye pressure\"   • DILATION AND CURETTAGE  1981   • TONSILLECTOMY  1953     Family History   Problem Relation Age of Onset   • Heart Disease Father      d. MI 50s   • Hypertension Father    • Heart Attack Father    • Lung Disease Mother      d. lung dz   • Hypertension Mother    • Cancer Paternal Grandfather      Black Lung   • Diabetes Paternal Grandmother    • Heart Disease Sister 59     mi and CVA -    • Stroke Sister    • Heart Attack Brother    • Diabetes Other      Paternal Cousin DM1     Social History     Social History   • Marital status:      Spouse name: N/A   • Number of children: 1   • Years of education: N/A     Occupational History   • Not on file.     Social History Main Topics   • Smoking status: Former Smoker     Packs/day: 1.00     Years: 29.00     Types: Cigarettes     Start date: 1/1/1966     Quit date: 1/1/1995   • Smokeless tobacco: Never Used   • Alcohol use 0.0 oz/week      Comment: 1 glass occasionally   • Drug use: Yes     Types: Marijuana   • Sexual activity: Yes     Partners: Male     Other Topics Concern   • Not on file     Social History Narrative    2013. . Has sig other for many years.      Allergies   Allergen Reactions   • Amlodipine Swelling     LE edema   • Pcn [Penicillins] Rash     .   • Sulfa Drugs Rash     .   • Toprol Xl [Metoprolol]      Fatigue       Outpatient Encounter Prescriptions as of 4/4/2018   Medication Sig Dispense Refill   • spironolactone (ALDACTONE) 25 MG Tab Take 1 Tab by mouth every day. 30 Tab 3   • aspirin EC (ECOTRIN) 81 MG Tablet Delayed Response Take 81 mg by mouth every day.     • lisinopril (PRINIVIL, ZESTRIL) 40 MG tablet Take 1 Tab by mouth every day. Needs to be seen for further refills. Thank you 30 Tab 0   • sertraline (ZOLOFT) 100 MG Tab TAKE 1 TABLET BY MOUTH DAILY. 90 Tab 3   • glipiZIDE " (GLUCOTROL) 10 MG Tab Take 1 Tab by mouth 2 times a day. 180 Tab 3   • cyclobenzaprine (FLEXERIL) 10 MG Tab TAKE 1/2 TO 1 TABLET AT BEDTIME AS NEEDED FOR PAIN/SPASMS 30 Tab 3   • sitagliptin (JANUVIA) 100 MG Tab TAKE ONE TABLET BY MOUTH DAILY 90 Tab 3   • diltiazem CD (CARTIA XT) 300 MG CAPSULE SR 24 HR Take 1 Cap by mouth every day. 90 Cap 3   • Mometasone Furo-Formoterol Fum (DULERA) 100-5 MCG/ACT Aerosol Inhale 1 Puff by mouth 2 Times a Day. 3 Inhaler 3   • metformin ER (GLUCOPHAGE XR) 500 MG TABLET SR 24 HR TAKE FOUR TABLETS (2000MG)  BY MOUTH DAILY 360 Tab 2   • insulin glargine (LANTUS SOLOSTAR) 100 UNIT/ML Solution Pen-injector injection Inject 15 Units as instructed every evening. 15 mL 2   • Insulin Pen Needle 32G X 4 MM Misc Using one per day with Lantus injection 1 Each 2   • albuterol 108 (90 BASE) MCG/ACT Aero Soln inhalation aerosol Inhale 1-2 Puffs by mouth every 6 hours as needed for Shortness of Breath. 1 Inhaler 3   • Polyethyl Glycol-Propyl Glycol (SYSTANE FREE OP) by Ophthalmic route.     • glucose blood (ONE TOUCH ULTRA TEST) strip USE TO TEST BLOOD SUGAR DAILY 100 Strip 11   • CRESTOR 10 MG Tab TAKE 1 TABLET BY MOUTH DAILY. 30 Tab 5   • LIFESCAN FINEPOINT LANCETS MISC USE TO TEST BLOOD SUGAR DAILY 100 Each 11   • fluticasone (FLONASE) 50 MCG/ACT nasal spray USE ONE SPRAY IN EACH NOSTRIL DAILY 1 Bottle 5   • NON SPECIFIED by Other route. 1. Lancettes. Use daily #100 RF x 1 year  2. Glucometer test strips. Use daily #100 RF x 1 year.  Dx: 250.00 100 Each 1   • cetirizine (ZYRTEC) 10 MG TABS Take 10 mg by mouth every day.       • vitamin D (CHOLECALCIFEROL) 1000 UNIT TABS Take 1,000 Units by mouth every day.     • [DISCONTINUED] rosuvastatin (CRESTOR) 10 MG Tab TAKE ONE TABLET BY MOUTH DAILY 90 Tab 2     No facility-administered encounter medications on file as of 4/4/2018.      Review of Systems   Constitutional: Negative for chills, fever, malaise/fatigue and weight loss.   HENT: Negative for  "ear discharge, ear pain, hearing loss and nosebleeds.    Eyes: Negative for blurred vision, double vision, pain and discharge.   Respiratory: Negative for cough and shortness of breath.    Cardiovascular: Negative for chest pain, palpitations, orthopnea, claudication, leg swelling and PND.   Gastrointestinal: Negative for abdominal pain, blood in stool, melena, nausea and vomiting.   Genitourinary: Negative for dysuria and hematuria.   Musculoskeletal: Negative for falls, joint pain and myalgias.   Skin: Negative for itching and rash.   Neurological: Negative for dizziness, sensory change, speech change, loss of consciousness and headaches.   Endo/Heme/Allergies: Negative for environmental allergies. Does not bruise/bleed easily.   Psychiatric/Behavioral: Negative for depression, hallucinations and suicidal ideas.        Objective:   /70   Pulse 72   Ht 1.549 m (5' 1\")   Wt 61.2 kg (135 lb)   LMP 01/01/2000   SpO2 94%   BMI 25.51 kg/m²     Physical Exam   Constitutional: She is oriented to person, place, and time. No distress.   HENT:   Head: Normocephalic and atraumatic.   Right Ear: External ear normal.   Left Ear: External ear normal.   Eyes: Right eye exhibits no discharge. Left eye exhibits no discharge.   Neck: No JVD present. No thyromegaly present.   Cardiovascular: Normal rate, regular rhythm, normal heart sounds and intact distal pulses.  Exam reveals no gallop and no friction rub.    No murmur heard.  Pulmonary/Chest: Breath sounds normal. No respiratory distress.   Abdominal: Bowel sounds are normal. She exhibits no distension. There is no tenderness.   Musculoskeletal: She exhibits no edema or tenderness.   Neurological: She is alert and oriented to person, place, and time. No cranial nerve deficit.   Skin: Skin is warm and dry. She is not diaphoretic.   Psychiatric: She has a normal mood and affect. Her behavior is normal.   Nursing note and vitals reviewed.      Assessment:     1. " Essential hypertension  spironolactone (ALDACTONE) 25 MG Tab    BASIC METABOLIC PANEL   2. Family history of early CAD     3. Chronic obstructive pulmonary disease, unspecified COPD type (CMS-HCC)     4. Preventative health care     5. Type 2 diabetes mellitus with hyperglycemia, with long-term current use of insulin (CMS-HCC)     6. High risk medication use         Medical Decision Making:  Today's Assessment / Status / Plan:   Will start spironolactone 25 mg po daily.  Continue Cardizem 300 mg po daily and Lisinopril 40 mg po daily.  Continue Rosuvastatin 10 mg po daily.    I will see patient back in clinic with lab tests and studies results in 2 months.    I thank you for referring patient to our Cardiology Clinic today.

## 2018-04-06 DIAGNOSIS — I10 ESSENTIAL HYPERTENSION: ICD-10-CM

## 2018-04-06 RX ORDER — LISINOPRIL 40 MG/1
40 TABLET ORAL DAILY
Qty: 30 TAB | Refills: 11 | Status: SHIPPED | OUTPATIENT
Start: 2018-04-06 | End: 2019-03-08 | Stop reason: SDUPTHER

## 2018-04-07 ENCOUNTER — HOSPITAL ENCOUNTER (OUTPATIENT)
Dept: RADIOLOGY | Facility: MEDICAL CENTER | Age: 73
End: 2018-04-07
Attending: FAMILY MEDICINE
Payer: MEDICARE

## 2018-04-07 DIAGNOSIS — K76.0 FATTY LIVER: ICD-10-CM

## 2018-04-07 PROCEDURE — 76705 ECHO EXAM OF ABDOMEN: CPT

## 2018-04-10 NOTE — TELEPHONE ENCOUNTER
Was the patient seen in the last year in this department? Yes     Does patient have an active prescription for medications requested? No     Received Request Via: Pharmacy     Last visit:3/13/18

## 2018-04-11 RX ORDER — ROSUVASTATIN CALCIUM 10 MG/1
TABLET, COATED ORAL
Qty: 90 TAB | Refills: 3 | Status: SHIPPED | OUTPATIENT
Start: 2018-04-11 | End: 2019-03-08 | Stop reason: SDUPTHER

## 2018-04-24 DIAGNOSIS — E11.69 DM TYPE 2 WITH DIABETIC DYSLIPIDEMIA (HCC): ICD-10-CM

## 2018-04-24 DIAGNOSIS — E78.5 DM TYPE 2 WITH DIABETIC DYSLIPIDEMIA (HCC): ICD-10-CM

## 2018-05-07 ENCOUNTER — HOSPITAL ENCOUNTER (OUTPATIENT)
Dept: LAB | Facility: MEDICAL CENTER | Age: 73
End: 2018-05-07
Attending: FAMILY MEDICINE
Payer: MEDICARE

## 2018-05-07 ENCOUNTER — HOSPITAL ENCOUNTER (OUTPATIENT)
Dept: LAB | Facility: MEDICAL CENTER | Age: 73
End: 2018-05-07
Attending: INTERNAL MEDICINE
Payer: MEDICARE

## 2018-05-07 DIAGNOSIS — E55.9 VITAMIN D INSUFFICIENCY: ICD-10-CM

## 2018-05-07 DIAGNOSIS — I10 ESSENTIAL HYPERTENSION: ICD-10-CM

## 2018-05-07 LAB
25(OH)D3 SERPL-MCNC: 17 NG/ML (ref 30–100)
ANION GAP SERPL CALC-SCNC: 9 MMOL/L (ref 0–11.9)
BUN SERPL-MCNC: 15 MG/DL (ref 8–22)
CALCIUM SERPL-MCNC: 9.7 MG/DL (ref 8.5–10.5)
CHLORIDE SERPL-SCNC: 104 MMOL/L (ref 96–112)
CO2 SERPL-SCNC: 24 MMOL/L (ref 20–33)
CREAT SERPL-MCNC: 1.02 MG/DL (ref 0.5–1.4)
GLUCOSE SERPL-MCNC: 224 MG/DL (ref 65–99)
POTASSIUM SERPL-SCNC: 4.3 MMOL/L (ref 3.6–5.5)
SODIUM SERPL-SCNC: 137 MMOL/L (ref 135–145)
T4 FREE SERPL-MCNC: 0.67 NG/DL (ref 0.53–1.43)
TSH SERPL DL<=0.005 MIU/L-ACNC: 5.07 UIU/ML (ref 0.38–5.33)

## 2018-05-07 PROCEDURE — 82306 VITAMIN D 25 HYDROXY: CPT

## 2018-05-07 PROCEDURE — 36415 COLL VENOUS BLD VENIPUNCTURE: CPT

## 2018-05-07 PROCEDURE — 84439 ASSAY OF FREE THYROXINE: CPT

## 2018-05-07 PROCEDURE — 84443 ASSAY THYROID STIM HORMONE: CPT

## 2018-05-07 PROCEDURE — 80048 BASIC METABOLIC PNL TOTAL CA: CPT

## 2018-05-09 RX ORDER — METFORMIN HYDROCHLORIDE 500 MG/1
TABLET, EXTENDED RELEASE ORAL
Qty: 360 TAB | Refills: 3 | Status: SHIPPED | OUTPATIENT
Start: 2018-05-09 | End: 2019-07-22 | Stop reason: SDUPTHER

## 2018-05-11 ENCOUNTER — OFFICE VISIT (OUTPATIENT)
Dept: CARDIOLOGY | Facility: MEDICAL CENTER | Age: 73
End: 2018-05-11
Payer: MEDICARE

## 2018-05-11 VITALS
DIASTOLIC BLOOD PRESSURE: 62 MMHG | BODY MASS INDEX: 25.3 KG/M2 | SYSTOLIC BLOOD PRESSURE: 112 MMHG | HEART RATE: 88 BPM | WEIGHT: 134 LBS | OXYGEN SATURATION: 90 % | HEIGHT: 61 IN

## 2018-05-11 DIAGNOSIS — Z79.4 TYPE 2 DIABETES MELLITUS WITH HYPERGLYCEMIA, WITH LONG-TERM CURRENT USE OF INSULIN (HCC): ICD-10-CM

## 2018-05-11 DIAGNOSIS — I10 HTN (HYPERTENSION), MALIGNANT: ICD-10-CM

## 2018-05-11 DIAGNOSIS — Z00.00 PREVENTATIVE HEALTH CARE: ICD-10-CM

## 2018-05-11 DIAGNOSIS — Z82.49 FAMILY HISTORY OF EARLY CAD: ICD-10-CM

## 2018-05-11 DIAGNOSIS — Z79.899 HIGH RISK MEDICATION USE: ICD-10-CM

## 2018-05-11 DIAGNOSIS — E11.65 TYPE 2 DIABETES MELLITUS WITH HYPERGLYCEMIA, WITH LONG-TERM CURRENT USE OF INSULIN (HCC): ICD-10-CM

## 2018-05-11 DIAGNOSIS — J44.9 CHRONIC OBSTRUCTIVE PULMONARY DISEASE, UNSPECIFIED COPD TYPE (HCC): ICD-10-CM

## 2018-05-11 PROCEDURE — 99214 OFFICE O/P EST MOD 30 MIN: CPT | Performed by: INTERNAL MEDICINE

## 2018-05-11 RX ORDER — SPIRONOLACTONE 25 MG/1
25 TABLET ORAL DAILY
Qty: 30 TAB | Refills: 11 | Status: SHIPPED | OUTPATIENT
Start: 2018-05-11 | End: 2019-03-08 | Stop reason: SDUPTHER

## 2018-05-11 ASSESSMENT — ENCOUNTER SYMPTOMS
VOMITING: 0
CHILLS: 0
DEPRESSION: 0
FALLS: 0
PND: 0
DIZZINESS: 0
PALPITATIONS: 0
MYALGIAS: 0
COUGH: 0
SENSORY CHANGE: 0
DOUBLE VISION: 0
BLURRED VISION: 0
SHORTNESS OF BREATH: 0
HALLUCINATIONS: 0
BLOOD IN STOOL: 0
CLAUDICATION: 0
LOSS OF CONSCIOUSNESS: 0
NAUSEA: 0
HEADACHES: 0
ABDOMINAL PAIN: 0
FEVER: 0
BRUISES/BLEEDS EASILY: 0
SPEECH CHANGE: 0
EYE DISCHARGE: 0
WEIGHT LOSS: 0
EYE PAIN: 0
ORTHOPNEA: 0

## 2018-05-11 NOTE — PROGRESS NOTES
"Chief Complaint   Patient presents with   • Hypertension       Subjective:   Kenia Oconnell is a 72 y.o. female who presents today for cardiac care of CAD, HTN, HLP, DM.  In the interim, patient has been doing well without having any symptoms. Patient denies having chest pain, dyspnea, palpitation, presyncope, syncope episodes. Able to climb up at least 2 flights of stairs.     LDL is within goal.   Renal function and Liver function are adequate.    Blood pressure is well controlled.    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   • Cataract     bilat   • Cervical High Risk HPV Test Positive 10/18/2006   • COPD (chronic obstructive pulmonary disease) ([facility]) 6/11/2009   • DDD (degenerative disc disease), lumbar 8/14/2013   • Depression 6/11/2009   • Emphysema of lung ([facility])    • Glaucoma    • Glaucoma suspect 6/11/2009   • HDL deficiency 3/30/2012   • Heart burn    • History of Gout when on HCTZ 6/11/2009   • Hyperlipidemia LDL goal < 100 6/11/2009   • Hyperplastic colon polyp 12/7/2007   • Hypertension    • Hypertriglyceridemia 3/30/2012   • Personal history of allergy to eggs 4/2/2010   • Posterior vitreous detachment of right eye 7/6/2015   • Seizure ([facility]) 1953    \"sun stroke\"   • Type II or unspecified type diabetes mellitus without mention of complication, not stated as uncontrolled     oral meds and insulin   • Vitamin d deficiency 4/2/2010     Past Surgical History:   Procedure Laterality Date   • CATARACT PHACO WITH IOL Right 11/7/2017    Procedure: CATARACT PHACO WITH IOL;  Surgeon: Carlos Arce M.D.;  Location: SURGERY SAME DAY [facility];  Service: Ophthalmology   • CATARACT PHACO WITH IOL Left 10/17/2017    Procedure: CATARACT PHACO WITH IOL;  Surgeon: Carlos Arce M.D.;  Location: SURGERY SAME DAY [facility]" [facility];  Service: Ophthalmology   • EYE SURGERY Bilateral 2013    Dr. Garrido   • SINUSOTOMIES  1999   • EYE SURGERY  1997    \"laser for eye pressure\"   • DILATION AND CURETTAGE  1981   • TONSILLECTOMY  1953     Family History   Problem Relation Age of Onset   • Heart Disease Father      d. MI 50s   • Hypertension Father    • Heart Attack Father    • Lung Disease Mother      d. lung dz   • Hypertension Mother    • Cancer Paternal Grandfather      Black Lung   • Diabetes Paternal Grandmother    • Heart Disease Sister 59     mi and CVA -    • Stroke Sister    • Heart Attack Brother    • Diabetes Other      Paternal Cousin DM1     Social History     Social History   • Marital status:      Spouse name: N/A   • Number of children: 1   • Years of education: N/A     Occupational History   • Not on file.     Social History Main Topics   • Smoking status: Former Smoker     Packs/day: 1.00     Years: 29.00     Types: Cigarettes     Start date: 1/1/1966     Quit date: 1/1/1995   • Smokeless tobacco: Never Used   • Alcohol use 0.0 oz/week      Comment: 1 glass occasionally   • Drug use: Yes     Types: Marijuana   • Sexual activity: Yes     Partners: Male     Other Topics Concern   • Not on file     Social History Narrative    2013. . Has sig other for many years.      Allergies   Allergen Reactions   • Amlodipine Swelling     LE edema   • Pcn [Penicillins] Rash     .   • Sulfa Drugs Rash     .   • Toprol Xl [Metoprolol]      Fatigue       Outpatient Encounter Prescriptions as of 5/11/2018   Medication Sig Dispense Refill   • spironolactone (ALDACTONE) 25 MG Tab Take 1 Tab by mouth every day. 30 Tab 11   • metFORMIN ER (GLUCOPHAGE XR) 500 MG TABLET SR 24 HR TAKE 4 TABLETS BY MOUTH EVERY DAY.  360 Tab 3   • lisinopril (PRINIVIL, ZESTRIL) 40 MG tablet Take 1 Tab by mouth every day. 30 Tab 11   • aspirin EC (ECOTRIN) 81 MG Tablet Delayed Response Take 81 mg by mouth every day.     • sertraline (ZOLOFT) 100 MG Tab TAKE 1 " TABLET BY MOUTH DAILY. 90 Tab 3   • glipiZIDE (GLUCOTROL) 10 MG Tab Take 1 Tab by mouth 2 times a day. 180 Tab 3   • cyclobenzaprine (FLEXERIL) 10 MG Tab TAKE 1/2 TO 1 TABLET AT BEDTIME AS NEEDED FOR PAIN/SPASMS 30 Tab 3   • sitagliptin (JANUVIA) 100 MG Tab TAKE ONE TABLET BY MOUTH DAILY 90 Tab 3   • diltiazem CD (CARTIA XT) 300 MG CAPSULE SR 24 HR Take 1 Cap by mouth every day. 90 Cap 3   • Mometasone Furo-Formoterol Fum (DULERA) 100-5 MCG/ACT Aerosol Inhale 1 Puff by mouth 2 Times a Day. 3 Inhaler 3   • insulin glargine (LANTUS SOLOSTAR) 100 UNIT/ML Solution Pen-injector injection Inject 15 Units as instructed every evening. 15 mL 2   • albuterol 108 (90 BASE) MCG/ACT Aero Soln inhalation aerosol Inhale 1-2 Puffs by mouth every 6 hours as needed for Shortness of Breath. 1 Inhaler 3   • Polyethyl Glycol-Propyl Glycol (SYSTANE FREE OP) by Ophthalmic route.     • CRESTOR 10 MG Tab TAKE 1 TABLET BY MOUTH DAILY. 30 Tab 5   • fluticasone (FLONASE) 50 MCG/ACT nasal spray USE ONE SPRAY IN EACH NOSTRIL DAILY 1 Bottle 5   • cetirizine (ZYRTEC) 10 MG TABS Take 10 mg by mouth every day.       • vitamin D (CHOLECALCIFEROL) 1000 UNIT TABS Take 1,000 Units by mouth every day.     • Insulin Pen Needle 32G X 4 MM Misc Using one per day with Lantus injection 90 Each 3   • rosuvastatin (CRESTOR) 10 MG Tab TAKE ONE TABLET BY MOUTH DAILY 90 Tab 3   • [DISCONTINUED] spironolactone (ALDACTONE) 25 MG Tab Take 1 Tab by mouth every day. 30 Tab 3   • glucose blood (ONE TOUCH ULTRA TEST) strip USE TO TEST BLOOD SUGAR DAILY 100 Strip 11   • LIFESCAN FINEPOINT LANCETS MISC USE TO TEST BLOOD SUGAR DAILY 100 Each 11   • NON SPECIFIED by Other route. 1. Lancettes. Use daily #100 RF x 1 year  2. Glucometer test strips. Use daily #100 RF x 1 year.  Dx: 250.00 100 Each 1     No facility-administered encounter medications on file as of 5/11/2018.      Review of Systems   Constitutional: Negative for chills, fever, malaise/fatigue and weight loss.  "  HENT: Negative for ear discharge, ear pain, hearing loss and nosebleeds.    Eyes: Negative for blurred vision, double vision, pain and discharge.   Respiratory: Negative for cough and shortness of breath.    Cardiovascular: Negative for chest pain, palpitations, orthopnea, claudication, leg swelling and PND.   Gastrointestinal: Negative for abdominal pain, blood in stool, melena, nausea and vomiting.   Genitourinary: Negative for dysuria and hematuria.   Musculoskeletal: Negative for falls, joint pain and myalgias.   Skin: Negative for itching and rash.   Neurological: Negative for dizziness, sensory change, speech change, loss of consciousness and headaches.   Endo/Heme/Allergies: Negative for environmental allergies. Does not bruise/bleed easily.   Psychiatric/Behavioral: Negative for depression, hallucinations and suicidal ideas.        Objective:   /62   Pulse 88   Ht 1.549 m (5' 1\")   Wt 60.8 kg (134 lb)   LMP 01/01/2000   SpO2 90%   BMI 25.32 kg/m²     Physical Exam   Constitutional: She is oriented to person, place, and time. No distress.   HENT:   Head: Normocephalic and atraumatic.   Right Ear: External ear normal.   Left Ear: External ear normal.   Eyes: Right eye exhibits no discharge. Left eye exhibits no discharge.   Neck: No JVD present. No thyromegaly present.   Cardiovascular: Normal rate, regular rhythm, normal heart sounds and intact distal pulses.  Exam reveals no gallop and no friction rub.    No murmur heard.  Pulmonary/Chest: Breath sounds normal. No respiratory distress.   Abdominal: Bowel sounds are normal. She exhibits no distension. There is no tenderness.   Musculoskeletal: She exhibits no edema or tenderness.   Neurological: She is alert and oriented to person, place, and time. No cranial nerve deficit.   Skin: Skin is warm and dry. She is not diaphoretic.   Psychiatric: She has a normal mood and affect. Her behavior is normal.   Nursing note and vitals " reviewed.      Assessment:     1. HTN (hypertension), malignant  COMP METABOLIC PANEL    spironolactone (ALDACTONE) 25 MG Tab   2. Family history of early CAD  LIPID PANEL   3. Chronic obstructive pulmonary disease, unspecified COPD type (HCC)     4. Preventative health care     5. Type 2 diabetes mellitus with hyperglycemia, with long-term current use of insulin (HCC)     6. High risk medication use  COMP METABOLIC PANEL       Medical Decision Making:  Today's Assessment / Status / Plan:   Tolerating Spironolactone well.  Cont current medications at current dose.     I will see patient back in clinic with lab tests and studies results in 6 months.    I thank you for referring patient to our Cardiology Clinic today.

## 2018-05-11 NOTE — LETTER
"     [facility]   [address], [address]  [place] [address]  Phone: [phone]  Fax: [phone]              Kenia Oconnell  1945    Encounter Date: 5/11/2018    Elyse Flores M.D.          PROGRESS NOTE:  Chief Complaint   Patient presents with   • Hypertension       Subjective:   Kenia Oconnell is a 72 y.o. female who presents today for cardiac care of CAD, HTN, HLP, DM.  In the interim, patient has been doing well without having any symptoms. Patient denies having chest pain, dyspnea, palpitation, presyncope, syncope episodes. Able to climb up at least 2 flights of stairs.     LDL is within goal.   Renal function and Liver function are adequate.    Blood pressure is well controlled.    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   • Cataract     bilat   • Cervical High Risk HPV Test Positive 10/18/2006   • COPD (chronic obstructive pulmonary disease) ([facility]) 6/11/2009   • DDD (degenerative disc disease), lumbar 8/14/2013   • Depression 6/11/2009   • Emphysema of lung ([facility])    • Glaucoma    • Glaucoma suspect 6/11/2009   • HDL deficiency 3/30/2012   • Heart burn    • History of Gout when on HCTZ 6/11/2009   • Hyperlipidemia LDL goal < 100 6/11/2009   • Hyperplastic colon polyp 12/7/2007   • Hypertension    • Hypertriglyceridemia 3/30/2012   • Personal history of allergy to eggs 4/2/2010   • Posterior vitreous detachment of right eye 7/6/2015   • Seizure (HCC) 1953    \"sun stroke\"   • Type II or unspecified type diabetes mellitus without mention of complication, not stated as uncontrolled     oral meds and insulin   • Vitamin d deficiency 4/2/2010     Past Surgical History:   Procedure Laterality Date   • CATARACT PHACO WITH IOL Right 11/7/2017    Procedure: " "CATARACT PHACO WITH IOL;  Surgeon: Carlos Arce M.D.;  Location: SURGERY SAME DAY [facility] ORS;  Service: Ophthalmology   • CATARACT PHACO WITH IOL Left 10/17/2017    Procedure: CATARACT PHACO WITH IOL;  Surgeon: Carlos Arce M.D.;  Location: SURGERY SAME DAY [facility] ORS;  Service: Ophthalmology   • EYE SURGERY Bilateral 2013    Dr. Garrido   • SINUSOTOMIES  1999   • EYE SURGERY  1997    \"laser for eye pressure\"   • DILATION AND CURETTAGE  1981   • TONSILLECTOMY  1953     Family History   Problem Relation Age of Onset   • Heart Disease Father      d. MI 50s   • Hypertension Father    • Heart Attack Father    • Lung Disease Mother      d. lung dz   • Hypertension Mother    • Cancer Paternal Grandfather      Black Lung   • Diabetes Paternal Grandmother    • Heart Disease Sister 59     mi and CVA -    • Stroke Sister    • Heart Attack Brother    • Diabetes Other      Paternal Cousin DM1     Social History     Social History   • Marital status:      Spouse name: N/A   • Number of children: 1   • Years of education: N/A     Occupational History   • Not on file.     Social History Main Topics   • Smoking status: Former Smoker     Packs/day: 1.00     Years: 29.00     Types: Cigarettes     Start date: 1/1/1966     Quit date: 1/1/1995   • Smokeless tobacco: Never Used   • Alcohol use 0.0 oz/week      Comment: 1 glass occasionally   • Drug use: Yes     Types: Marijuana   • Sexual activity: Yes     Partners: Male     Other Topics Concern   • Not on file     Social History Narrative    2013. . Has sig other for many years.      Allergies   Allergen Reactions   • Amlodipine Swelling     LE edema   • Pcn [Penicillins] Rash     .   • Sulfa Drugs Rash     .   • Toprol Xl [Metoprolol]      Fatigue       Outpatient Encounter Prescriptions as of 5/11/2018   Medication Sig Dispense Refill   • spironolactone (ALDACTONE) 25 MG Tab Take 1 Tab by mouth every day. 30 Tab 11   • metFORMIN ER (GLUCOPHAGE XR) 500 MG TABLET " SR 24 HR TAKE 4 TABLETS BY MOUTH EVERY DAY.  360 Tab 3   • lisinopril (PRINIVIL, ZESTRIL) 40 MG tablet Take 1 Tab by mouth every day. 30 Tab 11   • aspirin EC (ECOTRIN) 81 MG Tablet Delayed Response Take 81 mg by mouth every day.     • sertraline (ZOLOFT) 100 MG Tab TAKE 1 TABLET BY MOUTH DAILY. 90 Tab 3   • glipiZIDE (GLUCOTROL) 10 MG Tab Take 1 Tab by mouth 2 times a day. 180 Tab 3   • cyclobenzaprine (FLEXERIL) 10 MG Tab TAKE 1/2 TO 1 TABLET AT BEDTIME AS NEEDED FOR PAIN/SPASMS 30 Tab 3   • sitagliptin (JANUVIA) 100 MG Tab TAKE ONE TABLET BY MOUTH DAILY 90 Tab 3   • diltiazem CD (CARTIA XT) 300 MG CAPSULE SR 24 HR Take 1 Cap by mouth every day. 90 Cap 3   • Mometasone Furo-Formoterol Fum (DULERA) 100-5 MCG/ACT Aerosol Inhale 1 Puff by mouth 2 Times a Day. 3 Inhaler 3   • insulin glargine (LANTUS SOLOSTAR) 100 UNIT/ML Solution Pen-injector injection Inject 15 Units as instructed every evening. 15 mL 2   • albuterol 108 (90 BASE) MCG/ACT Aero Soln inhalation aerosol Inhale 1-2 Puffs by mouth every 6 hours as needed for Shortness of Breath. 1 Inhaler 3   • Polyethyl Glycol-Propyl Glycol (SYSTANE FREE OP) by Ophthalmic route.     • CRESTOR 10 MG Tab TAKE 1 TABLET BY MOUTH DAILY. 30 Tab 5   • fluticasone (FLONASE) 50 MCG/ACT nasal spray USE ONE SPRAY IN EACH NOSTRIL DAILY 1 Bottle 5   • cetirizine (ZYRTEC) 10 MG TABS Take 10 mg by mouth every day.       • vitamin D (CHOLECALCIFEROL) 1000 UNIT TABS Take 1,000 Units by mouth every day.     • Insulin Pen Needle 32G X 4 MM Misc Using one per day with Lantus injection 90 Each 3   • rosuvastatin (CRESTOR) 10 MG Tab TAKE ONE TABLET BY MOUTH DAILY 90 Tab 3   • [DISCONTINUED] spironolactone (ALDACTONE) 25 MG Tab Take 1 Tab by mouth every day. 30 Tab 3   • glucose blood (ONE TOUCH ULTRA TEST) strip USE TO TEST BLOOD SUGAR DAILY 100 Strip 11   • [place] FINEPOINT LANCETS MISC USE TO TEST BLOOD SUGAR DAILY 100 Each 11   • NON SPECIFIED by Other route. 1. Lancettes. Use daily  "#100 RF x 1 year  2. Glucometer test strips. Use daily #100 RF x 1 year.  Dx: 250.00 100 Each 1     No facility-administered encounter medications on file as of 5/11/2018.      Review of Systems   Constitutional: Negative for chills, fever, malaise/fatigue and weight loss.   HENT: Negative for ear discharge, ear pain, hearing loss and nosebleeds.    Eyes: Negative for blurred vision, double vision, pain and discharge.   Respiratory: Negative for cough and shortness of breath.    Cardiovascular: Negative for chest pain, palpitations, orthopnea, claudication, leg swelling and PND.   Gastrointestinal: Negative for abdominal pain, blood in stool, melena, nausea and vomiting.   Genitourinary: Negative for dysuria and hematuria.   Musculoskeletal: Negative for falls, joint pain and myalgias.   Skin: Negative for itching and rash.   Neurological: Negative for dizziness, sensory change, speech change, loss of consciousness and headaches.   Endo/Heme/Allergies: Negative for environmental allergies. Does not bruise/bleed easily.   Psychiatric/Behavioral: Negative for depression, hallucinations and suicidal ideas.        Objective:   /62   Pulse 88   Ht 1.549 m (5' 1\")   Wt 60.8 kg (134 lb)   LMP 01/01/2000   SpO2 90%   BMI 25.32 kg/m²      Physical Exam   Constitutional: She is oriented to person, place, and time. No distress.   HENT:   Head: Normocephalic and atraumatic.   Right Ear: External ear normal.   Left Ear: External ear normal.   Eyes: Right eye exhibits no discharge. Left eye exhibits no discharge.   Neck: No JVD present. No thyromegaly present.   Cardiovascular: Normal rate, regular rhythm, normal heart sounds and intact distal pulses.  Exam reveals no gallop and no friction rub.    No murmur heard.  Pulmonary/Chest: Breath sounds normal. No respiratory distress.   Abdominal: Bowel sounds are normal. She exhibits no distension. There is no tenderness.   Musculoskeletal: She exhibits no edema or " tenderness.   Neurological: She is alert and oriented to person, place, and time. No cranial nerve deficit.   Skin: Skin is warm and dry. She is not diaphoretic.   Psychiatric: She has a normal mood and affect. Her behavior is normal.   Nursing note and vitals reviewed.      Assessment:     1. HTN (hypertension), malignant  COMP METABOLIC PANEL    spironolactone (ALDACTONE) 25 MG Tab   2. Family history of early CAD  LIPID PANEL   3. Chronic obstructive pulmonary disease, unspecified COPD type ([facility])     4. Preventative health care     5. Type 2 diabetes mellitus with hyperglycemia, with long-term current use of insulin ([facility])     6. High risk medication use  COMP METABOLIC PANEL       Medical Decision Making:  Today's Assessment / Status / Plan:   Tolerating Spironolactone well.  Cont current medications at current dose.     I will see patient back in clinic with lab tests and studies results in 6 months.    I thank you for referring patient to our Cardiology Clinic today.        Kenia Parr M.D.  [id number] [address]  VIA In Basket

## 2018-07-05 ENCOUNTER — TELEPHONE (OUTPATIENT)
Dept: MEDICAL GROUP | Facility: LAB | Age: 73
End: 2018-07-05

## 2018-07-05 NOTE — TELEPHONE ENCOUNTER
PVP WITH OUTREACH  Future Appointments       Provider Department Center    7/12/2018 10:20 AM Kenia Parr M.D.; [facility]  [facility]     11/14/2018 1:45 PM Elyse Flores M.D. [facility]           ANNUAL WELLNESS VISIT PRE-VISIT PLANNING     1.  Immunizations were updated in Epic using WebIZ?: Epic matches WebIZ       •  WebIZ Recommendations: Patient is up to date on all vaccines       •  Is patient due for Tdap? NO       •  Is patient due for Shingles? NO     2.  Specialty Comments was updated with diagnosis information provided by [facility]: NO MDX previously completed on 3/13/18

## 2018-08-07 RX ORDER — FLUCONAZOLE 150 MG/1
150 TABLET ORAL DAILY
Qty: 5 TAB | Refills: 0 | Status: SHIPPED | OUTPATIENT
Start: 2018-08-07 | End: 2018-08-12

## 2018-08-22 DIAGNOSIS — E78.5 DM TYPE 2 WITH DIABETIC DYSLIPIDEMIA (HCC): ICD-10-CM

## 2018-08-22 DIAGNOSIS — E11.69 DM TYPE 2 WITH DIABETIC DYSLIPIDEMIA (HCC): ICD-10-CM

## 2018-09-13 DIAGNOSIS — E11.69 DM TYPE 2 WITH DIABETIC DYSLIPIDEMIA (HCC): ICD-10-CM

## 2018-09-13 DIAGNOSIS — E78.5 DM TYPE 2 WITH DIABETIC DYSLIPIDEMIA (HCC): ICD-10-CM

## 2018-09-28 ENCOUNTER — TELEPHONE (OUTPATIENT)
Dept: MEDICAL GROUP | Facility: LAB | Age: 73
End: 2018-09-28

## 2018-09-28 NOTE — TELEPHONE ENCOUNTER
ANNUAL WELLNESS VISIT PRE-VISIT PLANNING WITHOUT OUTREACH  Future Appointments       Provider Department Center    10/3/2018 10:20 AM Kenia Parr M.D.; [facility] [place]     11/14/2018 1:45 PM Elyse Flores M.D. [facility]           1.  Reviewed note from last office visit with PCP: YES    2.  If any orders were placed at last visit, do we have Results/Consult Notes?        •  Labs - Labs were not ordered at last office visit.       •  Imaging - Imaging ordered, completed and results are in chart.       •  Referrals - No referrals were ordered at last office visit.    3.  Immunizations were updated in Epic using WebIZ?: Epic matches WebIZ       •  WebIZ Recommendations: FLU and SHINGRIX (Shingles)        •  Is patient due for Tdap? NO       •  Is patient due for Shingles? YES. Patient was notified of copay/out of pocket cost.     4.  Patient is due for the following Health Maintenance Topics:   Health Maintenance Due   Topic Date Due   • IMM HEP B VACCINE (1 of 3 - Risk 3-dose series) 06/16/1966   • IMM ZOSTER VACCINES (2 of 3) 08/27/2010   • PFT SCREENING-FEV1 AND FEV/FVC RATIO / SPIROMETRY SHOULD BE PERFORMED ANNUALLY  05/19/2017   • MAMMOGRAM  05/12/2018   • BONE DENSITY  07/12/2018   • Annual Wellness Visit  07/20/2018   • IMM INFLUENZA (1) 09/01/2018   • A1C SCREENING  09/06/2018       - Patient has completed PNEUMOVAX (PPSV23), PREVNAR (PCV13)  and TDAP Immunization(s) per WebIZ. Chart has been updated.    5.  Reviewed/Updated the following with patient:       •   Preferred Pharmacy? YES       •   Preferred Lab? YES       •   Preferred Communication? YES       •   Allergies? YES       •   Medications? YES. Was Abstract Encounter opened and chart updated? YES       •   Social History? YES. Was Abstract Encounter opened and chart updated? YES       •   Family History (document living status of immediate family  members and if + hx of  cancer, diabetes, hypertension, hyperlipidemia, heart attack, stroke) YES. Was Abstract Encounter opened and chart updated? YES    6.  Care Team Updated:       •   DME Company (gait device, O2, CPAP, etc.): YES       •   Other Specialists (eye doctor, derm, GYN, cardiology, endo, etc): YES    7.  Patient has the following Care Path diagnoses on Problem List:  DIABETES    Has patient ever had diabetes education? Yes, and is NOT interested in more at this time.      8.  MDX printed and highlighted for Provider? NO/ MDX completed 3/13/18    9.  Patient was advised: “This is a free wellness visit. The provider will screen for medical conditions to help you stay healthy. If you have other concerns to address you may be asked to discuss these at a separate visit or there may be an additional fee.”     10.  Patient was informed to arrive 15 min prior to their scheduled appointment and bring in their medication bottles.

## 2018-10-03 ENCOUNTER — OFFICE VISIT (OUTPATIENT)
Dept: MEDICAL GROUP | Facility: LAB | Age: 73
End: 2018-10-03
Payer: MEDICARE

## 2018-10-03 VITALS
HEART RATE: 82 BPM | BODY MASS INDEX: 25.07 KG/M2 | OXYGEN SATURATION: 96 % | WEIGHT: 132.8 LBS | SYSTOLIC BLOOD PRESSURE: 108 MMHG | DIASTOLIC BLOOD PRESSURE: 68 MMHG | HEIGHT: 61 IN | RESPIRATION RATE: 14 BRPM | TEMPERATURE: 98 F

## 2018-10-03 DIAGNOSIS — F32.4 MAJOR DEPRESSIVE DISORDER WITH SINGLE EPISODE, IN PARTIAL REMISSION (HCC): ICD-10-CM

## 2018-10-03 DIAGNOSIS — I10 ESSENTIAL HYPERTENSION: ICD-10-CM

## 2018-10-03 DIAGNOSIS — Z00.00 MEDICARE ANNUAL WELLNESS VISIT, SUBSEQUENT: Primary | ICD-10-CM

## 2018-10-03 DIAGNOSIS — I51.7 LEFT VENTRICULAR HYPERTROPHY: ICD-10-CM

## 2018-10-03 DIAGNOSIS — E78.5 HYPERLIPIDEMIA WITH TARGET LDL LESS THAN 100: ICD-10-CM

## 2018-10-03 DIAGNOSIS — E11.9 TYPE 2 DIABETES MELLITUS WITHOUT COMPLICATION, WITHOUT LONG-TERM CURRENT USE OF INSULIN (HCC): ICD-10-CM

## 2018-10-03 DIAGNOSIS — H43.811 POSTERIOR VITREOUS DETACHMENT OF RIGHT EYE: ICD-10-CM

## 2018-10-03 DIAGNOSIS — Z87.39 HISTORY OF GOUT: ICD-10-CM

## 2018-10-03 DIAGNOSIS — J30.1 SEASONAL ALLERGIC RHINITIS DUE TO POLLEN: ICD-10-CM

## 2018-10-03 DIAGNOSIS — R87.810 CERVICAL HIGH RISK HPV (HUMAN PAPILLOMAVIRUS) TEST POSITIVE: ICD-10-CM

## 2018-10-03 DIAGNOSIS — Z12.31 ENCOUNTER FOR SCREENING MAMMOGRAM FOR BREAST CANCER: ICD-10-CM

## 2018-10-03 DIAGNOSIS — R94.39 ABNORMAL CARDIOVASCULAR STRESS TEST: ICD-10-CM

## 2018-10-03 DIAGNOSIS — M51.36 DDD (DEGENERATIVE DISC DISEASE), LUMBAR: ICD-10-CM

## 2018-10-03 DIAGNOSIS — J44.9 CHRONIC OBSTRUCTIVE PULMONARY DISEASE, UNSPECIFIED COPD TYPE (HCC): ICD-10-CM

## 2018-10-03 DIAGNOSIS — H40.053 BILATERAL OCULAR HYPERTENSION: ICD-10-CM

## 2018-10-03 DIAGNOSIS — Z23 NEED FOR VACCINATION: ICD-10-CM

## 2018-10-03 DIAGNOSIS — H25.812 COMBINED FORMS OF AGE-RELATED CATARACT OF LEFT EYE: ICD-10-CM

## 2018-10-03 DIAGNOSIS — K76.0 FATTY LIVER: ICD-10-CM

## 2018-10-03 DIAGNOSIS — E55.9 VITAMIN D INSUFFICIENCY: ICD-10-CM

## 2018-10-03 DIAGNOSIS — E28.39 ESTROGEN DEFICIENCY: ICD-10-CM

## 2018-10-03 LAB
HBA1C MFR BLD: 6.8 % (ref ?–5.8)
INT CON NEG: NEGATIVE
INT CON POS: POSITIVE

## 2018-10-03 PROCEDURE — 90662 IIV NO PRSV INCREASED AG IM: CPT | Performed by: FAMILY MEDICINE

## 2018-10-03 PROCEDURE — G0008 ADMIN INFLUENZA VIRUS VAC: HCPCS | Performed by: FAMILY MEDICINE

## 2018-10-03 PROCEDURE — 83036 HEMOGLOBIN GLYCOSYLATED A1C: CPT | Performed by: FAMILY MEDICINE

## 2018-10-03 PROCEDURE — G0439 PPPS, SUBSEQ VISIT: HCPCS | Mod: 25 | Performed by: FAMILY MEDICINE

## 2018-10-03 RX ORDER — CYCLOBENZAPRINE HCL 10 MG
TABLET ORAL
Qty: 90 TAB | Refills: 3 | Status: SHIPPED | OUTPATIENT
Start: 2018-10-03 | End: 2019-11-11 | Stop reason: SDUPTHER

## 2018-10-03 ASSESSMENT — ACTIVITIES OF DAILY LIVING (ADL): BATHING_REQUIRES_ASSISTANCE: 0

## 2018-10-03 ASSESSMENT — PATIENT HEALTH QUESTIONNAIRE - PHQ9
SUM OF ALL RESPONSES TO PHQ QUESTIONS 1-9: 0
CLINICAL INTERPRETATION OF PHQ2 SCORE: 0

## 2018-10-03 ASSESSMENT — ENCOUNTER SYMPTOMS: GENERAL WELL-BEING: GOOD

## 2018-10-03 NOTE — PROGRESS NOTES
Chief Complaint   Patient presents with   • Annual Wellness Visit         HPI:  Kenia is a 73 y.o. here for Medicare Annual Wellness Visit    Patient Active Problem List    Diagnosis Date Noted   • Type 2 diabetes mellitus without complication, without long-term current use of insulin ([facility]) 10/03/2018   • Major depressive disorder with single episode, in partial remission ([facility]) 03/14/2018   • Left ventricular hypertrophy 12/05/2017   • Fatty liver 12/05/2017   • Combined forms of age-related cataract of left eye 10/17/2017   • Bilateral ocular hypertension 07/06/2015   • Posterior vitreous detachment of right eye 07/06/2015   • Abnormal cardiovascular stress test 08/06/2014   • DDD (degenerative disc disease), lumbar 08/14/2013   • Seasonal allergic rhinitis 04/19/2010   • Vitamin D insufficiency 04/02/2010   • HTN (hypertension) 12/18/2009   • Hyperlipidemia with target LDL less than 100 06/11/2009   • COPD (chronic obstructive pulmonary disease) ([facility]) 06/11/2009   • History of Gout when on HCTZ 06/11/2009   • Cervical High Risk HPV Test Positive 10/18/2006       Current Outpatient Prescriptions   Medication Sig Dispense Refill   • cyclobenzaprine (FLEXERIL) 10 MG Tab TAKE 1/2 TO 1 TABLET AT BEDTIME AS NEEDED FOR PAIN/SPASMS 90 Tab 3   • spironolactone (ALDACTONE) 25 MG Tab Take 1 Tab by mouth every day. 30 Tab 11   • metFORMIN ER (GLUCOPHAGE XR) 500 MG TABLET SR 24 HR TAKE 4 TABLETS BY MOUTH EVERY DAY.  360 Tab 3   • rosuvastatin (CRESTOR) 10 MG Tab TAKE ONE TABLET BY MOUTH DAILY 90 Tab 3   • sertraline (ZOLOFT) 100 MG Tab TAKE 1 TABLET BY MOUTH DAILY. 90 Tab 3   • glipiZIDE (GLUCOTROL) 10 MG Tab Take 1 Tab by mouth 2 times a day. 180 Tab 3   • sitagliptin (JANUVIA) 100 MG Tab TAKE ONE TABLET BY MOUTH DAILY 90 Tab 3   • Mometasone Furo-Formoterol Fum (DULERA) 100-5 MCG/ACT Aerosol Inhale 1 Puff by mouth 2 Times a Day. 3 Inhaler 3   • albuterol 108 (90 BASE) MCG/ACT Aero Soln inhalation aerosol Inhale 1-2 Puffs  by mouth every 6 hours as needed for Shortness of Breath. 1 Inhaler 3   • cetirizine (ZYRTEC) 10 MG TABS Take 10 mg by mouth every day.       • insulin glargine (LANTUS SOLOSTAR) 100 UNIT/ML Solution Pen-injector injection Inject 15 Units as instructed every evening. 15 mL 5   • Insulin Pen Needle 32G X 4 MM Misc Using one per day with Lantus injection 90 Each 3   • lisinopril (PRINIVIL, ZESTRIL) 40 MG tablet Take 1 Tab by mouth every day. 30 Tab 11   • aspirin EC (ECOTRIN) 81 MG Tablet Delayed Response Take 81 mg by mouth every day.     • diltiazem CD (CARTIA XT) 300 MG CAPSULE SR 24 HR Take 1 Cap by mouth every day. 90 Cap 3   • Polyethyl Glycol-Propyl Glycol (SYSTANE FREE OP) by Ophthalmic route.     • glucose blood (ONE TOUCH ULTRA TEST) strip USE TO TEST BLOOD SUGAR DAILY 100 Strip 11   • CRESTOR 10 MG Tab TAKE 1 TABLET BY MOUTH DAILY. 30 Tab 5   • ZetoCAN FINEPOINT LANCETS MISC USE TO TEST BLOOD SUGAR DAILY 100 Each 11   • fluticasone (FLONASE) 50 MCG/ACT nasal spray USE ONE SPRAY IN EACH NOSTRIL DAILY 1 Bottle 5   • NON SPECIFIED by Other route. 1. Lancettes. Use daily #100 RF x 1 year  2. Glucometer test strips. Use daily #100 RF x 1 year.  Dx: 250.00 100 Each 1   • vitamin D (CHOLECALCIFEROL) 1000 UNIT TABS Take 1,000 Units by mouth every day.       No current facility-administered medications for this visit.         Patient is taking medications as noted in medication list.  Current supplements as per medication list.     Allergies: Amlodipine; Pcn [penicillins]; Sulfa drugs; and Toprol xl [metoprolol]    Current social contact/activities: visit neighbors     Is patient current with immunizations? No, due for FLU and SHINGRIX (Shingles). Patient is interested in receiving FLU today.    She  reports that she quit smoking about 23 years ago. Her smoking use included Cigarettes. She started smoking about 52 years ago. She has a 29.00 pack-year smoking history. She has never used smokeless tobacco. She  reports that she drinks alcohol. She reports that she uses drugs, including Marijuana.  Counseling given: Yes        DPA/Advanced directive: Patient does not have an Advanced Directive.  A packet and workshop information was given on Advanced Directives.    ROS:    Gait: Uses no assistive device   Ostomy: No   Other tubes: No  Amputations: No   Chronic oxygen use No   Last eye exam 6 months ago   Wears hearing aids: No   : Reports urinary leakage during the last 6 months that has somewhat interfered with their daily activities or sleep.    Screening:    DIABETES    Has patient ever had diabetes education? Yes, and is NOT interested in more at this time.        COPD    1.  Is patient under the care of a pulmonologist? No.  2.  Has patient ever completed a PFT or spirometry? Yes, on 5/19/16.  3.  Is patient on oxygen or CPAP? No.  4.  Has patient ever had instruction on inhaler technique by health care professional? Yes  5.  Is patient interested in a referral to respiratory therapy for more information on COPD, inhaler technique, and/or information on establishing an action plan?  No, patient is NOT interested.*    Depression Screening    Little interest or pleasure in doing things?  0 - not at all  Feeling down, depressed, or hopeless? 0 - not at all  Patient Health Questionnaire Score: 0    If depressive symptoms identified deferred to follow up visit unless specifically addressed in assessment and plan.    Interpretation of PHQ-9 Total Score   Score Severity   1-4 No Depression   5-9 Mild Depression   10-14 Moderate Depression   15-19 Moderately Severe Depression   20-27 Severe Depression    Screening for Cognitive Impairment    Three Minute Recall (leader, season, table)  0/3    Bernard clock face with all 12 numbers and set the hands to show 10 past 11.  Yes 4/5  If cognitive concerns identified, deferred for follow up unless specifically addressed in assessment and plan.    Fall Risk Assessment    Has the  patient had two or more falls in the last year or any fall with injury in the last year?  No  If fall risk identified, deferred for follow up unless specifically addressed in assessment and plan.    Safety Assessment    Throw rugs on floor.  Yes  Handrails on all stairs.  Yes  Good lighting in all hallways.  Yes  Difficulty hearing.  No  Patient counseled about all safety risks that were identified.    Functional Assessment ADLs    Are there any barriers preventing you from cooking for yourself or meeting nutritional needs?  No.    Are there any barriers preventing you from driving safely or obtaining transportation?  No.    Are there any barriers preventing you from using a telephone or calling for help?  No.    Are there any barriers preventing you from shopping?  No.    Are there any barriers preventing you from taking care of your own finances?  No.    Are there any barriers preventing you from managing your medications?  No.    Are there any barriers preventing you from showering, bathing or dressing yourself?  No.    Are you currently engaging in any exercise or physical activity?  Yes.  House chores.  What is your perception of your health?  Good.    Health Maintenance Summary                IMM HEP B VACCINE Overdue 6/16/1966     IMM ZOSTER VACCINES Overdue 8/27/2010      Done 7/2/2010 Imm Admin: Zoster Vaccine Live (ZVL) (Zostavax)    PFT SCREENING-FEV1 AND FEV/FVC RATIO / SPIROMETRY SHOULD BE PERFORMED ANNUALLY Overdue 5/19/2017      Done 5/19/2016 PFT DICTATED RESULTS     Patient has more history with this topic...    MAMMOGRAM Overdue 5/12/2018      Done 5/12/2016 MA-SCREEN MAMMO W/CAD-BILAT     Patient has more history with this topic...    BONE DENSITY Overdue 7/12/2018      Done 7/12/2013 DS-BONE DENSITY STUDY (DEXA)     Patient has more history with this topic...    Annual Wellness Visit Overdue 7/20/2018      Done 7/19/2017 Visit Dx: Medicare annual wellness visit, subsequent     Patient has more  history with this topic...    IMM INFLUENZA Overdue 9/1/2018      Done 12/8/2017 Imm Admin: Influenza Vaccine Adult HD     Patient has more history with this topic...    A1C SCREENING Overdue 9/6/2018      Done 3/6/2018 HEMOGLOBIN A1C      Patient has more history with this topic...    FASTING LIPID PROFILE Next Due 3/6/2019      Done 3/6/2018 LIPID PROFILE     Patient has more history with this topic...    URINE ACR / MICROALBUMIN Next Due 3/6/2019      Done 3/6/2018 MICROALBUMIN CREAT RATIO URINE     Patient has more history with this topic...    DIABETES MONOFILAMENT / LE EXAM Next Due 3/14/2019      Done 3/14/2018 [employer]: WORKFLOW - DIABETES - DIABETIC FOOT EXAM PERFORMED     Patient has more history with this topic...    RETINAL SCREENING Next Due 5/2/2019      Done 5/2/2018 REFERRAL FOR RETINAL SCREENING EXAM     Patient has more history with this topic...    SERUM CREATININE Next Due 5/7/2019      Done 5/7/2018 BASIC METABOLIC PANEL      Patient has more history with this topic...    COLONOSCOPY Next Due 3/15/2021      Done 3/15/2018 REFERRAL TO GI FOR COLONOSCOPY    IMM DTaP/Tdap/Td Vaccine Next Due 2/27/2025      Done 2/27/2015 Imm Admin: Tdap Vaccine          Patient Care Team:  Kenia Parr M.D. as PCP - General (Family Medicine)  Elyse Flores M.D. as Consulting Physician (Cardiology)  Italia Oliveros M.D. as Consulting Physician (Ophthalmology)  Alfredo Munroe M.D. as Consulting Physician (Endocrinology)    Social History   Substance Use Topics   • Smoking status: Former Smoker     Packs/day: 1.00     Years: 29.00     Types: Cigarettes     Start date: 1/1/1966     Quit date: 1/1/1995   • Smokeless tobacco: Never Used   • Alcohol use 0.0 oz/week      Comment: 1 glass occasionally     Family History   Problem Relation Age of Onset   • Heart Disease Father         d. MI 50s   • Hypertension Father    • Heart Attack Father    • Lung Disease Mother         d. lung dz   • Hypertension  "Mother    • Cancer Paternal Grandfather         Black Lung   • Diabetes Paternal Grandmother    • Heart Disease Sister 59        mi and CVA -    • Stroke Sister    • Heart Attack Brother    • Diabetes Other         Paternal Cousin DM1     She  has a past medical history of Abnormal stress electrocardiogram test using treadmill (8/11/2014); Allergic rhinitis (4/19/2010); Arthritis; Asthma, exogenous (6/11/2009); Bilateral ocular hypertension (7/6/2015); Cataract; Cervical High Risk HPV Test Positive (10/18/2006); COPD (chronic obstructive pulmonary disease) ([facility]) (6/11/2009); DDD (degenerative disc disease), lumbar (8/14/2013); Depression (6/11/2009); Emphysema of lung ([facility]); Glaucoma; Glaucoma suspect (6/11/2009); HDL deficiency (3/30/2012); Heart burn; History of Gout when on HCTZ (6/11/2009); Hyperlipidemia LDL goal < 100 (6/11/2009); Hyperplastic colon polyp (12/7/2007); Hypertension; Hypertriglyceridemia (3/30/2012); Personal history of allergy to eggs (4/2/2010); Posterior vitreous detachment of right eye (7/6/2015); Seizure ([facility]) (1953); Type II or unspecified type diabetes mellitus without mention of complication, not stated as uncontrolled; and Vitamin d deficiency (4/2/2010). She also has no past medical history of Encounter for long-term (current) use of other medications.   Past Surgical History:   Procedure Laterality Date   • CATARACT PHACO WITH IOL Right 11/7/2017    Procedure: CATARACT PHACO WITH IOL;  Surgeon: Carlos Arce M.D.;  Location: SURGERY SAME DAY [facility];  Service: Ophthalmology   • CATARACT PHACO WITH IOL Left 10/17/2017    Procedure: CATARACT PHACO WITH IOL;  Surgeon: Carlos Arce M.D.;  Location: SURGERY SAME DAY [facility];  Service: Ophthalmology   • EYE SURGERY Bilateral 2013    Dr. Garrido   • SINUSOTOMIES  1999   • EYE SURGERY  1997    \"laser for eye pressure\"   • DILATION AND CURETTAGE  1981   • TONSILLECTOMY  1953           Exam:     Blood pressure 108/68, pulse 82, " "temperature 36.7 °C (98 °F), temperature source Temporal, resp. rate 14, height 1.549 m (5' 1\"), weight 60.2 kg (132 lb 12.8 oz), last menstrual period 01/01/2000, SpO2 96 %, not currently breastfeeding. Body mass index is 25.09 kg/m².    Hearing good.    Dentition good  Alert, oriented in no acute distress.  Eye contact is good, speech goal directed, affect calm  CV: RRR, no m/r/g, trace edema  Pulm: CTAB  Abd: Bloated abdomen, no TTP, no organomegaly noted     Assessment and Plan. The following treatment and monitoring plan is recommended:    1. Medicare annual wellness visit, subsequent  Age-appropriate counseling given, mammogram and bone density scans ordered, flu shot given today, recommended updated Shingrix, discussed importance of diet and exercise  - Annual Wellness Visit - Includes PPPS Subsequent ()    2. Need for vaccination  - INFLUENZA VACCINE, HIGH DOSE (65+ ONLY)  - Annual Wellness Visit - Includes PPPS Subsequent ()    3. Type 2 diabetes mellitus without complication, without long-term current use of insulin (HCC)  Chronic, improved from the last visit.  Continue Januvia, metformin, glipizide.  She is on ACE inhibitor and statin and aspirin.  Continue blood sugar monitoring.  We will recheck in 4 months  - POCT A1C  - Annual Wellness Visit - Includes PPPS Subsequent ()    4. Vitamin D insufficiency  Chronic, states that she is on vitamin D supplementation over-the-counter.  Unsure of dosing.  We will recheck labs and modify based on  - Annual Wellness Visit - Includes PPPS Subsequent ()  - VITAMIN D,25 HYDROXY; Future    5. Seasonal allergic rhinitis due to pollen  Chronic, currently stable on Flonase  - Annual Wellness Visit - Includes PPPS Subsequent ()    6. Posterior vitreous detachment of right eye  Chronic, follows with ophthalmology regularly.  Last eye exam was 5 months ago  - Annual Wellness Visit - Includes PPPS Subsequent ()    7. Major depressive disorder " with single episode, in partial remission (HCC)  This is chronic, currently stable on Zoloft 100 mg daily without side effects and minimal symptoms  - Annual Wellness Visit - Includes PPPS Subsequent ()    8. Left ventricular hypertrophy  This is chronic, currently stable and following with cardiology Dr. Flores  - Annual Wellness Visit - Includes PPPS Subsequent ()    9. Hyperlipidemia with target LDL less than 100  Chronic, stable on rosuvastatin 10 mg daily and patient has repeat labs ordered by cardiology  - Annual Wellness Visit - Includes PPPS Subsequent ()    10. Essential hypertension  Chronic, stable on current regimen of Spironolactone 25 mg daily, diltiazem 300 mg daily, lisinopril 40 mg daily  - Annual Wellness Visit - Includes PPPS Subsequent ()    11. History of Gout when on HCTZ  Chronic, stable and no current symptoms since being off of hydrochlorothiazide  - Annual Wellness Visit - Includes PPPS Subsequent ()    12. Fatty liver  Chronic, present on ultrasound.  Most recent LFTs from 6 months ago were within normal limits.  We will need to check CBC with next labs to monitor platelet function  - Annual Wellness Visit - Includes PPPS Subsequent ()    13. DDD (degenerative disc disease), lumbar  This is chronic, currently stable but she does get flares of pain.  She uses Flexeril for this.  Discussed importance of regular exercise, stretching and yoga  - Annual Wellness Visit - Includes PPPS Subsequent ()  - cyclobenzaprine (FLEXERIL) 10 MG Tab; TAKE 1/2 TO 1 TABLET AT BEDTIME AS NEEDED FOR PAIN/SPASMS  Dispense: 90 Tab; Refill: 3    14. Chronic obstructive pulmonary disease, unspecified COPD type (HCC)  This is chronic, stable and following with pulmonology.  She is on Dulera inhaler which she is consistent with  - Annual Wellness Visit - Includes PPPS Subsequent ()    15. Combined forms of age-related cataract of left eye  This is chronic, stable and following with  ophthalmology, last eye exam was in May 2018  - Annual Wellness Visit - Includes PPPS Subsequent ()    16. Cervical High Risk HPV Test Positive  Chronic, stable and last Pap smear 1 year ago.  She was told that this should be her last Pap smear by her report  - Annual Wellness Visit - Includes PPPS Subsequent ()    17. Bilateral ocular hypertension  This is chronic, currently stable and following with ophthalmology  - Annual Wellness Visit - Includes PPPS Subsequent ()    18. Abnormal cardiovascular stress test  Chronic, stable and following with cardiology regularly.  No chest pain with ambulation  - Annual Wellness Visit - Includes PPPS Subsequent ()    19. Encounter for screening mammogram for breast cancer  - [id number]; Future  - Annual Wellness Visit - Includes PPPS Subsequent ()    20. Estrogen deficiency  - DS-BONE DENSITY STUDY (DEXA); Future  - Annual Wellness Visit - Includes PPPS Subsequent ()      Services suggested: No services needed at this time  Health Care Screening recommendations as per orders if indicated.  Referrals offered: PT/OT/Nutrition counseling/Behavioral Health/Smoking cessation as per orders if indicated.    Discussion today about general wellness and lifestyle habits:    · Prevent falls and reduce trip hazards; Cautioned about securing or removing rugs.  · Have a working fire alarm and carbon monoxide detector;   · Engage in regular physical activity and social activities       Follow-up: Return in about 4 months (around 2/3/2019) for DM (a1c this visit).

## 2018-10-03 NOTE — PATIENT INSTRUCTIONS
Advance Directive  Advance directives are the legal documents that allow you to make choices about your health care and medical treatment if you cannot speak for yourself. Advance directives are a way for you to communicate your wishes to family, friends, and health care providers. The specified people can then convey your decisions about end-of-life care to avoid confusion if you should become unable to communicate.  Ideally, the process of discussing and writing advance directives should happen over time rather than making decisions all at once. Advance directives can be modified as your situation changes, and you can change your mind at any time, even after you have signed the advance directives. Each state has its own laws regarding advance directives.  You may want to check with your health care provider, , or state representative about the law in your state. Below are some examples of advance directives.  HEALTH CARE PROXY AND DURABLE POWER OF  FOR HEALTH CARE  A health care proxy is a person (agent) appointed to make medical decisions for you if you cannot. Generally, people choose someone they know well and trust to represent their preferences when they can no longer do so. You should be sure to ask this person for agreement to act as your agent. An agent may have to exercise judgment in the event of a medical decision for which your wishes are not known.  A durable power of  for health care is a legal document that names your health care proxy. Depending on the laws in your state, after the document is written, it may also need to be:  · Signed.  · Notarized.  · Dated.  · Copied.  · Witnessed.  · Incorporated into your medical record.  You may also want to appoint someone to manage your financial affairs if you cannot. This is called a durable power of  for finances. It is a separate legal document from the durable power of  for health care. You may choose the same  person or someone different from your health care proxy to act as your agent in financial matters.  LIVING WILL  A living will is a set of instructions documenting your wishes about medical care when you cannot care for yourself. It is used if you become:  · Terminally ill.  · Incapacitated.  · Unable to communicate.  · Unable to make decisions.  Items to consider in your living will include:  · The use or non-use of life-sustaining equipment, such as dialysis machines and breathing machines (ventilators).  · A do not resuscitate (DNR) order, which is the instruction not to use cardiopulmonary resuscitation (CPR) if breathing or heartbeat stops.  · Tube feeding.  · Withholding of food and fluids.  · Comfort (palliative) care when the goal becomes comfort rather than a cure.  · Organ and tissue donation.  A living will does not give instructions about distribution of your money and property if you should pass away. It is advisable to seek the expert advice of a  in drawing up a will regarding your possessions. Decisions about taxes, beneficiaries, and asset distribution will be legally binding. This process can relieve your family and friends of any burdens surrounding disputes or questions that may come up about the allocation of your assets.  DO NOT RESUSCITATE (DNR)  A do not resuscitate (DNR) order is a request to not have CPR in the event that your heart stops beating or you stop breathing. Unless given other instructions, a health care provider will try to help any patient whose heart has stopped or who has stopped breathing.   This information is not intended to replace advice given to you by your health care provider. Make sure you discuss any questions you have with your health care provider.  Document Released: 03/26/2009 Document Revised: 04/10/2017 Document Reviewed: 05/07/2014  Elsevier Interactive Patient Education © 2017 [employer]

## 2018-10-15 ENCOUNTER — PATIENT OUTREACH (OUTPATIENT)
Dept: HEALTH INFORMATION MANAGEMENT | Facility: OTHER | Age: 73
End: 2018-10-15

## 2018-10-15 NOTE — PROGRESS NOTES
Outcome: Left Message    Please transfer to Patient Outreach Team at [phone] when patient returns call.    WebIZ Checked & Epic Updated:  yes  Td (adult), adsorbed   Zoster Joshua (Shingrix)     [facility] Verified: yes  Effective Date: 1/1/2018     Attempt # .1

## 2018-10-30 NOTE — PROGRESS NOTES
Outcome: Left Message    Please transfer to Patient Outreach Team at [phone] when patient returns call.    Attempt # 2

## 2018-11-06 RX ORDER — MOMETASONE FUROATE AND FORMOTEROL FUMARATE DIHYDRATE 100; 5 UG/1; UG/1
AEROSOL RESPIRATORY (INHALATION)
Qty: 26 G | Refills: 2 | Status: SHIPPED | OUTPATIENT
Start: 2018-11-06 | End: 2019-09-20 | Stop reason: SDUPTHER

## 2018-11-06 RX ORDER — DILTIAZEM HYDROCHLORIDE 300 MG/1
CAPSULE, EXTENDED RELEASE ORAL
Qty: 90 CAP | Refills: 0 | Status: SHIPPED | OUTPATIENT
Start: 2018-11-06 | End: 2019-03-08 | Stop reason: SDUPTHER

## 2018-11-07 NOTE — PROGRESS NOTES
Outcome: Left Message    Please transfer to Patient Outreach Team at [phone] when patient returns call.    Attempt # 3

## 2018-11-08 ENCOUNTER — HOSPITAL ENCOUNTER (OUTPATIENT)
Dept: LAB | Facility: MEDICAL CENTER | Age: 73
End: 2018-11-08
Attending: INTERNAL MEDICINE
Payer: MEDICARE

## 2018-11-08 DIAGNOSIS — Z79.899 HIGH RISK MEDICATION USE: ICD-10-CM

## 2018-11-08 DIAGNOSIS — I10 HTN (HYPERTENSION), MALIGNANT: ICD-10-CM

## 2018-11-08 LAB
ALBUMIN SERPL BCP-MCNC: 4.4 G/DL (ref 3.2–4.9)
ALBUMIN/GLOB SERPL: 1.5 G/DL
ALP SERPL-CCNC: 71 U/L (ref 30–99)
ALT SERPL-CCNC: 19 U/L (ref 2–50)
ANION GAP SERPL CALC-SCNC: 10 MMOL/L (ref 0–11.9)
AST SERPL-CCNC: 19 U/L (ref 12–45)
BILIRUB SERPL-MCNC: 0.4 MG/DL (ref 0.1–1.5)
BUN SERPL-MCNC: 17 MG/DL (ref 8–22)
CALCIUM SERPL-MCNC: 10 MG/DL (ref 8.5–10.5)
CHLORIDE SERPL-SCNC: 106 MMOL/L (ref 96–112)
CHOLEST SERPL-MCNC: 145 MG/DL (ref 100–199)
CO2 SERPL-SCNC: 20 MMOL/L (ref 20–33)
CREAT SERPL-MCNC: 1.2 MG/DL (ref 0.5–1.4)
FASTING STATUS PATIENT QL REPORTED: NORMAL
GLOBULIN SER CALC-MCNC: 3 G/DL (ref 1.9–3.5)
GLUCOSE SERPL-MCNC: 174 MG/DL (ref 65–99)
HDLC SERPL-MCNC: 51 MG/DL
LDLC SERPL CALC-MCNC: 60 MG/DL
POTASSIUM SERPL-SCNC: 5.1 MMOL/L (ref 3.6–5.5)
PROT SERPL-MCNC: 7.4 G/DL (ref 6–8.2)
SODIUM SERPL-SCNC: 136 MMOL/L (ref 135–145)
TRIGL SERPL-MCNC: 172 MG/DL (ref 0–149)

## 2018-11-08 PROCEDURE — 80061 LIPID PANEL: CPT

## 2018-11-08 PROCEDURE — 80053 COMPREHEN METABOLIC PANEL: CPT

## 2018-11-08 PROCEDURE — 36415 COLL VENOUS BLD VENIPUNCTURE: CPT

## 2018-11-08 RX ORDER — SITAGLIPTIN 100 MG/1
TABLET, FILM COATED ORAL
Qty: 90 TAB | Refills: 2 | Status: SHIPPED | OUTPATIENT
Start: 2018-11-08 | End: 2019-07-23 | Stop reason: SDUPTHER

## 2018-11-08 NOTE — TELEPHONE ENCOUNTER
Was the patient seen in the last year in this department? Yes lov 10/03/2018    Does patient have an active prescription for medications requested? No     Received Request Via: Pharmacy

## 2018-11-10 NOTE — PROGRESS NOTES
Outcome: Left Message    Please transfer to Patient Outreach Team at [phone] when patient returns call.    Attempt # 4

## 2019-01-16 ENCOUNTER — OFFICE VISIT (OUTPATIENT)
Dept: URGENT CARE | Facility: PHYSICIAN GROUP | Age: 74
End: 2019-01-16
Payer: MEDICARE

## 2019-01-16 VITALS
OXYGEN SATURATION: 94 % | DIASTOLIC BLOOD PRESSURE: 66 MMHG | HEART RATE: 100 BPM | TEMPERATURE: 98.2 F | SYSTOLIC BLOOD PRESSURE: 124 MMHG | WEIGHT: 129 LBS | RESPIRATION RATE: 14 BRPM | BODY MASS INDEX: 24.37 KG/M2

## 2019-01-16 DIAGNOSIS — R06.02 SOB (SHORTNESS OF BREATH): ICD-10-CM

## 2019-01-16 DIAGNOSIS — J06.9 URI WITH COUGH AND CONGESTION: ICD-10-CM

## 2019-01-16 PROCEDURE — 99204 OFFICE O/P NEW MOD 45 MIN: CPT | Performed by: PHYSICIAN ASSISTANT

## 2019-01-16 RX ORDER — ALBUTEROL SULFATE 90 UG/1
1-2 AEROSOL, METERED RESPIRATORY (INHALATION) EVERY 6 HOURS PRN
Qty: 1 INHALER | Refills: 3 | Status: SHIPPED | OUTPATIENT
Start: 2019-01-16 | End: 2020-03-31 | Stop reason: SDUPTHER

## 2019-01-16 RX ORDER — DOXYCYCLINE 100 MG/1
100 TABLET ORAL 2 TIMES DAILY
Qty: 20 TAB | Refills: 0 | Status: SHIPPED | OUTPATIENT
Start: 2019-01-16 | End: 2019-03-08

## 2019-01-16 NOTE — PROGRESS NOTES
Chief Complaint   Patient presents with   • Sinusitis       HISTORY OF PRESENT ILLNESS: Patient is a 73 y.o. female who presents today for the following:    Nasal congestion x 2-3 weeks  Yellow/bloody nasal congestio  Mild cough but feels its from throat irritation from PND  Albuterol isn't helping  + SOB, h/o asthma  Denies fever but does report chills     Patient Active Problem List    Diagnosis Date Noted   • Type 2 diabetes mellitus without complication, without long-term current use of insulin ([facility]) 10/03/2018   • Major depressive disorder with single episode, in partial remission ([facility]) 03/14/2018   • Left ventricular hypertrophy 12/05/2017   • Fatty liver 12/05/2017   • Combined forms of age-related cataract of left eye 10/17/2017   • Bilateral ocular hypertension 07/06/2015   • Posterior vitreous detachment of right eye 07/06/2015   • Abnormal cardiovascular stress test 08/06/2014   • DDD (degenerative disc disease), lumbar 08/14/2013   • Seasonal allergic rhinitis 04/19/2010   • Vitamin D insufficiency 04/02/2010   • HTN (hypertension) 12/18/2009   • Hyperlipidemia with target LDL less than 100 06/11/2009   • COPD (chronic obstructive pulmonary disease) ([facility]) 06/11/2009   • History of Gout when on HCTZ 06/11/2009   • Cervical High Risk HPV Test Positive 10/18/2006       Allergies:Amlodipine; Pcn [penicillins]; Sulfa drugs; and Toprol xl [metoprolol]    Current Outpatient Prescriptions Ordered in [facility]   Medication Sig Dispense Refill   • albuterol 108 (90 Base) MCG/ACT Aero Soln inhalation aerosol Inhale 1-2 Puffs by mouth every 6 hours as needed for Shortness of Breath. 1 Inhaler 3   • doxycycline monohydrate (ADOXA) 100 MG tablet Take 1 Tab by mouth 2 times a day. 20 Tab 0   • JANUVIA 100 MG Tab TAKE ONE TABLET BY MOUTH DAILY 90 Tab 2   • CARTIA  MG CAPSULE SR 24 HR TAKE ONE CAPSULE BY MOUTH DAILY 90 Cap 0   • DULERA 100-5 MCG/ACT Aerosol INHALE ONE PUFF BY MOUTH TWICE A DAY 26 g 2   •  cyclobenzaprine (FLEXERIL) 10 MG Tab TAKE 1/2 TO 1 TABLET AT BEDTIME AS NEEDED FOR PAIN/SPASMS 90 Tab 3   • insulin glargine (LANTUS SOLOSTAR) 100 UNIT/ML Solution Pen-injector injection Inject 15 Units as instructed every evening. 15 mL 5   • spironolactone (ALDACTONE) 25 MG Tab Take 1 Tab by mouth every day. 30 Tab 11   • metFORMIN ER (GLUCOPHAGE XR) 500 MG TABLET SR 24 HR TAKE 4 TABLETS BY MOUTH EVERY DAY.  360 Tab 3   • Insulin Pen Needle 32G X 4 MM Misc Using one per day with Lantus injection 90 Each 3   • rosuvastatin (CRESTOR) 10 MG Tab TAKE ONE TABLET BY MOUTH DAILY 90 Tab 3   • lisinopril (PRINIVIL, ZESTRIL) 40 MG tablet Take 1 Tab by mouth every day. 30 Tab 11   • aspirin EC (ECOTRIN) 81 MG Tablet Delayed Response Take 81 mg by mouth every day.     • sertraline (ZOLOFT) 100 MG Tab TAKE 1 TABLET BY MOUTH DAILY. 90 Tab 3   • glipiZIDE (GLUCOTROL) 10 MG Tab Take 1 Tab by mouth 2 times a day. 180 Tab 3   • Polyethyl Glycol-Propyl Glycol (SYSTANE FREE OP) by Ophthalmic route.     • glucose blood (ONE TOUCH ULTRA TEST) strip USE TO TEST BLOOD SUGAR DAILY 100 Strip 11   • LIFESCAN FINEPOINT LANCETS MISC USE TO TEST BLOOD SUGAR DAILY 100 Each 11   • fluticasone (FLONASE) 50 MCG/ACT nasal spray USE ONE SPRAY IN EACH NOSTRIL DAILY 1 Bottle 5   • NON SPECIFIED by Other route. 1. Lancettes. Use daily #100 RF x 1 year  2. Glucometer test strips. Use daily #100 RF x 1 year.  Dx: 250.00 100 Each 1   • cetirizine (ZYRTEC) 10 MG TABS Take 10 mg by mouth every day.       • vitamin D (CHOLECALCIFEROL) 1000 UNIT TABS Take 1,000 Units by mouth every day.       No current [facility]-ordered facility-administered medications on file.        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   • Cataract     bilat   • Cervical High Risk HPV Test Positive  "10/18/2006   • COPD (chronic obstructive pulmonary disease) ([facility]) 2009   • DDD (degenerative disc disease), lumbar 2013   • Depression 2009   • Emphysema of lung ([facility])    • Glaucoma    • Glaucoma suspect 2009   • HDL deficiency 3/30/2012   • Heart burn    • History of Gout when on HCTZ 2009   • Hyperlipidemia LDL goal < 100 2009   • Hyperplastic colon polyp 2007   • Hypertension    • Hypertriglyceridemia 3/30/2012   • Personal history of allergy to eggs 2010   • Posterior vitreous detachment of right eye 2015   • Seizure ([facility])     \"sun stroke\"   • Type II or unspecified type diabetes mellitus without mention of complication, not stated as uncontrolled     oral meds and insulin   • Vitamin d deficiency 2010       Social History   Substance Use Topics   • Smoking status: Former Smoker     Packs/day: 1.00     Years: 29.00     Types: Cigarettes     Start date: 1966     Quit date: 1995   • Smokeless tobacco: Never Used   • Alcohol use 0.0 oz/week      Comment: 1 glass occasionally       Family Status   Relation Status   • Fa  at age 53        MI   • Mo  at age 48        Lung dz   • PGFa    • PGMo    • Sis  at age 59           • Bro  at age 63        MI   • MGMo    • MGFa    • OTHER (Not Specified)     Family History   Problem Relation Age of Onset   • Heart Disease Father         d. MI 50s   • Hypertension Father    • Heart Attack Father    • Lung Disease Mother         d. lung dz   • Hypertension Mother    • Cancer Paternal Grandfather         Black Lung   • Diabetes Paternal Grandmother    • Heart Disease Sister 59        mi and CVA -    • Stroke Sister    • Heart Attack Brother    • Diabetes Other         Paternal Cousin DM1       Review of Systems:   Constitutional ROS: No unexpected change in weight, No weakness, No fatigue  Eye ROS: No recent significant change in vision, No eye pain, " redness, discharge  Ear ROS: No drainage, No tinnitus or vertigo, No recent change in hearing  Mouth/Throat ROS: No teeth or gum problems, No bleeding gums, No tongue complaints  Neck ROS: No swollen glands, No significant pain in neck  Cardiovascular ROS: No diaphoresis, No edema, No palpitations  Gastrointestinal ROS: No change in bowel habits, No significant change in appetite, No nausea, vomiting, diarrhea, or constipation  Musculoskeletal/Extremities ROS: No peripheral edema, No pain, redness or swelling on the joints  Hematologic/Lymphatic ROS: No chills, No night sweats, No weight loss  Skin/Integumentary ROS: No edema, No evidence of rash, No itching      Exam:  Blood pressure 124/66, pulse 100, temperature 36.8 °C (98.2 °F), temperature source Temporal, resp. rate 14, weight 58.5 kg (129 lb), last menstrual period 01/01/2000, SpO2 94 %, not currently breastfeeding.  General: Well developed, well nourished. No distress.  Eye: PERRL/EOMI; conjunctivae clear, lids normal.  ENMT: Lips without lesions, MMM. Oropharynx is clear. Bilateral TMs are within normal limits.  Pulmonary: Unlabored respiratory effort. Lungs clear to auscultation, no wheezes, no rhonchi.  Cardiovascular: Regular rate and rhythm without murmur.   Neurologic: Grossly nonfocal. No facial asymmetry noted.  Lymph: No cervical lymphadenopathy noted.  Skin: Warm, dry, good turgor. No rashes in visible areas.   Psych: Normal mood. Alert and oriented x3. Judgment and insight is normal.    Assessment/Plan:  Vitals are stable.  Exam is normal. Discussed possible viral etiology.  Discussed trying sinus rinses to see if this helps her symptoms. Contingent antibiotic prescription given to patient to fill upon meeting criteria of guidelines discussed.  Follow up for worsening or persistent symptoms.  1. URI with cough and congestion  doxycycline monohydrate (ADOXA) 100 MG tablet   2. SOB (shortness of breath)  albuterol 108 (90 Base) MCG/ACT Aero Soln  inhalation aerosol

## 2019-02-25 ENCOUNTER — TELEPHONE (OUTPATIENT)
Dept: MEDICAL GROUP | Facility: LAB | Age: 74
End: 2019-02-25

## 2019-02-25 NOTE — TELEPHONE ENCOUNTER
ESTABLISHED PATIENT PRE-VISIT PLANNING     Patient was NOT contacted to complete PVP.     Note: Patient will not be contacted if there is no indication to call.     1.  Reviewed notes from the last few office visits within the medical group: Yes    2.  If any orders were placed at last visit or intended to be done for this visit (i.e. 6 mos follow-up), do we have Results/Consult Notes?        •  Labs - Labs ordered, completed on 11/08/18 and results are in chart.       •  Imaging - Imaging was not ordered at last office visit.       •  Referrals - No referrals were ordered at last office visit.    3. Is this appointment scheduled as a Hospital Follow-Up? No    4.  Immunizations were updated in Epic using WebIZ?: Epic matches WebIZ       •  Web Iz Recommendations: SHINGRIX (Shingles)    5.  Patient is due for the following Health Maintenance Topics:   Health Maintenance Due   Topic Date Due   • IMM HEP B VACCINE (1 of 3 - Risk 3-dose series) 06/16/1964   • IMM ZOSTER VACCINES (2 of 3) 08/27/2010   • PFT SCREENING-FEV1 AND FEV/FVC RATIO / SPIROMETRY SHOULD BE PERFORMED ANNUALLY  05/19/2017   • MAMMOGRAM  05/12/2018   • BONE DENSITY  07/12/2018       - Patient has completed FLU, PNEUMOVAX (PPSV23), PREVNAR (PCV13)  and TDAP Immunization(s) per WebIZ. Chart has been updated.    6. Orders for overdue Health Maintenance topics pended in Pre-Charting? N\A    7.  AHA (MDX) form printed for Provider? YES    8.  Patient was NOT informed to arrive 15 min prior to their scheduled appointment and bring in their medication bottles.

## 2019-02-26 ENCOUNTER — OFFICE VISIT (OUTPATIENT)
Dept: MEDICAL GROUP | Facility: LAB | Age: 74
End: 2019-02-26
Payer: MEDICARE

## 2019-02-26 VITALS
TEMPERATURE: 97.6 F | HEIGHT: 61 IN | WEIGHT: 130 LBS | OXYGEN SATURATION: 97 % | RESPIRATION RATE: 14 BRPM | BODY MASS INDEX: 24.55 KG/M2 | DIASTOLIC BLOOD PRESSURE: 58 MMHG | SYSTOLIC BLOOD PRESSURE: 112 MMHG | HEART RATE: 71 BPM

## 2019-02-26 DIAGNOSIS — R19.7 DIARRHEA, UNSPECIFIED TYPE: ICD-10-CM

## 2019-02-26 DIAGNOSIS — E11.9 TYPE 2 DIABETES MELLITUS WITHOUT COMPLICATION, WITHOUT LONG-TERM CURRENT USE OF INSULIN (HCC): ICD-10-CM

## 2019-02-26 DIAGNOSIS — J44.9 CHRONIC OBSTRUCTIVE PULMONARY DISEASE, UNSPECIFIED COPD TYPE (HCC): ICD-10-CM

## 2019-02-26 DIAGNOSIS — F32.4 MAJOR DEPRESSIVE DISORDER WITH SINGLE EPISODE, IN PARTIAL REMISSION (HCC): ICD-10-CM

## 2019-02-26 DIAGNOSIS — E16.2 HYPOGLYCEMIA: ICD-10-CM

## 2019-02-26 LAB
HBA1C MFR BLD: 7.8 % (ref ?–5.8)
INT CON NEG: NEGATIVE
INT CON POS: POSITIVE

## 2019-02-26 PROCEDURE — 99214 OFFICE O/P EST MOD 30 MIN: CPT | Performed by: FAMILY MEDICINE

## 2019-02-26 PROCEDURE — 83036 HEMOGLOBIN GLYCOSYLATED A1C: CPT | Performed by: FAMILY MEDICINE

## 2019-02-26 PROCEDURE — 8041 PR SCP AHA: Performed by: FAMILY MEDICINE

## 2019-02-26 RX ORDER — CLOBETASOL PROPIONATE 0.5 MG/G
1 OINTMENT TOPICAL 2 TIMES DAILY
Qty: 60 G | Refills: 11 | Status: SHIPPED | OUTPATIENT
Start: 2019-02-26 | End: 2023-07-17 | Stop reason: SDUPTHER

## 2019-02-26 NOTE — PATIENT INSTRUCTIONS
1. Take metformin 2 pills in the morning and 2 pills in the evening  2. Decrease your insulin to 5U   3. Monitor blood sugar     Today, your Healthcare Provider may have discussed the following recommendations:    1. Exercise and Physical Activity  According to the American Heart Association, it is recommended to engage in physical activity regularly and to aim for 150 minutes of moderate-intensity aerobic activity per week.  Your Healthcare Provider may have recommended taking the stairs instead of the elevator, starting or maintaining a walking program or strength-training program.    2. Emotional Well-being  Mental and emotional well-being is essential to overall health.  Your Healthcare Provider may have encouraged you to build strong, positive relationships with family and friends, become more involved in your community (by volunteering or joining a spiritual community), or focus on self-care.    3. Fall and Injury Prevention  To prevent falls and injuries and also improve your balance, your Healthcare Provider may have suggested that you use a cane or walker, start an exercise of physical therapy program, or have your vision and/or hearing tested.    4. Urinary Leakage (Urinary Incontinence)  To control or manage the leakage of urine, your Healthcare Provider may have recommended you start bladder training exercises (such as Kegel exercises), a trial of a medication or a referral to see a specialist to discuss surgical options.

## 2019-02-26 NOTE — PROGRESS NOTES
Subjective:     Kenia Oconnell is a 73 y.o. female here today for DM and Annual Health Assessment.    1. Type 2 diabetes mellitus without complication, without long-term current use of insulin (HCC)  This is chronic. Stable. Currently taking Lantus 10 units daily at bedtime, metformin XR 2000 mg nightly, Januvia 100mg daily, glipizide 10 mg twice a day as directed. She is also taking a daily aspirin, statin, and ACE-I/ARB.   Reports side effects or hypoglycemic events from medications   Last HbA1c is .8% today, up from 6.8%  She is monitoring blood sugar regularly at home. Fasting blood sugars are   Reports diet is improving  She is not exercising regularly.  Last eye exam: Yearly 2017  Doing regular foot exams and care.  Denies polyuria, polydipsia, blurred vision, early satiety, or neuropathies    2. Major depressive disorder with single episode, in partial remission (HCC)  Chronic.  Overall patient states she is doing very well.  She is currently on Zoloft and has been stable on this medication for many years.  No difficulty sleeping, mood is overall happy.    3. Chronic obstructive pulmonary disease, unspecified COPD type (HCC)  Chronic.  She is stable on Dulera.  She rarely needs to use albuterol inhaler in fact her albuterol inhaler has been  for the last couple of years.    4. Hypoglycemia  This is new.  Patient reports a blood sugar down to 60.  She feels short of breath, has diarrhea during these times.    5. Diarrhea, unspecified type  This is new.  Patient reports diarrhea over the last month.  This occurs intermittently and without warning.  She is taking metformin but has been taking this for quite some time.  She takes 2000 mg at once.  She has had low blood sugar during these episodes and is feeling lightheaded during them.      Health Maintenance Summary                IMM HEP B VACCINE Overdue 1966     IMM ZOSTER VACCINES Overdue 2010      Done 2010 Imm Admin:  Zoster Vaccine Live (ZVL) (Zostavax)    PFT SCREENING-FEV1 AND FEV/FVC RATIO / SPIROMETRY SHOULD BE PERFORMED ANNUALLY Overdue 5/19/2017      Done 5/19/2016 PFT DICTATED RESULTS     Patient has more history with this topic...    MAMMOGRAM Overdue 5/12/2018      Done 5/12/2016 MA-SCREEN MAMMO W/CAD-BILAT     Patient has more history with this topic...    BONE DENSITY Overdue 7/12/2018      Done 7/12/2013 DS-BONE DENSITY STUDY (DEXA)     Patient has more history with this topic...    URINE ACR / MICROALBUMIN Next Due 3/6/2019      Done 3/6/2018 MICROALBUMIN CREAT RATIO URINE     Patient has more history with this topic...    DIABETES MONOFILAMENT / LE EXAM Next Due 3/14/2019      Done 3/14/2018 [employer]: WORKFLOW - DIABETES - DIABETIC FOOT EXAM PERFORMED     Patient has more history with this topic...    A1C SCREENING Next Due 4/3/2019      Done 10/3/2018 POCT A1C     Patient has more history with this topic...    RETINAL SCREENING Next Due 5/2/2019      Done 5/2/2018 REFERRAL FOR RETINAL SCREENING EXAM     Patient has more history with this topic...    Annual Wellness Visit Next Due 10/4/2019      Done 10/3/2018 Visit Dx: Medicare annual wellness visit, subsequent     Patient has more history with this topic...    FASTING LIPID PROFILE Next Due 11/8/2019      Done 11/8/2018 LIPID PROFILE      Patient has more history with this topic...    SERUM CREATININE Next Due 11/8/2019      Done 11/8/2018 COMP METABOLIC PANEL      Patient has more history with this topic...    COLONOSCOPY Next Due 3/15/2021      Done 3/15/2018 REFERRAL TO GI FOR COLONOSCOPY    IMM DTaP/Tdap/Td Vaccine Next Due 2/27/2025      Done 2/27/2015 Imm Admin: Tdap Vaccine           Annual Health Assessment Questions:     1.  Are you currently engaging in any exercise or physical activity? Yes    2.  How would you describe your mood or emotional well-being today? good    3.  Have you had any falls in the last year? No    4.  Have you noticed any problems  with your balance or had difficulty walking? No    5.  In the last six months have you experienced any leakage of urine? Yes    6. DPA/Advanced Directive: Patient does not have an Advanced Directive.  A packet and workshop information was given on Advanced Directives.    Current medicines (including changes today)  Current Outpatient Prescriptions   Medication Sig Dispense Refill   • clobetasol (TEMOVATE) 0.05 % Ointment Apply 1 Application to affected area(s) 2 times a day. 60 g 11   • albuterol 108 (90 Base) MCG/ACT Aero Soln inhalation aerosol Inhale 1-2 Puffs by mouth every 6 hours as needed for Shortness of Breath. 1 Inhaler 3   • JANUVIA 100 MG Tab TAKE ONE TABLET BY MOUTH DAILY 90 Tab 2   • CARTIA  MG CAPSULE SR 24 HR TAKE ONE CAPSULE BY MOUTH DAILY 90 Cap 0   • DULERA 100-5 MCG/ACT Aerosol INHALE ONE PUFF BY MOUTH TWICE A DAY 26 g 2   • cyclobenzaprine (FLEXERIL) 10 MG Tab TAKE 1/2 TO 1 TABLET AT BEDTIME AS NEEDED FOR PAIN/SPASMS 90 Tab 3   • insulin glargine (LANTUS SOLOSTAR) 100 UNIT/ML Solution Pen-injector injection Inject 15 Units as instructed every evening. 15 mL 5   • spironolactone (ALDACTONE) 25 MG Tab Take 1 Tab by mouth every day. 30 Tab 11   • metFORMIN ER (GLUCOPHAGE XR) 500 MG TABLET SR 24 HR TAKE 4 TABLETS BY MOUTH EVERY DAY.  360 Tab 3   • Insulin Pen Needle 32G X 4 MM Misc Using one per day with Lantus injection 90 Each 3   • rosuvastatin (CRESTOR) 10 MG Tab TAKE ONE TABLET BY MOUTH DAILY 90 Tab 3   • lisinopril (PRINIVIL, ZESTRIL) 40 MG tablet Take 1 Tab by mouth every day. 30 Tab 11   • aspirin EC (ECOTRIN) 81 MG Tablet Delayed Response Take 81 mg by mouth every day.     • sertraline (ZOLOFT) 100 MG Tab TAKE 1 TABLET BY MOUTH DAILY. 90 Tab 3   • glipiZIDE (GLUCOTROL) 10 MG Tab Take 1 Tab by mouth 2 times a day. 180 Tab 3   • Polyethyl Glycol-Propyl Glycol (SYSTANE FREE OP) by Ophthalmic route.     • glucose blood (ONE TOUCH ULTRA TEST) strip USE TO TEST BLOOD SUGAR DAILY 100 Strip  11   • LIFESCAN FINEPOINT LANCETS MISC USE TO TEST BLOOD SUGAR DAILY 100 Each 11   • fluticasone (FLONASE) 50 MCG/ACT nasal spray USE ONE SPRAY IN EACH NOSTRIL DAILY 1 Bottle 5   • NON SPECIFIED by Other route. 1. Lancettes. Use daily #100 RF x 1 year  2. Glucometer test strips. Use daily #100 RF x 1 year.  Dx: 250.00 100 Each 1   • cetirizine (ZYRTEC) 10 MG TABS Take 10 mg by mouth every day.       • vitamin D (CHOLECALCIFEROL) 1000 UNIT TABS Take 1,000 Units by mouth every day.     • doxycycline monohydrate (ADOXA) 100 MG tablet Take 1 Tab by mouth 2 times a day. (Patient not taking: Reported on 2/26/2019) 20 Tab 0     No current facility-administered medications for this visit.        She  has a past medical history of Abnormal stress electrocardiogram test using treadmill (8/11/2014); Allergic rhinitis (4/19/2010); Arthritis; Asthma, exogenous (6/11/2009); Bilateral ocular hypertension (7/6/2015); Cataract; Cervical High Risk HPV Test Positive (10/18/2006); COPD (chronic obstructive pulmonary disease) ([facility]) (6/11/2009); DDD (degenerative disc disease), lumbar (8/14/2013); Depression (6/11/2009); Emphysema of lung ([facility]); Glaucoma; Glaucoma suspect (6/11/2009); HDL deficiency (3/30/2012); Heart burn; History of Gout when on HCTZ (6/11/2009); Hyperlipidemia LDL goal < 100 (6/11/2009); Hyperplastic colon polyp (12/7/2007); Hypertension; Hypertriglyceridemia (3/30/2012); Personal history of allergy to eggs (4/2/2010); Posterior vitreous detachment of right eye (7/6/2015); Seizure ([facility]) (1953); Type II or unspecified type diabetes mellitus without mention of complication, not stated as uncontrolled; and Vitamin d deficiency (4/2/2010). She also has no past medical history of Encounter for long-term (current) use of other medications.    Amlodipine; Pcn [penicillins]; Sulfa drugs; and Toprol xl [metoprolol]    She  reports that she quit smoking about 24 years ago. Her smoking use included Cigarettes. She started  "smoking about 53 years ago. She has a 29.00 pack-year smoking history. She has never used smokeless tobacco. She reports that she drinks alcohol. She reports that she uses drugs, including Marijuana.  Counseling given: Not Answered      ROS   No chest pain, no chest pain, no abdominal pain.     Objective:     Physical Exam:  Blood pressure 112/58, pulse 71, temperature 36.4 °C (97.6 °F), temperature source Temporal, resp. rate 14, height 1.549 m (5' 1\"), weight 59 kg (130 lb), last menstrual period 01/01/2000, SpO2 97 %, not currently breastfeeding. Body mass index is 24.56 kg/m².  Constitutional: Alert, no distress.  Skin: Warm, dry, good turgor, no rashes in visible areas.  Eye: Equal, round and reactive, conjunctiva clear, lids normal.  ENMT: Lips without lesions, good dentition, oropharynx clear.  Neck: Trachea midline, no masses, no thyromegaly. No cervical or supraclavicular lymphadenopathy.  Respiratory: Unlabored respiratory effort, lungs clear to auscultation, no wheezes, no rhonchi.  Cardiovascular: Normal S1, S2, no murmur, no edema.  Abdomen: Soft, non-tender, no masses, no hepatosplenomegaly.  Psych: Alert and oriented x3, normal affect and mood.    Assessment and Plan:     1. Type 2 diabetes mellitus without complication, without long-term current use of insulin (HCC)  Chronic, uncontrolled.  I am worried about hypoglycemia that may be caused both by her Lantus and her glipizide.  We discussed stopping her Lantus completely but she is concerned about doing so.  She is also concerned about side effects of starting new medications like Trulicity which we did discuss today.  For now, I am going to have her keep a daily blood sugar log, we are decreasing her Lantus down to 5 units, and if she has any further hypoglycemia we are also going to decrease her glipizide.  We also discussed splitting her metformin to 1000 mg in the morning and 1000 mg at night to see if this will help with her diarrhea.  We " discussed that her increase in her hemoglobin A1c may be because she is not taking her medications the days after she has hypoglycemia or diarrhea.  - POCT Hemoglobin A1C    2. Major depressive disorder with single episode, in partial remission (HCC)  Chronic, stable on current regimen as above without side effects    3. Chronic obstructive pulmonary disease, unspecified COPD type (HCC)  Chronic, stable on Dulera without need for albuterol often.    4. Hypoglycemia  New, uncontrolled.  We are decreasing Lantus.  We may also need to decrease glipizide.  Strict blood sugar control discussed and keeping a log.  She will call for any further episodes.    5. Diarrhea, unspecified type  New, discussed potential causes including hypoglycemia, infectious causes, diverticulitis.  No current symptoms today no abdominal pain today.  We are changing her medication regimen as above to see if this will help.  Follow-up in 1 month or sooner for worsening symptoms.      Discussion today about general wellness and lifestyle habits:    · Engage in regular physical activity and social activities.  · Prevent falls and reduce trip hazards; using ambulatory aides, hearing and vision testing if appropriate.  · Steps to improve urinary incontinence.  · Advanced care planning.    Follow-Up: Return in about 4 weeks (around 3/26/2019) for DM.         PLEASE NOTE: This dictation was created using voice recognition software. I have made every reasonable attempt to correct obvious errors, but I expect that there are errors of grammar and possibly content that I did not discover before finalizing the note.

## 2019-03-06 ENCOUNTER — PATIENT OUTREACH (OUTPATIENT)
Dept: HEALTH INFORMATION MANAGEMENT | Facility: OTHER | Age: 74
End: 2019-03-06

## 2019-03-06 NOTE — PROGRESS NOTES
Outcome: Left Message    Please transfer to Patient Outreach Team at [phone] when patient returns call.    WebIZ Checked & Epic Updated:  no    [facility] Verified: yes    Attempt # 1

## 2019-03-08 ENCOUNTER — OFFICE VISIT (OUTPATIENT)
Dept: CARDIOLOGY | Facility: MEDICAL CENTER | Age: 74
End: 2019-03-08
Payer: MEDICARE

## 2019-03-08 VITALS
HEIGHT: 61 IN | SYSTOLIC BLOOD PRESSURE: 116 MMHG | WEIGHT: 130 LBS | HEART RATE: 70 BPM | OXYGEN SATURATION: 95 % | DIASTOLIC BLOOD PRESSURE: 74 MMHG | BODY MASS INDEX: 24.55 KG/M2

## 2019-03-08 DIAGNOSIS — I10 HTN (HYPERTENSION), MALIGNANT: ICD-10-CM

## 2019-03-08 DIAGNOSIS — J44.9 CHRONIC OBSTRUCTIVE PULMONARY DISEASE, UNSPECIFIED COPD TYPE (HCC): ICD-10-CM

## 2019-03-08 DIAGNOSIS — Z82.49 FAMILY HISTORY OF EARLY CAD: ICD-10-CM

## 2019-03-08 DIAGNOSIS — Z79.899 HIGH RISK MEDICATION USE: ICD-10-CM

## 2019-03-08 DIAGNOSIS — E11.65 TYPE 2 DIABETES MELLITUS WITH HYPERGLYCEMIA, WITH LONG-TERM CURRENT USE OF INSULIN (HCC): ICD-10-CM

## 2019-03-08 DIAGNOSIS — Z79.4 TYPE 2 DIABETES MELLITUS WITH HYPERGLYCEMIA, WITH LONG-TERM CURRENT USE OF INSULIN (HCC): ICD-10-CM

## 2019-03-08 PROCEDURE — 99214 OFFICE O/P EST MOD 30 MIN: CPT | Performed by: INTERNAL MEDICINE

## 2019-03-08 RX ORDER — LISINOPRIL 40 MG/1
40 TABLET ORAL DAILY
Qty: 90 TAB | Refills: 3 | Status: SHIPPED | OUTPATIENT
Start: 2019-03-08 | End: 2019-03-28 | Stop reason: SDUPTHER

## 2019-03-08 RX ORDER — ROSUVASTATIN CALCIUM 10 MG/1
TABLET, COATED ORAL
Qty: 90 TAB | Refills: 3 | Status: SHIPPED | OUTPATIENT
Start: 2019-03-08 | End: 2019-03-19 | Stop reason: SDUPTHER

## 2019-03-08 RX ORDER — SPIRONOLACTONE 25 MG/1
25 TABLET ORAL DAILY
Qty: 90 TAB | Refills: 3 | Status: SHIPPED | OUTPATIENT
Start: 2019-03-08 | End: 2019-09-09 | Stop reason: SDUPTHER

## 2019-03-08 RX ORDER — DILTIAZEM HYDROCHLORIDE 300 MG/1
300 CAPSULE, COATED, EXTENDED RELEASE ORAL DAILY
Qty: 90 CAP | Refills: 3 | Status: SHIPPED | OUTPATIENT
Start: 2019-03-08 | End: 2019-09-09 | Stop reason: SDUPTHER

## 2019-03-08 ASSESSMENT — ENCOUNTER SYMPTOMS
BLOOD IN STOOL: 0
HALLUCINATIONS: 0
VOMITING: 0
DOUBLE VISION: 0
DIZZINESS: 0
FEVER: 0
CLAUDICATION: 0
FALLS: 0
SPEECH CHANGE: 0
HEADACHES: 0
EYE PAIN: 0
SHORTNESS OF BREATH: 0
NAUSEA: 0
BRUISES/BLEEDS EASILY: 0
LOSS OF CONSCIOUSNESS: 0
EYE DISCHARGE: 0
SENSORY CHANGE: 0
WEIGHT LOSS: 0
ORTHOPNEA: 0
COUGH: 0
BLURRED VISION: 0
CHILLS: 0
MYALGIAS: 0
PND: 0
DEPRESSION: 0
ABDOMINAL PAIN: 0
PALPITATIONS: 0

## 2019-03-08 NOTE — PROGRESS NOTES
"Chief Complaint   Patient presents with   • Hypertension     F/V: 8 MO       Subjective:   Kenia Oconnell is a 73 y.o. female who presents today for cardiac care of CAD, HTN, HLP, DM.  In the interim, patient has been doing well without having any symptoms. Patient denies having chest pain, dyspnea, palpitation, presyncope, syncope episodes. Able to climb up at least 2 flights of stairs.     I have independently interpreted and reviewed blood tests results with patient in clinic which shows normal LDL level 60, triglycerides, renal and liver function.     Blood pressure is well controlled.    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   • Cataract     bilat   • Cervical High Risk HPV Test Positive 10/18/2006   • COPD (chronic obstructive pulmonary disease) ([facility]) 6/11/2009   • DDD (degenerative disc disease), lumbar 8/14/2013   • Depression 6/11/2009   • Emphysema of lung ([facility])    • Glaucoma    • Glaucoma suspect 6/11/2009   • HDL deficiency 3/30/2012   • Heart burn    • History of Gout when on HCTZ 6/11/2009   • Hyperlipidemia LDL goal < 100 6/11/2009   • Hyperplastic colon polyp 12/7/2007   • Hypertension    • Hypertriglyceridemia 3/30/2012   • Personal history of allergy to eggs 4/2/2010   • Posterior vitreous detachment of right eye 7/6/2015   • Seizure ([facility]) 1953    \"sun stroke\"   • Type II or unspecified type diabetes mellitus without mention of complication, not stated as uncontrolled     oral meds and insulin   • Vitamin d deficiency 4/2/2010     Past Surgical History:   Procedure Laterality Date   • CATARACT PHACO WITH IOL Right 11/7/2017    Procedure: CATARACT PHACO WITH IOL;  Surgeon: Carlos Arce M.D.;  Location: SURGERY SAME DAY [facility] [place];  Service: Ophthalmology   • CATARACT PHACO WITH IOL Left 10/17/2017    " "Procedure: CATARACT PHACO WITH IOL;  Surgeon: Carlos Arce M.D.;  Location: SURGERY SAME DAY [facility];  Service: Ophthalmology   • EYE SURGERY Bilateral 2013    Dr. Garrido   • SINUSOTOMIES  1999   • EYE SURGERY  1997    \"laser for eye pressure\"   • DILATION AND CURETTAGE  1981   • TONSILLECTOMY  1953     Family History   Problem Relation Age of Onset   • Heart Disease Father         d. MI 50s   • Hypertension Father    • Heart Attack Father    • Lung Disease Mother         d. lung dz   • Hypertension Mother    • Cancer Paternal Grandfather         Black Lung   • Diabetes Paternal Grandmother    • Heart Disease Sister 59        mi and CVA -    • Stroke Sister    • Heart Attack Brother    • Diabetes Other         Paternal Cousin DM1     Social History     Social History   • Marital status:      Spouse name: N/A   • Number of children: 1   • Years of education: N/A     Occupational History   • Not on file.     Social History Main Topics   • Smoking status: Former Smoker     Packs/day: 1.00     Years: 29.00     Types: Cigarettes     Start date: 1/1/1966     Quit date: 1/1/1995   • Smokeless tobacco: Never Used   • Alcohol use 0.0 oz/week      Comment: 1 glass occasionally   • Drug use: Yes     Types: Marijuana   • Sexual activity: Yes     Partners: Male     Other Topics Concern   • Not on file     Social History Narrative    2013. . Has sig other for many years.      Allergies   Allergen Reactions   • Amlodipine Swelling     LE edema   • Pcn [Penicillins] Rash     .   • Sulfa Drugs Rash     .   • Toprol Xl [Metoprolol]      Fatigue       Outpatient Encounter Prescriptions as of 3/8/2019   Medication Sig Dispense Refill   • DILTIAZem CD (CARTIA XT) 300 MG CAPSULE SR 24 HR Take 1 Cap by mouth every day. 90 Cap 3   • rosuvastatin (CRESTOR) 10 MG Tab TAKE ONE TABLET BY MOUTH DAILY 90 Tab 3   • spironolactone (ALDACTONE) 25 MG Tab Take 1 Tab by mouth every day. 90 Tab 3   • lisinopril (PRINIVIL, ZESTRIL) " 40 MG tablet Take 1 Tab by mouth every day. 90 Tab 3   • clobetasol (TEMOVATE) 0.05 % Ointment Apply 1 Application to affected area(s) 2 times a day. 60 g 11   • JANUVIA 100 MG Tab TAKE ONE TABLET BY MOUTH DAILY 90 Tab 2   • DULERA 100-5 MCG/ACT Aerosol INHALE ONE PUFF BY MOUTH TWICE A DAY 26 g 2   • insulin glargine (LANTUS SOLOSTAR) 100 UNIT/ML Solution Pen-injector injection Inject 15 Units as instructed every evening. (Patient taking differently: Inject 7 Units as instructed every evening.) 15 mL 5   • metFORMIN ER (GLUCOPHAGE XR) 500 MG TABLET SR 24 HR TAKE 4 TABLETS BY MOUTH EVERY DAY.  360 Tab 3   • Insulin Pen Needle 32G X 4 MM Misc Using one per day with Lantus injection 90 Each 3   • aspirin EC (ECOTRIN) 81 MG Tablet Delayed Response Take 81 mg by mouth every day.     • sertraline (ZOLOFT) 100 MG Tab TAKE 1 TABLET BY MOUTH DAILY. 90 Tab 3   • glipiZIDE (GLUCOTROL) 10 MG Tab Take 1 Tab by mouth 2 times a day. 180 Tab 3   • Polyethyl Glycol-Propyl Glycol (SYSTANE FREE OP) by Ophthalmic route.     • glucose blood (ONE TOUCH ULTRA TEST) strip USE TO TEST BLOOD SUGAR DAILY 100 Strip 11   • LIFESCAN FINEPOINT LANCETS MISC USE TO TEST BLOOD SUGAR DAILY 100 Each 11   • fluticasone (FLONASE) 50 MCG/ACT nasal spray USE ONE SPRAY IN EACH NOSTRIL DAILY 1 Bottle 5   • NON SPECIFIED by Other route. 1. Lancettes. Use daily #100 RF x 1 year  2. Glucometer test strips. Use daily #100 RF x 1 year.  Dx: 250.00 100 Each 1   • cetirizine (ZYRTEC) 10 MG TABS Take 10 mg by mouth every day.       • vitamin D (CHOLECALCIFEROL) 1000 UNIT TABS Take 1,000 Units by mouth every day.     • albuterol 108 (90 Base) MCG/ACT Aero Soln inhalation aerosol Inhale 1-2 Puffs by mouth every 6 hours as needed for Shortness of Breath. 1 Inhaler 3   • [DISCONTINUED] doxycycline monohydrate (ADOXA) 100 MG tablet Take 1 Tab by mouth 2 times a day. (Patient not taking: Reported on 2/26/2019) 20 Tab 0   • [DISCONTINUED] CARTIA  MG CAPSULE SR 24 HR  "TAKE ONE CAPSULE BY MOUTH DAILY 90 Cap 0   • cyclobenzaprine (FLEXERIL) 10 MG Tab TAKE 1/2 TO 1 TABLET AT BEDTIME AS NEEDED FOR PAIN/SPASMS 90 Tab 3   • [DISCONTINUED] spironolactone (ALDACTONE) 25 MG Tab Take 1 Tab by mouth every day. 30 Tab 11   • [DISCONTINUED] rosuvastatin (CRESTOR) 10 MG Tab TAKE ONE TABLET BY MOUTH DAILY 90 Tab 3   • [DISCONTINUED] lisinopril (PRINIVIL, ZESTRIL) 40 MG tablet Take 1 Tab by mouth every day. 30 Tab 11     No facility-administered encounter medications on file as of 3/8/2019.      Review of Systems   Constitutional: Negative for chills, fever, malaise/fatigue and weight loss.   HENT: Negative for ear discharge, ear pain, hearing loss and nosebleeds.    Eyes: Negative for blurred vision, double vision, pain and discharge.   Respiratory: Negative for cough and shortness of breath.    Cardiovascular: Negative for chest pain, palpitations, orthopnea, claudication, leg swelling and PND.   Gastrointestinal: Negative for abdominal pain, blood in stool, melena, nausea and vomiting.   Genitourinary: Negative for dysuria and hematuria.   Musculoskeletal: Negative for falls, joint pain and myalgias.   Skin: Negative for itching and rash.   Neurological: Negative for dizziness, sensory change, speech change, loss of consciousness and headaches.   Endo/Heme/Allergies: Negative for environmental allergies. Does not bruise/bleed easily.   Psychiatric/Behavioral: Negative for depression, hallucinations and suicidal ideas.        Objective:   /74 (BP Location: Right arm, Patient Position: Sitting, BP Cuff Size: Adult)   Pulse 70   Ht 1.549 m (5' 1\")   Wt 59 kg (130 lb)   LMP 01/01/2000   SpO2 95%   BMI 24.56 kg/m²     Physical Exam   Constitutional: She is oriented to person, place, and time. No distress.   HENT:   Head: Normocephalic and atraumatic.   Right Ear: External ear normal.   Left Ear: External ear normal.   Eyes: Right eye exhibits no discharge. Left eye exhibits no " discharge.   Neck: No JVD present. No thyromegaly present.   Cardiovascular: Normal rate, regular rhythm, normal heart sounds and intact distal pulses.  Exam reveals no gallop and no friction rub.    No murmur heard.  Pulmonary/Chest: Breath sounds normal. No respiratory distress.   Abdominal: Bowel sounds are normal. She exhibits no distension. There is no tenderness.   Musculoskeletal: She exhibits no edema or tenderness.   Neurological: She is alert and oriented to person, place, and time. No cranial nerve deficit.   Skin: Skin is warm and dry. She is not diaphoretic.   Psychiatric: She has a normal mood and affect. Her behavior is normal.   Nursing note and vitals reviewed.      Assessment:     1. HTN (hypertension), malignant  DILTIAZem CD (CARTIA XT) 300 MG CAPSULE SR 24 HR    spironolactone (ALDACTONE) 25 MG Tab    lisinopril (PRINIVIL, ZESTRIL) 40 MG tablet    EC-ECHOCARDIOGRAM COMPLETE W/O CONT    Comp Metabolic Panel    LIPID PANEL   2. Family history of early CAD  rosuvastatin (CRESTOR) 10 MG Tab    EC-ECHOCARDIOGRAM COMPLETE W/O CONT    Comp Metabolic Panel    LIPID PANEL   3. Type 2 diabetes mellitus with hyperglycemia, with long-term current use of insulin (HCC)  EC-ECHOCARDIOGRAM COMPLETE W/O CONT    Comp Metabolic Panel    LIPID PANEL   4. Chronic obstructive pulmonary disease, unspecified COPD type (HCC)  EC-ECHOCARDIOGRAM COMPLETE W/O CONT    Comp Metabolic Panel    LIPID PANEL   5. High risk medication use  Comp Metabolic Panel    LIPID PANEL       Medical Decision Making:  Today's Assessment / Status / Plan:   At this time patient is clinically stable in terms of her cardiac standpoint.  Cont current medications at current dose.   I will order transthoracic echocardiogram to assess for structural abnormalities.    I will see patient back in clinic with lab tests and studies results in 6 months.    I thank you for referring patient to our Cardiology Clinic today.

## 2019-03-08 NOTE — LETTER
"     [facility]   [address], [address]  [place] [address]  Phone: [phone]  Fax: [phone]              Kenia Oconnell  1945    Encounter Date: 3/8/2019    Elyse Flores M.D.          PROGRESS NOTE:  Chief Complaint   Patient presents with   • Hypertension     F/V: 8 MO       Subjective:   Kenia Oconnell is a 73 y.o. female who presents today for cardiac care of CAD, HTN, HLP, DM.  In the interim, patient has been doing well without having any symptoms. Patient denies having chest pain, dyspnea, palpitation, presyncope, syncope episodes. Able to climb up at least 2 flights of stairs.     I have independently interpreted and reviewed blood tests results with patient in clinic which shows normal LDL level 60, triglycerides, renal and liver function.     Blood pressure is well controlled.    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   • Cataract     bilat   • Cervical High Risk HPV Test Positive 10/18/2006   • COPD (chronic obstructive pulmonary disease) (HCC) 6/11/2009   • DDD (degenerative disc disease), lumbar 8/14/2013   • Depression 6/11/2009   • Emphysema of lung ([facility])    • Glaucoma    • Glaucoma suspect 6/11/2009   • HDL deficiency 3/30/2012   • Heart burn    • History of Gout when on HCTZ 6/11/2009   • Hyperlipidemia LDL goal < 100 6/11/2009   • Hyperplastic colon polyp 12/7/2007   • Hypertension    • Hypertriglyceridemia 3/30/2012   • Personal history of allergy to eggs 4/2/2010   • Posterior vitreous detachment of right eye 7/6/2015   • Seizure (HCC) 1953    \"sun stroke\"   • Type II or unspecified type diabetes mellitus without mention of complication, not stated as uncontrolled     oral meds and insulin   • Vitamin d deficiency 4/2/2010     Past " "Surgical History:   Procedure Laterality Date   • CATARACT PHACO WITH IOL Right 11/7/2017    Procedure: CATARACT PHACO WITH IOL;  Surgeon: Carlos Arce M.D.;  Location: SURGERY SAME DAY [facility];  Service: Ophthalmology   • CATARACT PHACO WITH IOL Left 10/17/2017    Procedure: CATARACT PHACO WITH IOL;  Surgeon: Carlos Arce M.D.;  Location: SURGERY SAME DAY [facility];  Service: Ophthalmology   • EYE SURGERY Bilateral 2013    Dr. Garrido   • SINUSOTOMIES  1999   • EYE SURGERY  1997    \"laser for eye pressure\"   • DILATION AND CURETTAGE  1981   • TONSILLECTOMY  1953     Family History   Problem Relation Age of Onset   • Heart Disease Father         d. MI 50s   • Hypertension Father    • Heart Attack Father    • Lung Disease Mother         d. lung dz   • Hypertension Mother    • Cancer Paternal Grandfather         Black Lung   • Diabetes Paternal Grandmother    • Heart Disease Sister 59        mi and CVA -    • Stroke Sister    • Heart Attack Brother    • Diabetes Other         Paternal Cousin DM1     Social History     Social History   • Marital status:      Spouse name: N/A   • Number of children: 1   • Years of education: N/A     Occupational History   • Not on file.     Social History Main Topics   • Smoking status: Former Smoker     Packs/day: 1.00     Years: 29.00     Types: Cigarettes     Start date: 1/1/1966     Quit date: 1/1/1995   • Smokeless tobacco: Never Used   • Alcohol use 0.0 oz/week      Comment: 1 glass occasionally   • Drug use: Yes     Types: Marijuana   • Sexual activity: Yes     Partners: Male     Other Topics Concern   • Not on file     Social History Narrative    2013. . Has sig other for many years.      Allergies   Allergen Reactions   • Amlodipine Swelling     LE edema   • Pcn [Penicillins] Rash     .   • Sulfa Drugs Rash     .   • Toprol Xl [Metoprolol]      Fatigue       Outpatient Encounter Prescriptions as of 3/8/2019   Medication Sig Dispense Refill   • " DILTIAZem CD (CARTIA XT) 300 MG CAPSULE SR 24 HR Take 1 Cap by mouth every day. 90 Cap 3   • rosuvastatin (CRESTOR) 10 MG Tab TAKE ONE TABLET BY MOUTH DAILY 90 Tab 3   • spironolactone (ALDACTONE) 25 MG Tab Take 1 Tab by mouth every day. 90 Tab 3   • lisinopril (PRINIVIL, ZESTRIL) 40 MG tablet Take 1 Tab by mouth every day. 90 Tab 3   • clobetasol (TEMOVATE) 0.05 % Ointment Apply 1 Application to affected area(s) 2 times a day. 60 g 11   • JANUVIA 100 MG Tab TAKE ONE TABLET BY MOUTH DAILY 90 Tab 2   • DULERA 100-5 MCG/ACT Aerosol INHALE ONE PUFF BY MOUTH TWICE A DAY 26 g 2   • insulin glargine (LANTUS SOLOSTAR) 100 UNIT/ML Solution Pen-injector injection Inject 15 Units as instructed every evening. (Patient taking differently: Inject 7 Units as instructed every evening.) 15 mL 5   • metFORMIN ER (GLUCOPHAGE XR) 500 MG TABLET SR 24 HR TAKE 4 TABLETS BY MOUTH EVERY DAY.  360 Tab 3   • Insulin Pen Needle 32G X 4 MM Misc Using one per day with Lantus injection 90 Each 3   • aspirin EC (ECOTRIN) 81 MG Tablet Delayed Response Take 81 mg by mouth every day.     • sertraline (ZOLOFT) 100 MG Tab TAKE 1 TABLET BY MOUTH DAILY. 90 Tab 3   • glipiZIDE (GLUCOTROL) 10 MG Tab Take 1 Tab by mouth 2 times a day. 180 Tab 3   • Polyethyl Glycol-Propyl Glycol (SYSTANE FREE OP) by Ophthalmic route.     • glucose blood (ONE TOUCH ULTRA TEST) strip USE TO TEST BLOOD SUGAR DAILY 100 Strip 11   • LIFESCAN FINEPOINT LANCETS MISC USE TO TEST BLOOD SUGAR DAILY 100 Each 11   • fluticasone (FLONASE) 50 MCG/ACT nasal spray USE ONE SPRAY IN EACH NOSTRIL DAILY 1 Bottle 5   • NON SPECIFIED by Other route. 1. Lancettes. Use daily #100 RF x 1 year  2. Glucometer test strips. Use daily #100 RF x 1 year.  Dx: 250.00 100 Each 1   • cetirizine (ZYRTEC) 10 MG TABS Take 10 mg by mouth every day.       • vitamin D (CHOLECALCIFEROL) 1000 UNIT TABS Take 1,000 Units by mouth every day.     • albuterol 108 (90 Base) MCG/ACT Aero Soln inhalation aerosol Inhale 1-2  "Puffs by mouth every 6 hours as needed for Shortness of Breath. 1 Inhaler 3   • [DISCONTINUED] doxycycline monohydrate (ADOXA) 100 MG tablet Take 1 Tab by mouth 2 times a day. (Patient not taking: Reported on 2/26/2019) 20 Tab 0   • [DISCONTINUED] CARTIA  MG CAPSULE SR 24 HR TAKE ONE CAPSULE BY MOUTH DAILY 90 Cap 0   • cyclobenzaprine (FLEXERIL) 10 MG Tab TAKE 1/2 TO 1 TABLET AT BEDTIME AS NEEDED FOR PAIN/SPASMS 90 Tab 3   • [DISCONTINUED] spironolactone (ALDACTONE) 25 MG Tab Take 1 Tab by mouth every day. 30 Tab 11   • [DISCONTINUED] rosuvastatin (CRESTOR) 10 MG Tab TAKE ONE TABLET BY MOUTH DAILY 90 Tab 3   • [DISCONTINUED] lisinopril (PRINIVIL, ZESTRIL) 40 MG tablet Take 1 Tab by mouth every day. 30 Tab 11     No facility-administered encounter medications on file as of 3/8/2019.      Review of Systems   Constitutional: Negative for chills, fever, malaise/fatigue and weight loss.   HENT: Negative for ear discharge, ear pain, hearing loss and nosebleeds.    Eyes: Negative for blurred vision, double vision, pain and discharge.   Respiratory: Negative for cough and shortness of breath.    Cardiovascular: Negative for chest pain, palpitations, orthopnea, claudication, leg swelling and PND.   Gastrointestinal: Negative for abdominal pain, blood in stool, melena, nausea and vomiting.   Genitourinary: Negative for dysuria and hematuria.   Musculoskeletal: Negative for falls, joint pain and myalgias.   Skin: Negative for itching and rash.   Neurological: Negative for dizziness, sensory change, speech change, loss of consciousness and headaches.   Endo/Heme/Allergies: Negative for environmental allergies. Does not bruise/bleed easily.   Psychiatric/Behavioral: Negative for depression, hallucinations and suicidal ideas.        Objective:   /74 (BP Location: Right arm, Patient Position: Sitting, BP Cuff Size: Adult)   Pulse 70   Ht 1.549 m (5' 1\")   Wt 59 kg (130 lb)   LMP 01/01/2000   SpO2 95%   BMI 24.56 " kg/m²      Physical Exam   Constitutional: She is oriented to person, place, and time. No distress.   HENT:   Head: Normocephalic and atraumatic.   Right Ear: External ear normal.   Left Ear: External ear normal.   Eyes: Right eye exhibits no discharge. Left eye exhibits no discharge.   Neck: No JVD present. No thyromegaly present.   Cardiovascular: Normal rate, regular rhythm, normal heart sounds and intact distal pulses.  Exam reveals no gallop and no friction rub.    No murmur heard.  Pulmonary/Chest: Breath sounds normal. No respiratory distress.   Abdominal: Bowel sounds are normal. She exhibits no distension. There is no tenderness.   Musculoskeletal: She exhibits no edema or tenderness.   Neurological: She is alert and oriented to person, place, and time. No cranial nerve deficit.   Skin: Skin is warm and dry. She is not diaphoretic.   Psychiatric: She has a normal mood and affect. Her behavior is normal.   Nursing note and vitals reviewed.      Assessment:     1. HTN (hypertension), malignant  DILTIAZem CD (CARTIA XT) 300 MG CAPSULE SR 24 HR    spironolactone (ALDACTONE) 25 MG Tab    lisinopril (PRINIVIL, ZESTRIL) 40 MG tablet    EC-ECHOCARDIOGRAM COMPLETE W/O CONT    Comp Metabolic Panel    LIPID PANEL   2. Family history of early CAD  rosuvastatin (CRESTOR) 10 MG Tab    EC-ECHOCARDIOGRAM COMPLETE W/O CONT    Comp Metabolic Panel    LIPID PANEL   3. Type 2 diabetes mellitus with hyperglycemia, with long-term current use of insulin ([facility])  EC-ECHOCARDIOGRAM COMPLETE W/O CONT    Comp Metabolic Panel    LIPID PANEL   4. Chronic obstructive pulmonary disease, unspecified COPD type ([facility])  EC-ECHOCARDIOGRAM COMPLETE W/O CONT    Comp Metabolic Panel    LIPID PANEL   5. High risk medication use  Comp Metabolic Panel    LIPID PANEL       Medical Decision Making:  Today's Assessment / Status / Plan:   At this time patient is clinically stable in terms of her cardiac standpoint.  Cont current medications at current  dose.   I will order transthoracic echocardiogram to assess for structural abnormalities.    I will see patient back in clinic with lab tests and studies results in 6 months.    I thank you for referring patient to our Cardiology Clinic today.        Kenia Parr M.D.  [address] [address]  VIA In Basket

## 2019-03-19 DIAGNOSIS — Z82.49 FAMILY HISTORY OF EARLY CAD: ICD-10-CM

## 2019-03-19 RX ORDER — ROSUVASTATIN CALCIUM 10 MG/1
TABLET, COATED ORAL
Qty: 100 TAB | Refills: 3 | Status: SHIPPED | OUTPATIENT
Start: 2019-03-19 | End: 2019-09-09 | Stop reason: SDUPTHER

## 2019-03-26 ENCOUNTER — OFFICE VISIT (OUTPATIENT)
Dept: MEDICAL GROUP | Facility: LAB | Age: 74
End: 2019-03-26
Payer: MEDICARE

## 2019-03-26 VITALS
HEART RATE: 73 BPM | WEIGHT: 128 LBS | HEIGHT: 61 IN | OXYGEN SATURATION: 95 % | TEMPERATURE: 97.7 F | SYSTOLIC BLOOD PRESSURE: 110 MMHG | DIASTOLIC BLOOD PRESSURE: 64 MMHG | BODY MASS INDEX: 24.17 KG/M2 | RESPIRATION RATE: 14 BRPM

## 2019-03-26 DIAGNOSIS — R19.7 DIARRHEA, UNSPECIFIED TYPE: ICD-10-CM

## 2019-03-26 DIAGNOSIS — E11.9 TYPE 2 DIABETES MELLITUS WITHOUT COMPLICATION, WITHOUT LONG-TERM CURRENT USE OF INSULIN (HCC): ICD-10-CM

## 2019-03-26 DIAGNOSIS — E16.2 HYPOGLYCEMIA: ICD-10-CM

## 2019-03-26 PROCEDURE — 99214 OFFICE O/P EST MOD 30 MIN: CPT | Performed by: FAMILY MEDICINE

## 2019-03-26 ASSESSMENT — PATIENT HEALTH QUESTIONNAIRE - PHQ9: CLINICAL INTERPRETATION OF PHQ2 SCORE: 0

## 2019-03-27 NOTE — PROGRESS NOTES
Subjective:   Kenia Oconnell is a 73 y.o. female here today for   Chief Complaint   Patient presents with   • Follow-Up     DM       1. Type 2 diabetes mellitus without complication, without long-term current use of insulin ([facility])  2. Hypoglycemia  3. Diarrhea, unspecified type  At the last visit, patient was having episodes that sounded as though she was having hypoglycemia with shakiness, diarrhea, diaphoresis.  We decreased her Lantus by half, she is taking 7-8 units at nighttime and split her metformin into 2 doses.  She is overall feeling much better and states that the diarrhea has resolved she is no longer having any hypoglycemic episodes.  She has been monitoring her blood sugars and her lowest blood sugar was 88.  She was slightly symptomatic with these blood sugars.  This is chronic. Improved. Currently taking Lantus 78 units at nighttime, glipizide 10 mg twice daily, Januvia 100 mg daily, metformin 1000 mg twice daily as directed. She is also taking a daily aspirin, statin, and ACE-I/ARB.   Denies side effects or hypoglycemic events from medications.  Last HbA1c is 7.8%  She is monitoring blood sugar regularly at home. Fasting blood sugars are 140s  Reports diet is not changed  She is not exercising regularly.  Last eye exam: May 2018  Doing regular foot exams and care.  Denies polyuria, polydipsia, blurred vision, early satiety, or neuropathies.      Current medicines (including changes today)  Current Outpatient Prescriptions   Medication Sig Dispense Refill   • rosuvastatin (CRESTOR) 10 MG Tab TAKE ONE TABLET BY MOUTH DAILY 100 Tab 3   • DILTIAZem CD (CARTIA XT) 300 MG CAPSULE SR 24 HR Take 1 Cap by mouth every day. 90 Cap 3   • spironolactone (ALDACTONE) 25 MG Tab Take 1 Tab by mouth every day. 90 Tab 3   • lisinopril (PRINIVIL, ZESTRIL) 40 MG tablet Take 1 Tab by mouth every day. 90 Tab 3   • clobetasol (TEMOVATE) 0.05 % Ointment Apply 1 Application to affected area(s) 2 times a day. 60 g 11    • JANUVIA 100 MG Tab TAKE ONE TABLET BY MOUTH DAILY 90 Tab 2   • DULERA 100-5 MCG/ACT Aerosol INHALE ONE PUFF BY MOUTH TWICE A DAY 26 g 2   • insulin glargine (LANTUS SOLOSTAR) 100 UNIT/ML Solution Pen-injector injection Inject 15 Units as instructed every evening. (Patient taking differently: Inject 7 Units as instructed every evening.) 15 mL 5   • metFORMIN ER (GLUCOPHAGE XR) 500 MG TABLET SR 24 HR TAKE 4 TABLETS BY MOUTH EVERY DAY.  360 Tab 3   • Insulin Pen Needle 32G X 4 MM Misc Using one per day with Lantus injection 90 Each 3   • aspirin EC (ECOTRIN) 81 MG Tablet Delayed Response Take 81 mg by mouth every day.     • sertraline (ZOLOFT) 100 MG Tab TAKE 1 TABLET BY MOUTH DAILY. 90 Tab 3   • glipiZIDE (GLUCOTROL) 10 MG Tab Take 1 Tab by mouth 2 times a day. 180 Tab 3   • Polyethyl Glycol-Propyl Glycol (SYSTANE FREE OP) by Ophthalmic route.     • glucose blood (ONE TOUCH ULTRA TEST) strip USE TO TEST BLOOD SUGAR DAILY 100 Strip 11   • LIFESCAN FINEPOINT LANCETS MISC USE TO TEST BLOOD SUGAR DAILY 100 Each 11   • NON SPECIFIED by Other route. 1. Lancettes. Use daily #100 RF x 1 year  2. Glucometer test strips. Use daily #100 RF x 1 year.  Dx: 250.00 100 Each 1   • cetirizine (ZYRTEC) 10 MG TABS Take 10 mg by mouth every day.       • vitamin D (CHOLECALCIFEROL) 1000 UNIT TABS Take 1,000 Units by mouth every day.     • albuterol 108 (90 Base) MCG/ACT Aero Soln inhalation aerosol Inhale 1-2 Puffs by mouth every 6 hours as needed for Shortness of Breath. 1 Inhaler 3   • cyclobenzaprine (FLEXERIL) 10 MG Tab TAKE 1/2 TO 1 TABLET AT BEDTIME AS NEEDED FOR PAIN/SPASMS 90 Tab 3   • fluticasone (FLONASE) 50 MCG/ACT nasal spray USE ONE SPRAY IN EACH NOSTRIL DAILY (Patient not taking: Reported on 3/26/2019) 1 Bottle 5     No current facility-administered medications for this visit.      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); Cataract; Cervical High Risk HPV Test Positive (10/18/2006); COPD (chronic obstructive pulmonary disease) ([facility]) (6/11/2009); DDD (degenerative disc disease), lumbar (8/14/2013); Depression (6/11/2009); Emphysema of lung ([facility]); Glaucoma; Glaucoma suspect (6/11/2009); HDL deficiency (3/30/2012); Heart burn; History of Gout when on HCTZ (6/11/2009); Hyperlipidemia LDL goal < 100 (6/11/2009); Hyperplastic colon polyp (12/7/2007); Hypertension; Hypertriglyceridemia (3/30/2012); Personal history of allergy to eggs (4/2/2010); Posterior vitreous detachment of right eye (7/6/2015); Seizure ([facility]) (1953); Type II or unspecified type diabetes mellitus without mention of complication, not stated as uncontrolled; and Vitamin d deficiency (4/2/2010). She also has no past medical history of Encounter for long-term (current) use of other medications.    ROS   No fevers  No bowel changes  No LE edema       Objective:     Blood pressure 110/64, pulse 73, temperature 36.5 °C (97.7 °F), temperature source Temporal, resp. rate 14, height 1.549 m (5' 1\"), weight 58.1 kg (128 lb), last menstrual period 01/01/2000, SpO2 95 %, not currently breastfeeding. Body mass index is 24.19 kg/m².   Physical Exam:  Constitutional: Alert, no distress.  Skin: Warm, dry, good turgor, no rashes in visible areas.  Eye: Equal, round and reactive, conjunctiva clear, lids normal.  ENMT: Lips without lesions, good dentition, oropharynx clear.  Neck: Trachea midline, no masses, no thyromegaly. No cervical or supraclavicular lymphadenopathy  Respiratory: Unlabored respiratory effort, lungs clear to auscultation, no wheezes, no ronchi.  Cardiovascular: Normal S1, S2, RRR, no murmur, no edema.  Abdomen: Soft, non-tender, no masses, no hepatosplenomegaly.  Psych: Alert and oriented x3, normal affect and mood.      Assessment and Plan:   The following treatment plan was " discussed    1. Type 2 diabetes mellitus without complication, without long-term current use of insulin (HCC)  Chronic, stable, last hemoglobin A1c is 7.8%, we are going to try to get this down but at this point we are most concerned about her previous hypoglycemic episodes.  We have discussed Trulicity as a possible additional agent but at this point, she would like to see where she is in the next couple of months now that her hypoglycemia has resolved with a decrease in Lantus.  We discussed that her glipizide may also be causing hypoglycemic episodes.    2. Hypoglycemia  Chronic, now much improved, lowest blood sugar over the last month was 88 and she was symptomatic with this.  We discussed protein rich snacks, and potentially stopping Lantus completely although patient is concerned about this.  We will see what her hemoglobin A1c is in the next 2 months    3. Diarrhea, unspecified type  Chronic, now resolved, may have been related to hypoglycemic episodes or taking all of her metformin at one time.  Now that she has split her metformin into 2 doses she has much improved symptoms.      Followup: Return in about 2 months (around 5/26/2019) for DM RN Appt.       This note was created using voice recognition software. I have made every reasonable attempt to correct errors, however, I do anticipate some grammatical errors.

## 2019-03-28 DIAGNOSIS — I10 HTN (HYPERTENSION), MALIGNANT: ICD-10-CM

## 2019-03-28 RX ORDER — LISINOPRIL 40 MG/1
40 TABLET ORAL DAILY
Qty: 100 TAB | Refills: 2 | Status: SHIPPED | OUTPATIENT
Start: 2019-03-28 | End: 2019-09-09 | Stop reason: SDUPTHER

## 2019-04-05 ENCOUNTER — HOSPITAL ENCOUNTER (OUTPATIENT)
Dept: RADIOLOGY | Facility: MEDICAL CENTER | Age: 74
End: 2019-04-05
Attending: FAMILY MEDICINE
Payer: MEDICARE

## 2019-04-05 DIAGNOSIS — E28.39 ESTROGEN DEFICIENCY: ICD-10-CM

## 2019-04-05 DIAGNOSIS — Z12.31 ENCOUNTER FOR SCREENING MAMMOGRAM FOR BREAST CANCER: ICD-10-CM

## 2019-04-05 PROCEDURE — 77080 DXA BONE DENSITY AXIAL: CPT

## 2019-04-05 PROCEDURE — 77063 BREAST TOMOSYNTHESIS BI: CPT

## 2019-04-07 ENCOUNTER — PATIENT MESSAGE (OUTPATIENT)
Dept: MEDICAL GROUP | Facility: LAB | Age: 74
End: 2019-04-07

## 2019-04-08 RX ORDER — SERTRALINE HYDROCHLORIDE 100 MG/1
TABLET, FILM COATED ORAL
Qty: 90 TAB | Refills: 3 | Status: SHIPPED | OUTPATIENT
Start: 2019-04-08 | End: 2020-03-02 | Stop reason: SDUPTHER

## 2019-04-08 NOTE — PATIENT COMMUNICATION
Was the patient seen in the last year in this department? Yes LOV: 3/26/19    Does patient have an active prescription for medications requested? No     Received Request Via: Pharmacy

## 2019-04-08 NOTE — TELEPHONE ENCOUNTER
From: Kenia Oconnell  To: Kenia Parr M.D.  Sent: 4/7/2019 4:18 PM PDT  Subject: Prescription Question    Dr Parr,    Could I get a refill for sertraline/zoloft? I've looked in both my prescription databases, [facility] and [facility], and it has disappeared.    Many Thanks    Kenia Oconnell

## 2019-04-10 ENCOUNTER — HOSPITAL ENCOUNTER (OUTPATIENT)
Dept: CARDIOLOGY | Facility: MEDICAL CENTER | Age: 74
End: 2019-04-10
Attending: INTERNAL MEDICINE
Payer: MEDICARE

## 2019-04-10 DIAGNOSIS — J44.9 CHRONIC OBSTRUCTIVE PULMONARY DISEASE, UNSPECIFIED COPD TYPE (HCC): ICD-10-CM

## 2019-04-10 DIAGNOSIS — Z82.49 FAMILY HISTORY OF EARLY CAD: ICD-10-CM

## 2019-04-10 DIAGNOSIS — I10 HTN (HYPERTENSION), MALIGNANT: ICD-10-CM

## 2019-04-10 DIAGNOSIS — E11.65 TYPE 2 DIABETES MELLITUS WITH HYPERGLYCEMIA, WITH LONG-TERM CURRENT USE OF INSULIN (HCC): ICD-10-CM

## 2019-04-10 DIAGNOSIS — Z79.4 TYPE 2 DIABETES MELLITUS WITH HYPERGLYCEMIA, WITH LONG-TERM CURRENT USE OF INSULIN (HCC): ICD-10-CM

## 2019-04-10 LAB
LV EJECT FRACT  99904: 60
LV EJECT FRACT MOD 2C 99903: 69.26
LV EJECT FRACT MOD 4C 99902: 60.93
LV EJECT FRACT MOD BP 99901: 65.79

## 2019-04-10 PROCEDURE — 93306 TTE W/DOPPLER COMPLETE: CPT

## 2019-04-10 PROCEDURE — 93306 TTE W/DOPPLER COMPLETE: CPT | Mod: 26 | Performed by: INTERNAL MEDICINE

## 2019-04-11 NOTE — PROGRESS NOTES
Dear Tracey,    Can you please let Kenia Oconnell know that result is ok and I will see patient as scheduled?    Thanks Chaim Webb.

## 2019-04-16 DIAGNOSIS — E78.5 DM TYPE 2 WITH DIABETIC DYSLIPIDEMIA (HCC): ICD-10-CM

## 2019-04-16 DIAGNOSIS — E11.69 DM TYPE 2 WITH DIABETIC DYSLIPIDEMIA (HCC): ICD-10-CM

## 2019-04-16 RX ORDER — GLIPIZIDE 10 MG/1
10 TABLET ORAL 2 TIMES DAILY
Qty: 180 TAB | Refills: 3 | Status: SHIPPED | OUTPATIENT
Start: 2019-04-16 | End: 2019-06-19 | Stop reason: SDUPTHER

## 2019-04-16 NOTE — TELEPHONE ENCOUNTER
----- Message from Kenia Oconnell sent at 4/15/2019  8:08 PM PDT -----  Regarding: Prescription Question  Contact: [phone]  Dr Parr,    Could I get a new prescription for Glipizide?    Many thanks,    Kenia Oconnell

## 2019-04-16 NOTE — TELEPHONE ENCOUNTER
Was the patient seen in the last year in this department? Yes  3/26/19  Does patient have an active prescription for medications requested? No     Received Request Via: Patient

## 2019-05-07 ENCOUNTER — TELEPHONE (OUTPATIENT)
Dept: MEDICAL GROUP | Facility: LAB | Age: 74
End: 2019-05-07

## 2019-05-07 NOTE — TELEPHONE ENCOUNTER
Phone Number Called: [phone] (home)     Message: I left a voicemail and sent a Pixelle message that her appointment with Dr. Parr on 6/419 needs to be rescheduled with another provider as she is on maternity leave.    Left Message for patient to call back: yes

## 2019-06-11 ENCOUNTER — TELEPHONE (OUTPATIENT)
Dept: MEDICAL GROUP | Facility: LAB | Age: 74
End: 2019-06-11

## 2019-06-11 NOTE — TELEPHONE ENCOUNTER
ESTABLISHED PATIENT PRE-VISIT PLANNING     Patient was NOT contacted to complete PVP.     Note: Patient will not be contacted if there is no indication to call.     1.  Reviewed notes from the last few office visits within the medical group: Yes    2.  If any orders were placed at last visit or intended to be done for this visit (i.e. 6 mos follow-up), do we have Results/Consult Notes?        •  Labs - Labs ordered, completed on 2/26/19 and results are in chart.   Note: If patient appointment is for lab review and patient did not complete labs, check with provider if OK to reschedule patient until labs completed.       •  Imaging - Imaging was not ordered at last office visit.       •  Referrals - No referrals were ordered at last office visit.    3. Is this appointment scheduled as a Hospital Follow-Up? No    4.  Immunizations were updated in Epic using WebIZ?: Epic matches WebIZ       •  Web Iz Recommendations: SHINGRIX (Shingles)    5.  Patient is due for the following Health Maintenance Topics:   Health Maintenance Due   Topic Date Due   • HEPATITIS C SCREENING  1945   • IMM HEP B VACCINE (1 of 3 - Risk 3-dose series) 06/16/1966   • IMM ZOSTER VACCINES (2 of 3) 08/27/2010   • PFT SCREENING-FEV1 AND FEV/FVC RATIO / SPIROMETRY SHOULD BE PERFORMED ANNUALLY  05/19/2017   • URINE ACR / MICROALBUMIN  03/06/2019   • DIABETES MONOFILAMENT / LE EXAM  03/14/2019   • RETINAL SCREENING  05/02/2019       - Patient has completed FLU, PNEUMOVAX (PPSV23), PREVNAR (PCV13) , TDAP and SHINGRIX (Shingles) Immunization(s) per WebIZ. Chart has been updated.    6. Orders for overdue Health Maintenance topics pended in Pre-Charting? N\A    7.  AHA (MDX) form printed for Provider? No, already completed    8.  Patient was NOT informed to arrive 15 min prior to their scheduled appointment and bring in their medication bottles.

## 2019-06-19 ENCOUNTER — OFFICE VISIT (OUTPATIENT)
Dept: MEDICAL GROUP | Facility: LAB | Age: 74
End: 2019-06-19
Payer: MEDICARE

## 2019-06-19 VITALS
SYSTOLIC BLOOD PRESSURE: 110 MMHG | DIASTOLIC BLOOD PRESSURE: 52 MMHG | RESPIRATION RATE: 14 BRPM | BODY MASS INDEX: 23.6 KG/M2 | WEIGHT: 125 LBS | HEIGHT: 61 IN | HEART RATE: 84 BPM | TEMPERATURE: 98.5 F | OXYGEN SATURATION: 94 %

## 2019-06-19 DIAGNOSIS — Z79.4 TYPE 2 DIABETES MELLITUS WITH DIABETIC MONONEUROPATHY, WITH LONG-TERM CURRENT USE OF INSULIN (HCC): ICD-10-CM

## 2019-06-19 DIAGNOSIS — E11.41 TYPE 2 DIABETES MELLITUS WITH DIABETIC MONONEUROPATHY, WITH LONG-TERM CURRENT USE OF INSULIN (HCC): ICD-10-CM

## 2019-06-19 DIAGNOSIS — I10 ESSENTIAL HYPERTENSION: ICD-10-CM

## 2019-06-19 DIAGNOSIS — E78.5 HYPERLIPIDEMIA WITH TARGET LDL LESS THAN 100: ICD-10-CM

## 2019-06-19 PROBLEM — E11.9 TYPE 2 DIABETES MELLITUS WITHOUT COMPLICATION, WITHOUT LONG-TERM CURRENT USE OF INSULIN (HCC): Status: RESOLVED | Noted: 2018-10-03 | Resolved: 2019-06-19

## 2019-06-19 LAB
HBA1C MFR BLD: 6.6 % (ref 0–5.6)
INT CON NEG: NEGATIVE
INT CON POS: POSITIVE

## 2019-06-19 PROCEDURE — 99214 OFFICE O/P EST MOD 30 MIN: CPT | Performed by: NURSE PRACTITIONER

## 2019-06-19 PROCEDURE — 83036 HEMOGLOBIN GLYCOSYLATED A1C: CPT | Performed by: NURSE PRACTITIONER

## 2019-06-19 RX ORDER — GLIPIZIDE 5 MG/1
5 TABLET ORAL 2 TIMES DAILY
Qty: 4 TAB | Refills: 1
Start: 2019-06-19 | End: 2019-09-23

## 2019-06-19 NOTE — ASSESSMENT & PLAN NOTE
Chronic issue.  Taking lisinopril 40 mg, diltiazem 300mg and spironolactone daily.  BP well controlled at home.  Denies swelling in her ankles. No chest pain.  Quit smoking over 30 yrs ago.

## 2019-06-19 NOTE — ASSESSMENT & PLAN NOTE
Chronic issue. Compliant with rosuvastatin.  Having LP drawn before seeing Dr. Flores in Sept.  States diet is ok, not super healthy.  Not exercising.

## 2019-06-19 NOTE — LETTER
[facility]  Kenia Parr M.D.  [address]  [place] [address]  Fax: [phone]   Authorization for Release/Disclosure of   Protected Health Information   Name: KENIA FIORE : 1945 SSN: [id number]   Address[place] [facility] [address] [facility] [place] [address] Phone:    [phone] (home)    I authorize the entity listed below to release/disclose the PHI below to:   [facility]/Kenia Parr M.D. and KUNAL Augustin   Provider or Entity Name:  Italia Oliveros M.D.   Address   [facility], [facility]   Phone:      Fax:[phone]     Reason for request: continuity of care   Information to be released:    [  ] LAST COLONOSCOPY,  including any PATH REPORT and follow-up  [  ] LAST FIT/COLOGUARD RESULT [  ] LAST DEXA  [  ] LAST MAMMOGRAM  [  ] LAST PAP  [  ] LAST LABS [  ] RETINA EXAM REPORT  [  ] IMMUNIZATION RECORDS  [  ] Release all info      [  ] Check here and initial the line next to each item to release ALL health information INCLUDING  _____ Care and treatment for drug and / or alcohol abuse  _____ HIV testing, infection status, or AIDS  _____ Genetic Testing    DATES OF SERVICE OR TIME PERIOD TO BE DISCLOSED: _____________  I understand and acknowledge that:  * This Authorization may be revoked at any time by you in writing, except if your health information has already been used or disclosed.  * Your health information that will be used or disclosed as a result of you signing this authorization could be re-disclosed by the recipient. If this occurs, your re-disclosed health information may no longer be protected by State or Federal laws.  * You may refuse to sign this Authorization. Your refusal will not affect your ability to obtain treatment.  * This Authorization becomes effective upon signing and will  on (date) __________.      If no date is indicated, this Authorization will  one (1) year from the signature date.    Name: Kenia Boucher  Anish    Signature:   Date:     6/19/2019       PLEASE FAX REQUESTED RECORDS BACK TO: [phone]

## 2019-06-19 NOTE — ASSESSMENT & PLAN NOTE
Chronic issue.  A1C has dropped from 7.8 - 6.6 over the past 4 mo.  BG at home - 130's.  Had one episode of hypoglycemia manifested as sweating but did not check BG - uses glucose tablets which helps. Taking glipizide 10 mg bid, metformin bid, januvia 100 and lantus 8 units daily.

## 2019-06-19 NOTE — PROGRESS NOTES
"Chief Complaint   Patient presents with   • Diabetes       HPI  Kenia is a 74-year-old established female of Dr. Parr's, here to follow-up on chronic issues but overall tells me she feels well.  Type 2 diabetes mellitus without complication, without long-term current use of insulin (HCC)  Chronic issue.  A1C has dropped from 7.8 - 6.6 over the past 4 mo.  BG at home - 130's.  Had one episode of hypoglycemia manifested as sweating but did not check BG - uses glucose tablets which helps. Taking glipizide 10 mg bid, metformin bid, januvia 100 and lantus 8 units daily.  She does tell me that she is following a pretty strict diabetic diet.  Walks occasionally for exercise.    HTN (Hypertension)  Chronic issue.  Taking lisinopril 40 mg, diltiazem 300mg and spironolactone daily.  BP well controlled at home.  Denies swelling in her ankles. No chest pain.  Quit smoking over 30 yrs ago.  Also followed by cardiology.    Hyperlipidemia with target LDL less than 100  Chronic issue. Compliant with rosuvastatin.  Having LP drawn before seeing Dr. Flores in Sept.  States diet is ok in terms of fatty animal protein avoidance, not super healthy.  Not exercising.      Past medical, surgical, family, and social history is reviewed and updated in Epic chart by me today.   Medications and allergies reviewed and updated in Epic chart by me today.     ROS:   As documented in history of present illness above    Exam:  /52 (BP Location: Left arm, Patient Position: Sitting, BP Cuff Size: Adult)   Pulse 84   Temp 36.9 °C (98.5 °F)   Resp 14   Ht 1.549 m (5' 1\")   Wt 56.7 kg (125 lb)   SpO2 94%   Constitutional: Alert, no distress, plus 3 vital signs  Skin:  Warm, dry, no rashes invisible areas  Eye: Equal, round and reactive, conjunctiva clear  ENMT: Lips without lesions  Respiratory: Unlabored respiration, lungs clear to auscultation, no wheezes, no rhonchi  Cardiovascular: Normal rate and rhythm  Psych: Alert, pleasant, " well-groomed, normal affect  Monofilament testing with a 10 gram force: sensation intact in 4/4 bilaterally.   Visual Inspection: Her feet are covered in what appears to be chronic thick calluses all over the heels and lateral aspects of her feet as well as the plantar aspects of the ball of her foot without any open wounds or cracks.      Assessment / Plan / Medical Decision makin. Type 2 diabetes mellitus with diabetic mononeuropathy, with long-term current use of insulin (HCC)  -A1c is excellent today.  Decrease glipizide to 5 mg twice daily to decrease risk of hypoglycemia.  She will continue current dosage of Lantus, metformin and Januvia.  Follow-up 3 months for recheck.  Reminded her of the treatment of hypoglycemia.  She is up-to-date with her eye doctor.  I did offer a referral to podiatry for the multiple calluses covering her feet which she declines, stating that a podiatrist recommended she use a drill multiple on her own.  Discussed the importance of coming to see our office if she develops foot redness, open wounds or any purulent discharge.  - POCT Hemoglobin A1C  - glipiZIDE (GLUCOTROL) 5 MG Tab; Take 1 Tab by mouth 2 times a day.  Dispense: 4 Tab; Refill: 1    2. Essential hypertension  -Stable.  Continue same.    3. Hyperlipidemia with target LDL less than 100  -Stable and also followed by cardiology.  Compliant with statin therapy.

## 2019-07-22 RX ORDER — METFORMIN HYDROCHLORIDE 500 MG/1
500 TABLET, EXTENDED RELEASE ORAL
Qty: 360 TAB | Refills: 0 | Status: SHIPPED | OUTPATIENT
Start: 2019-07-22 | End: 2020-03-31 | Stop reason: SDUPTHER

## 2019-07-22 NOTE — TELEPHONE ENCOUNTER
Was the patient seen in the last year in this department? Yes  6/19/19  Does patient have an active prescription for medications requested? No     Received Request Via: Patient

## 2019-07-22 NOTE — TELEPHONE ENCOUNTER
----- Message from Kenia Oconnell sent at 7/22/2019  4:03 PM PDT -----  Regarding: Non-Urgent Medical Question  Contact: [phone]  Would you please send a prescription for metformin to [facility].      Many thanks.    Kenia Oconnell

## 2019-07-23 RX ORDER — SITAGLIPTIN 100 MG/1
TABLET, FILM COATED ORAL
Qty: 90 TAB | Refills: 1 | Status: SHIPPED | OUTPATIENT
Start: 2019-07-23 | End: 2019-12-05 | Stop reason: SDUPTHER

## 2019-07-23 NOTE — TELEPHONE ENCOUNTER
Was the patient seen in the last year in this department? Yes  6/19/19  Does patient have an active prescription for medications requested? No     Received Request Via: Pharmacy

## 2019-09-03 ENCOUNTER — HOSPITAL ENCOUNTER (OUTPATIENT)
Dept: LAB | Facility: MEDICAL CENTER | Age: 74
End: 2019-09-03
Attending: INTERNAL MEDICINE
Payer: MEDICARE

## 2019-09-03 DIAGNOSIS — I10 HTN (HYPERTENSION), MALIGNANT: ICD-10-CM

## 2019-09-03 DIAGNOSIS — Z82.49 FAMILY HISTORY OF EARLY CAD: ICD-10-CM

## 2019-09-03 DIAGNOSIS — Z79.899 HIGH RISK MEDICATION USE: ICD-10-CM

## 2019-09-03 DIAGNOSIS — Z79.4 TYPE 2 DIABETES MELLITUS WITH HYPERGLYCEMIA, WITH LONG-TERM CURRENT USE OF INSULIN (HCC): ICD-10-CM

## 2019-09-03 DIAGNOSIS — E11.65 TYPE 2 DIABETES MELLITUS WITH HYPERGLYCEMIA, WITH LONG-TERM CURRENT USE OF INSULIN (HCC): ICD-10-CM

## 2019-09-03 DIAGNOSIS — J44.9 CHRONIC OBSTRUCTIVE PULMONARY DISEASE, UNSPECIFIED COPD TYPE (HCC): ICD-10-CM

## 2019-09-03 LAB
ALBUMIN SERPL BCP-MCNC: 4.5 G/DL (ref 3.2–4.9)
ALBUMIN/GLOB SERPL: 1.4 G/DL
ALP SERPL-CCNC: 76 U/L (ref 30–99)
ALT SERPL-CCNC: 21 U/L (ref 2–50)
ANION GAP SERPL CALC-SCNC: 10 MMOL/L (ref 0–11.9)
AST SERPL-CCNC: 20 U/L (ref 12–45)
BILIRUB SERPL-MCNC: 0.4 MG/DL (ref 0.1–1.5)
BUN SERPL-MCNC: 20 MG/DL (ref 8–22)
CALCIUM SERPL-MCNC: 10.2 MG/DL (ref 8.5–10.5)
CHLORIDE SERPL-SCNC: 106 MMOL/L (ref 96–112)
CHOLEST SERPL-MCNC: 158 MG/DL (ref 100–199)
CO2 SERPL-SCNC: 20 MMOL/L (ref 20–33)
CREAT SERPL-MCNC: 1.35 MG/DL (ref 0.5–1.4)
FASTING STATUS PATIENT QL REPORTED: NORMAL
GLOBULIN SER CALC-MCNC: 3.2 G/DL (ref 1.9–3.5)
GLUCOSE SERPL-MCNC: 173 MG/DL (ref 65–99)
HDLC SERPL-MCNC: 48 MG/DL
LDLC SERPL CALC-MCNC: 72 MG/DL
POTASSIUM SERPL-SCNC: 5.1 MMOL/L (ref 3.6–5.5)
PROT SERPL-MCNC: 7.7 G/DL (ref 6–8.2)
SODIUM SERPL-SCNC: 136 MMOL/L (ref 135–145)
TRIGL SERPL-MCNC: 188 MG/DL (ref 0–149)

## 2019-09-03 PROCEDURE — 36415 COLL VENOUS BLD VENIPUNCTURE: CPT

## 2019-09-03 PROCEDURE — 80061 LIPID PANEL: CPT

## 2019-09-03 PROCEDURE — 80053 COMPREHEN METABOLIC PANEL: CPT

## 2019-09-09 ENCOUNTER — OFFICE VISIT (OUTPATIENT)
Dept: CARDIOLOGY | Facility: MEDICAL CENTER | Age: 74
End: 2019-09-09
Payer: MEDICARE

## 2019-09-09 VITALS
WEIGHT: 131 LBS | DIASTOLIC BLOOD PRESSURE: 62 MMHG | OXYGEN SATURATION: 96 % | BODY MASS INDEX: 24.73 KG/M2 | HEIGHT: 61 IN | SYSTOLIC BLOOD PRESSURE: 110 MMHG | HEART RATE: 70 BPM

## 2019-09-09 DIAGNOSIS — J44.9 CHRONIC OBSTRUCTIVE PULMONARY DISEASE, UNSPECIFIED COPD TYPE (HCC): ICD-10-CM

## 2019-09-09 DIAGNOSIS — Z82.49 FAMILY HISTORY OF EARLY CAD: ICD-10-CM

## 2019-09-09 DIAGNOSIS — E78.2 MIXED HYPERLIPIDEMIA: ICD-10-CM

## 2019-09-09 DIAGNOSIS — Z79.899 HIGH RISK MEDICATION USE: ICD-10-CM

## 2019-09-09 DIAGNOSIS — Z00.00 PREVENTATIVE HEALTH CARE: ICD-10-CM

## 2019-09-09 DIAGNOSIS — E11.65 TYPE 2 DIABETES MELLITUS WITH HYPERGLYCEMIA, WITH LONG-TERM CURRENT USE OF INSULIN (HCC): ICD-10-CM

## 2019-09-09 DIAGNOSIS — I10 HTN (HYPERTENSION), MALIGNANT: ICD-10-CM

## 2019-09-09 DIAGNOSIS — Z79.4 TYPE 2 DIABETES MELLITUS WITH HYPERGLYCEMIA, WITH LONG-TERM CURRENT USE OF INSULIN (HCC): ICD-10-CM

## 2019-09-09 PROCEDURE — 99214 OFFICE O/P EST MOD 30 MIN: CPT | Performed by: INTERNAL MEDICINE

## 2019-09-09 RX ORDER — DILTIAZEM HYDROCHLORIDE 300 MG/1
300 CAPSULE, COATED, EXTENDED RELEASE ORAL DAILY
Qty: 90 CAP | Refills: 3 | Status: SHIPPED | OUTPATIENT
Start: 2019-09-09 | End: 2020-05-11 | Stop reason: SDUPTHER

## 2019-09-09 RX ORDER — SPIRONOLACTONE 25 MG/1
25 TABLET ORAL DAILY
Qty: 90 TAB | Refills: 3 | Status: SHIPPED | OUTPATIENT
Start: 2019-09-09 | End: 2020-03-02 | Stop reason: SDUPTHER

## 2019-09-09 RX ORDER — LISINOPRIL 40 MG/1
40 TABLET ORAL DAILY
Qty: 100 TAB | Refills: 2 | Status: SHIPPED | OUTPATIENT
Start: 2019-09-09 | End: 2020-03-02 | Stop reason: SDUPTHER

## 2019-09-09 RX ORDER — ROSUVASTATIN CALCIUM 10 MG/1
TABLET, COATED ORAL
Qty: 100 TAB | Refills: 3 | Status: SHIPPED | OUTPATIENT
Start: 2019-09-09 | End: 2020-03-02 | Stop reason: SDUPTHER

## 2019-09-09 ASSESSMENT — ENCOUNTER SYMPTOMS
FALLS: 0
DEPRESSION: 0
CHILLS: 0
LOSS OF CONSCIOUSNESS: 0
ORTHOPNEA: 0
NAUSEA: 0
DIZZINESS: 0
HALLUCINATIONS: 0
BLURRED VISION: 0
MYALGIAS: 0
BRUISES/BLEEDS EASILY: 0
COUGH: 0
EYE DISCHARGE: 0
DOUBLE VISION: 0
PND: 0
WEIGHT LOSS: 0
SPEECH CHANGE: 0
VOMITING: 0
HEADACHES: 0
PALPITATIONS: 0
CLAUDICATION: 0
EYE PAIN: 0
BLOOD IN STOOL: 0
SHORTNESS OF BREATH: 0
SENSORY CHANGE: 0
FEVER: 0
ABDOMINAL PAIN: 0

## 2019-09-09 NOTE — PROGRESS NOTES
"Chief Complaint   Patient presents with   • Hypertension     F/V: 6 MO       Subjective:   Kenia Oconnell is a 73 y.o. female who presents today for cardiac care of CAD, HTN, HLP, DM.  In the interim, patient has been doing well without having any symptoms. Patient denies having chest pain, dyspnea, palpitation, presyncope, syncope episodes. Able to climb up at least 2 flights of stairs.     I have independently interpreted and reviewed blood tests results with patient in clinic which shows normal LDL level 60, elevated triglycerides 188, normal renal and liver function.     Blood pressure is well controlled.    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   • Cataract     bilat   • Cervical High Risk HPV Test Positive 10/18/2006   • COPD (chronic obstructive pulmonary disease) (Formerly [facility]) 6/11/2009   • DDD (degenerative disc disease), lumbar 8/14/2013   • Depression 6/11/2009   • Emphysema of lung (Formerly [facility])    • Glaucoma    • Glaucoma suspect 6/11/2009   • HDL deficiency 3/30/2012   • Heart burn    • History of Gout when on HCTZ 6/11/2009   • Hyperlipidemia LDL goal < 100 6/11/2009   • Hyperplastic colon polyp 12/7/2007   • Hypertension    • Hypertriglyceridemia 3/30/2012   • Personal history of allergy to eggs 4/2/2010   • Posterior vitreous detachment of right eye 7/6/2015   • Seizure (Formerly [facility]) 1953    \"sun stroke\"   • Type II or unspecified type diabetes mellitus without mention of complication, not stated as uncontrolled     oral meds and insulin   • Vitamin d deficiency 4/2/2010     Past Surgical History:   Procedure Laterality Date   • CATARACT PHACO WITH IOL Right 11/7/2017    Procedure: CATARACT PHACO WITH IOL;  Surgeon: Carlos Arce M.D.;  Location: SURGERY SAME DAY [facility];  Service: Ophthalmology   • CATARACT PHACO WITH IOL " "Left 10/17/2017    Procedure: CATARACT PHACO WITH IOL;  Surgeon: Carlos Arce M.D.;  Location: SURGERY SAME DAY [facility];  Service: Ophthalmology   • EYE SURGERY Bilateral     Dr. Garrido   • SINUSOTOMIES     • EYE SURGERY      \"laser for eye pressure\"   • DILATION AND CURETTAGE     • TONSILLECTOMY       Family History   Problem Relation Age of Onset   • Heart Disease Father         d. MI 50s   • Hypertension Father    • Heart Attack Father    • Lung Disease Mother         d. lung dz   • Hypertension Mother    • Cancer Paternal Grandfather         Black Lung   • Diabetes Paternal Grandmother    • Heart Disease Sister 59        mi and CVA -    • Stroke Sister    • Heart Attack Brother    • Diabetes Other         Paternal Cousin DM1     Social History     Socioeconomic History   • Marital status:      Spouse name: Not on file   • Number of children: 1   • Years of education: Not on file   • Highest education level: Not on file   Occupational History   • Not on file   Social Needs   • Financial resource strain: Not on file   • Food insecurity:     Worry: Not on file     Inability: Not on file   • Transportation needs:     Medical: Not on file     Non-medical: Not on file   Tobacco Use   • Smoking status: Former Smoker     Packs/day: 1.00     Years: 29.00     Pack years: 29.00     Types: Cigarettes     Start date: 1966     Last attempt to quit: 1995     Years since quittin.7   • Smokeless tobacco: Never Used   Substance and Sexual Activity   • Alcohol use: Yes     Alcohol/week: 0.0 oz     Comment: 1 glass occasionally   • Drug use: Yes     Types: Marijuana   • Sexual activity: Yes     Partners: Male   Lifestyle   • Physical activity:     Days per week: Not on file     Minutes per session: Not on file   • Stress: Not on file   Relationships   • Social connections:     Talks on phone: Not on file     Gets together: Not on file     Attends Nondenominational service: Not on file     " Active member of club or organization: Not on file     Attends meetings of clubs or organizations: Not on file     Relationship status: Not on file   • Intimate partner violence:     Fear of current or ex partner: Not on file     Emotionally abused: Not on file     Physically abused: Not on file     Forced sexual activity: Not on file   Other Topics Concern   • Not on file   Social History Narrative    2013. . Has sig other for many years.      Allergies   Allergen Reactions   • Amlodipine Swelling     LE edema   • Pcn [Penicillins] Rash     .   • Sulfa Drugs Rash     .   • Toprol Xl [Metoprolol]      Fatigue       Outpatient Encounter Medications as of 9/9/2019   Medication Sig Dispense Refill   • lisinopril (PRINIVIL, ZESTRIL) 40 MG tablet Take 1 Tab by mouth every day. 100 Tab 2   • DILTIAZem CD (CARTIA XT) 300 MG CAPSULE SR 24 HR Take 1 Cap by mouth every day. 90 Cap 3   • spironolactone (ALDACTONE) 25 MG Tab Take 1 Tab by mouth every day. 90 Tab 3   • rosuvastatin (CRESTOR) 10 MG Tab TAKE ONE TABLET BY MOUTH DAILY 100 Tab 3   • JANUVIA 100 MG Tab TAKE ONE TABLET BY MOUTH DAILY 90 Tab 1   • metFORMIN ER (GLUCOPHAGE XR) 500 MG TABLET SR 24 HR Take 1 Tab by mouth every day. 360 Tab 0   • glipiZIDE (GLUCOTROL) 5 MG Tab Take 1 Tab by mouth 2 times a day. 4 Tab 1   • sertraline (ZOLOFT) 100 MG Tab TAKE 1 TABLET BY MOUTH DAILY. 90 Tab 3   • clobetasol (TEMOVATE) 0.05 % Ointment Apply 1 Application to affected area(s) 2 times a day. 60 g 11   • albuterol 108 (90 Base) MCG/ACT Aero Soln inhalation aerosol Inhale 1-2 Puffs by mouth every 6 hours as needed for Shortness of Breath. 1 Inhaler 3   • DULERA 100-5 MCG/ACT Aerosol INHALE ONE PUFF BY MOUTH TWICE A DAY 26 g 2   • cyclobenzaprine (FLEXERIL) 10 MG Tab TAKE 1/2 TO 1 TABLET AT BEDTIME AS NEEDED FOR PAIN/SPASMS 90 Tab 3   • insulin glargine (LANTUS SOLOSTAR) 100 UNIT/ML Solution Pen-injector injection Inject 15 Units as instructed every evening. (Patient  taking differently: Inject 7 Units as instructed every evening.) 15 mL 5   • Insulin Pen Needle 32G X 4 MM Misc Using one per day with Lantus injection 90 Each 3   • aspirin EC (ECOTRIN) 81 MG Tablet Delayed Response Take 81 mg by mouth every day.     • Polyethyl Glycol-Propyl Glycol (SYSTANE FREE OP) by Ophthalmic route.     • glucose blood (ONE TOUCH ULTRA TEST) strip USE TO TEST BLOOD SUGAR DAILY 100 Strip 11   • LIFESCAN FINEPOINT LANCETS MISC USE TO TEST BLOOD SUGAR DAILY 100 Each 11   • fluticasone (FLONASE) 50 MCG/ACT nasal spray USE ONE SPRAY IN EACH NOSTRIL DAILY 1 Bottle 5   • NON SPECIFIED by Other route. 1. Lancettes. Use daily #100 RF x 1 year  2. Glucometer test strips. Use daily #100 RF x 1 year.  Dx: 250.00 100 Each 1   • cetirizine (ZYRTEC) 10 MG TABS Take 10 mg by mouth every day.       • vitamin D (CHOLECALCIFEROL) 1000 UNIT TABS Take 1,000 Units by mouth every day.     • [DISCONTINUED] lisinopril (PRINIVIL, ZESTRIL) 40 MG tablet Take 1 Tab by mouth every day. 100 Tab 2   • [DISCONTINUED] rosuvastatin (CRESTOR) 10 MG Tab TAKE ONE TABLET BY MOUTH DAILY 100 Tab 3   • [DISCONTINUED] DILTIAZem CD (CARTIA XT) 300 MG CAPSULE SR 24 HR Take 1 Cap by mouth every day. 90 Cap 3   • [DISCONTINUED] spironolactone (ALDACTONE) 25 MG Tab Take 1 Tab by mouth every day. 90 Tab 3     No facility-administered encounter medications on file as of 9/9/2019.      Review of Systems   Constitutional: Negative for chills, fever, malaise/fatigue and weight loss.   HENT: Negative for ear discharge, ear pain, hearing loss and nosebleeds.    Eyes: Negative for blurred vision, double vision, pain and discharge.   Respiratory: Negative for cough and shortness of breath.    Cardiovascular: Negative for chest pain, palpitations, orthopnea, claudication, leg swelling and PND.   Gastrointestinal: Negative for abdominal pain, blood in stool, melena, nausea and vomiting.   Genitourinary: Negative for dysuria and hematuria.  "  Musculoskeletal: Negative for falls, joint pain and myalgias.   Skin: Negative for itching and rash.   Neurological: Negative for dizziness, sensory change, speech change, loss of consciousness and headaches.   Endo/Heme/Allergies: Negative for environmental allergies. Does not bruise/bleed easily.   Psychiatric/Behavioral: Negative for depression, hallucinations and suicidal ideas.        Objective:   /62 (BP Location: Right arm, Patient Position: Sitting, BP Cuff Size: Adult)   Pulse 70   Ht 1.549 m (5' 1\")   Wt 59.4 kg (131 lb)   LMP 01/01/2000   SpO2 96%   BMI 24.75 kg/m²     Physical Exam   Constitutional: She is oriented to person, place, and time. No distress.   HENT:   Head: Normocephalic and atraumatic.   Right Ear: External ear normal.   Left Ear: External ear normal.   Eyes: Right eye exhibits no discharge. Left eye exhibits no discharge.   Neck: No JVD present. No thyromegaly present.   Cardiovascular: Normal rate, regular rhythm, normal heart sounds and intact distal pulses. Exam reveals no gallop and no friction rub.   No murmur heard.  Pulmonary/Chest: Breath sounds normal. No respiratory distress.   Abdominal: Bowel sounds are normal. She exhibits no distension. There is no tenderness.   Musculoskeletal: She exhibits no edema or tenderness.   Neurological: She is alert and oriented to person, place, and time. No cranial nerve deficit.   Skin: Skin is warm and dry. She is not diaphoretic.   Psychiatric: She has a normal mood and affect. Her behavior is normal.   Nursing note and vitals reviewed.      Assessment:     1. HTN (hypertension), malignant  lisinopril (PRINIVIL, ZESTRIL) 40 MG tablet    DILTIAZem CD (CARTIA XT) 300 MG CAPSULE SR 24 HR    spironolactone (ALDACTONE) 25 MG Tab   2. Family history of early CAD  rosuvastatin (CRESTOR) 10 MG Tab   3. Type 2 diabetes mellitus with hyperglycemia, with long-term current use of insulin ([facility])  LIPID PANEL    Comp Metabolic Panel   4. " Chronic obstructive pulmonary disease, unspecified COPD type (HCC)     5. Preventative health care     6. Mixed hyperlipidemia     7. High risk medication use         Medical Decision Making:  Today's Assessment / Status / Plan:   Today, based on physical examination findings, patient is euvolemic. No JVD, lungs are clear to auscultation, no pitting edema in bilateral lower extremities, no ascites.    Dry weight is 131 lbs.    Diet change to reduce triglyceride.    At this time patient is clinically stable in terms of her cardiac standpoint.    Continue current medications at current dose as above. Refills were prescribed today and patient was instructed with plan of care.

## 2019-09-20 DIAGNOSIS — E11.69 DM TYPE 2 WITH DIABETIC DYSLIPIDEMIA (HCC): ICD-10-CM

## 2019-09-20 DIAGNOSIS — E78.5 DM TYPE 2 WITH DIABETIC DYSLIPIDEMIA (HCC): ICD-10-CM

## 2019-09-20 NOTE — TELEPHONE ENCOUNTER
----- Message from Kenia Oconnell sent at 9/19/2019 10:56 PM PDT -----  Regarding: Non-Urgent Medical Question  Contact: [phone]  When you have time, please renew my dulera and bd zaheer ultra fine pen needles.  Many thanks.    Kenia Oconnell

## 2019-09-23 ENCOUNTER — OFFICE VISIT (OUTPATIENT)
Dept: MEDICAL GROUP | Facility: LAB | Age: 74
End: 2019-09-23
Payer: MEDICARE

## 2019-09-23 VITALS
SYSTOLIC BLOOD PRESSURE: 110 MMHG | HEIGHT: 61 IN | HEART RATE: 72 BPM | BODY MASS INDEX: 24.17 KG/M2 | OXYGEN SATURATION: 94 % | RESPIRATION RATE: 12 BRPM | DIASTOLIC BLOOD PRESSURE: 58 MMHG | WEIGHT: 128 LBS | TEMPERATURE: 97.1 F

## 2019-09-23 DIAGNOSIS — E11.9 TYPE 2 DIABETES MELLITUS WITHOUT COMPLICATION, WITHOUT LONG-TERM CURRENT USE OF INSULIN (HCC): ICD-10-CM

## 2019-09-23 DIAGNOSIS — Z23 NEED FOR INFLUENZA VACCINATION: ICD-10-CM

## 2019-09-23 DIAGNOSIS — J44.9 CHRONIC OBSTRUCTIVE PULMONARY DISEASE, UNSPECIFIED COPD TYPE (HCC): ICD-10-CM

## 2019-09-23 DIAGNOSIS — I10 ESSENTIAL HYPERTENSION: ICD-10-CM

## 2019-09-23 LAB
HBA1C MFR BLD: 6.8 % (ref 0–5.6)
INT CON NEG: NEGATIVE
INT CON POS: POSITIVE

## 2019-09-23 PROCEDURE — 90662 IIV NO PRSV INCREASED AG IM: CPT | Performed by: NURSE PRACTITIONER

## 2019-09-23 PROCEDURE — G0008 ADMIN INFLUENZA VIRUS VAC: HCPCS | Performed by: NURSE PRACTITIONER

## 2019-09-23 PROCEDURE — 99214 OFFICE O/P EST MOD 30 MIN: CPT | Mod: 25 | Performed by: NURSE PRACTITIONER

## 2019-09-23 PROCEDURE — 83036 HEMOGLOBIN GLYCOSYLATED A1C: CPT | Performed by: NURSE PRACTITIONER

## 2019-09-23 NOTE — PROGRESS NOTES
MANDA Aquino is a 74-year-old established female here to follow-up on chronic issues.  She tells me that she has been feeling great.    #1-DM type II:   Chronic issue.  At our visit 3 mo ago we decreased glipizide to 5 mg twice daily to decrease risk of hypoglycemia as A1C dropped to 6.6 and she was having some bouts of hypoglycemia at home around 80, associated with shaking and sweating.    She has continued on her current dosage of Lantus 7 units daily, metformin 500 mg daily and Januvia 100 mg daily.  BG at home - 200- 107.  She tells me that she does worry about hypoglycemia and frequently gives herself liquid glucose based on how she is feeling, not her blood sugars.  Has gained 3 lbs in 3 months.    She is planning on joining a gym.  Currently walking for exercise.    #2-hypertension: Chronic issue.  Her blood pressure has been doing great.  She denies chest pain or leg swelling.  Continues on lisinopril 40 mg, diltiazem 300 mg and spironolactone 25 mg daily.    #3- COPD: Chronic issue.  Continues on Dulera twice daily and albuterol as needed.  Tells me her breathing's been doing great.  She is due for her flu shot today and would like to have it.  Denies any recent upper respiratory tract infection.    Past medical, surgical, family, and social history is reviewed and updated in Epic chart by me today.   Medications and allergies reviewed and updated in Epic chart by me today.     ROS:   Occasional constipated - alleviated by stool softeners.      Exam:    Vitals:    09/23/19 1321   BP: 110/58   Pulse: 72   Resp: 12   Temp: 36.2 °C (97.1 °F)   SpO2: 94%       Constitutional: Alert, no distress, plus 3 vital signs  Skin:  Warm, dry, no rashes invisible areas  Eye: Equal, round and reactive, conjunctiva clear  ENMT: Lips without lesions, good dentition, oropharynx clear    Neck: Trachea midline, no masses, no thyromegaly  Respiratory: Unlabored respiration, lungs clear to auscultation, no wheezes, no  rhonchi  Cardiovascular: Normal rate and rhythm, no murmur, no edema  Abdomen: Soft, nontender, no masses or hepatosplenomegaly  Psych: Alert, pleasant, well-groomed, normal affect    Assessment / Plan / Medical Decision makin. Type 2 diabetes mellitus without complication, without long-term current use of insulin (HCC)  -A1c is still excellent today at 6.8.  I eliminated her sulfonylurea today because of her concern of her hypoglycemia.  We discussed the importance of exercising on a daily basis and following a diabetic diet.  She will continue on Lantus and metformin.  We will see her back in 3 months to check an A1c.  She will notify me if her blood sugars are consistently greater than 150 or below 100.  - POCT  A1C  - Comp Metabolic Panel; Future  - HEMOGLOBIN A1C; Future  - MICROALBUMIN CREAT RATIO URINE; Future  - NON SPECIFIED; BD fine needles for Lantus pens  Inject lantus nightly.  Dispense: 90 Each; Refill: 3    2. Chronic obstructive pulmonary disease, unspecified COPD type (HCC)  -Stable.  Continue same.  - Mometasone Furo-Formoterol Fum (DULERA) 100-5 MCG/ACT Aerosol; Inhale 1 Puff by mouth 2 Times a Day.  Dispense: 2 Inhaler; Refill: 2    3. Need for influenza vaccination  - Influenza Vaccine, High Dose (65+ Only)    4. Essential hypertension  -Stable.  Continue same.

## 2019-11-11 DIAGNOSIS — M51.36 DDD (DEGENERATIVE DISC DISEASE), LUMBAR: ICD-10-CM

## 2019-11-12 RX ORDER — CYCLOBENZAPRINE HCL 10 MG
TABLET ORAL
Qty: 90 TAB | Refills: 2 | Status: SHIPPED | OUTPATIENT
Start: 2019-11-12 | End: 2020-03-16 | Stop reason: SDUPTHER

## 2019-11-12 NOTE — TELEPHONE ENCOUNTER
Was the patient seen in the last year in this department? Yes lov 9/23/19    Does patient have an active prescription for medications requested? No     Received Request Via: Pharmacy

## 2019-12-06 DIAGNOSIS — E11.69 DM TYPE 2 WITH DIABETIC DYSLIPIDEMIA (HCC): ICD-10-CM

## 2019-12-06 DIAGNOSIS — E78.5 DM TYPE 2 WITH DIABETIC DYSLIPIDEMIA (HCC): ICD-10-CM

## 2019-12-06 NOTE — TELEPHONE ENCOUNTER
Was the patient seen in the last year in this department? Yes  9/23/19  Does patient have an active prescription for medications requested? No     Received Request Via: Pharmacy

## 2019-12-10 RX ORDER — SITAGLIPTIN 100 MG/1
TABLET, FILM COATED ORAL
Qty: 100 TAB | Refills: 0 | Status: SHIPPED
Start: 2019-12-10 | End: 2020-11-24

## 2019-12-30 ENCOUNTER — OFFICE VISIT (OUTPATIENT)
Dept: MEDICAL GROUP | Facility: LAB | Age: 74
End: 2019-12-30
Payer: MEDICARE

## 2019-12-30 VITALS
HEART RATE: 68 BPM | BODY MASS INDEX: 22.84 KG/M2 | RESPIRATION RATE: 12 BRPM | HEIGHT: 61 IN | OXYGEN SATURATION: 98 % | SYSTOLIC BLOOD PRESSURE: 98 MMHG | TEMPERATURE: 98.8 F | WEIGHT: 121 LBS | DIASTOLIC BLOOD PRESSURE: 62 MMHG

## 2019-12-30 DIAGNOSIS — J44.9 CHRONIC OBSTRUCTIVE PULMONARY DISEASE, UNSPECIFIED COPD TYPE (HCC): ICD-10-CM

## 2019-12-30 DIAGNOSIS — Z23 NEED FOR HEPATITIS B VACCINATION: ICD-10-CM

## 2019-12-30 DIAGNOSIS — E11.9 TYPE 2 DIABETES MELLITUS WITHOUT COMPLICATION, WITH LONG-TERM CURRENT USE OF INSULIN (HCC): ICD-10-CM

## 2019-12-30 DIAGNOSIS — J45.20 MILD INTERMITTENT ASTHMA, UNSPECIFIED WHETHER COMPLICATED: ICD-10-CM

## 2019-12-30 DIAGNOSIS — Z79.4 TYPE 2 DIABETES MELLITUS WITHOUT COMPLICATION, WITH LONG-TERM CURRENT USE OF INSULIN (HCC): ICD-10-CM

## 2019-12-30 DIAGNOSIS — I10 ESSENTIAL HYPERTENSION: ICD-10-CM

## 2019-12-30 PROCEDURE — 99214 OFFICE O/P EST MOD 30 MIN: CPT | Mod: 25 | Performed by: NURSE PRACTITIONER

## 2019-12-30 PROCEDURE — G0010 ADMIN HEPATITIS B VACCINE: HCPCS | Performed by: NURSE PRACTITIONER

## 2019-12-30 PROCEDURE — 90746 HEPB VACCINE 3 DOSE ADULT IM: CPT | Performed by: NURSE PRACTITIONER

## 2019-12-30 NOTE — PATIENT INSTRUCTIONS
-Increase lantus by 3 units every 5 days until fasting blood sugar is below 150.  Please email me your blood sugars weekly for the next few weeks.    Check your Blood sugar every morning when you wake up.   -continue 500 mg metformin.   -avoid white carbs  -do labs before next visit.

## 2019-12-30 NOTE — PROGRESS NOTES
"    MANDA  Kenia is a 74-year-old established female here to follow-up on type 2 diabetes.  At our visit 3 months ago sulfonylurea therapy was discontinued because of some hypoglycemic episodes & januvia b/c of cost.  She continues on Lantus and metformin.  Her A1c 3 months ago was 6.8.   Home blood sugars: 100-179.    Injecting lantus 8 units about 4 nights per week -telling me that she has a lot of difficulty remembering to check her blood sugars and take her Lantus  Remembering metformin about 6 d per week - 500 mg daily.    Denies foot numbness / tingling.    Denies vision changes - next appt 5/2020.  a1c range 12 - 6.6 over the past few years.   Exercise:  House work.    Has lost 7 lbs in the past 3 months - appetite has been lower.      Asthma/COPD:  Pt describes this as well controlled. Using dulera bid and albuterol a few x per year.  Due for PFTs.  Has not smoked in years.    Hypertension: Chronic issue.  Not checking her blood pressures.  Denies dizziness, chest pain or headaches.  Compliant with lisinopril, diltiazem and spironolactone.    Past medical, surgical, family, and social history is reviewed and updated in Epic chart by me today.   Medications and allergies reviewed and updated in Epic chart by me today.     ROS:   As documented in history of present illness above    Exam:  BP (!) 98/62 (BP Location: Left arm, Patient Position: Sitting, BP Cuff Size: Adult)   Pulse 68   Temp 37.1 °C (98.8 °F)   Resp 12   Ht 1.549 m (5' 1\")   Wt 54.9 kg (121 lb)   SpO2 98%     Constitutional: Alert, no distress, plus 3 vital signs  Skin:  Warm, dry, no rashes invisible areas  Eye: Equal, round and reactive, conjunctiva clear  ENMT: Lips without lesions, good dentition, oropharynx clear    Neck: Trachea midline, no masses, no thyromegaly  Respiratory: Unlabored respiration, lungs clear to auscultation, no wheezes, no rhonchi  Cardiovascular: Normal rate and rhythm, no murmur, no edema  Abdomen: Soft, " nontender, no masses or hepatosplenomegaly  Psych: Alert, pleasant, well-groomed, normal affect    Assessment / Plan / Medical Decision makin. Need for hepatitis B vaccination  - Hepatitis B Vaccine Adult IM    2. Mild intermittent asthma, unspecified whether complicated  -Recommend updated pulmonary function testing  - PULMONARY FUNCTION TESTS -Test requested: Complete Pulmonary Function Test; Future    3. Essential hypertension  -Stable.  Discussed that her blood pressure is borderline low today.  She has lost 7 pounds.  She will start checking her blood pressures regularly at home and if her blood pressure is consistently below 90/60, she will decrease lisinopril by one half and contact me.    4. Chronic obstructive pulmonary disease, unspecified COPD type (HCC)  -Stable.  Continue same.  Update PFTs.  Influenza vaccine is up-to-date.    5.  Type 2 diabetes with long-term use of insulin: Unfortunately her A1c is up to 10 today.  Increased Lantus by 3 units every 3 to 4 days until her fasting blood sugars are consistently below 150.  Continue metformin 500 mg daily, larger doses made her very bloated.  Refrained from adding back on Januvia because of cost and glimepiride because of her difficulty remembering to eat when taking this medication.  Discussed the importance of diligently checking her blood sugars every morning fasting and about 2 hours after eating dinner, emailing me her blood sugars weekly.  Discussed long-term repercussions of poorly controlled type 2 diabetes.  We had a in-depth discussion about her diet and I gave her several advisory changes regarding meal choices that she has been making.  Particularly she will cut out a lot of the Cheerios with milk that she has been eating and substitute a lean protein such as egg whites and vegetables.  I will see her back here in 3 months for an A1c check.

## 2020-02-13 ENCOUNTER — HOSPITAL ENCOUNTER (OUTPATIENT)
Dept: PULMONOLOGY | Facility: MEDICAL CENTER | Age: 75
End: 2020-02-13
Attending: NURSE PRACTITIONER
Payer: MEDICARE

## 2020-02-13 DIAGNOSIS — J45.20 MILD INTERMITTENT ASTHMA, UNSPECIFIED WHETHER COMPLICATED: ICD-10-CM

## 2020-02-13 PROCEDURE — 94726 PLETHYSMOGRAPHY LUNG VOLUMES: CPT

## 2020-02-13 PROCEDURE — 94060 EVALUATION OF WHEEZING: CPT

## 2020-02-13 PROCEDURE — 94729 DIFFUSING CAPACITY: CPT | Mod: 26 | Performed by: INTERNAL MEDICINE

## 2020-02-13 PROCEDURE — 94729 DIFFUSING CAPACITY: CPT

## 2020-02-13 PROCEDURE — 94060 EVALUATION OF WHEEZING: CPT | Mod: 26 | Performed by: INTERNAL MEDICINE

## 2020-02-13 PROCEDURE — 94726 PLETHYSMOGRAPHY LUNG VOLUMES: CPT | Mod: 26 | Performed by: INTERNAL MEDICINE

## 2020-02-13 ASSESSMENT — PULMONARY FUNCTION TESTS
FEV1_LLN: 1.62
FEV1/FVC_PERCENT_PREDICTED: 85
FEV1/FVC_PERCENT_PREDICTED: 84
FEV1/FVC: 66
FEV1/FVC_PERCENT_LLN: 65.38
FEV1/FVC_PERCENT_PREDICTED: 78.03
FEV1_PERCENT_PREDICTED: 70
FVC: 2.06
FEV1: 1.36
FVC_LLN: 2.09
FVC_PERCENT_PREDICTED: 82
FEV1/FVC_PERCENT_PREDICTED: 85
FEV1/FVC_PREDICTED: 78.30
FVC_PERCENT_PREDICTED: 84
FEV1/FVC: 66.02
FVC_PREDICTED: 2.5
FEV1_PERCENT_PREDICTED: 71
FEV1/FVC: 66.07
FVC: 2.11
FEV1/FVC_PERCENT_CHANGE: 84
FEV1_PERCENT_CHANGE: -2
FEV1/FVC: 66.07
FEV1/FVC_PERCENT_CHANGE: 100
FEV1: 1.39
FVC_LLN: 2.09
FEV1/FVC_PERCENT_LLN: 65.38
FEV1_PREDICTED: 1.94
FEV1/FVC_PERCENT_PREDICTED: 78
FEV1_PERCENT_CHANGE: -2
FEV1_LLN: 1.62

## 2020-02-14 NOTE — PROCEDURES
PULMONARY FUNCTION TEST INTERPRETATION    DATE OF STUDY:  02/13/2020    REFERRING PROVIDER:  FLORENCIO Rios    INTERPRETING PROVIDER:  Michael Arteaga III, MD    RESULTS:  SPIROMETRY:  1.  FVC pre-bronchodilator 2.11 liters, 84% predicted; post-bronchodilator   2.06 liters, negative 2% change.  2.  FEV1 pre-bronchodilator 1.39 liters, 71% predicted; post-bronchodilator   1.36 liters, negative 2% change.  3.  FEV1/FVC 66%.  4.  Maximum voluntary ventilation 62 liters per minute, 78% predicted.      LUNG VOLUMES:  1.  Residual volume 2.52 liters, 121% predicted.    2.  Total lung capacity 4.85 liters, 105% predicted.    3.  RV/TLC 51%.      DIFFUSION CAPACITY:    1.  DLCO corrected for hemoglobin 69% predicted.  2.  DL/VA 83% predicted.    INTERPRETATION:  1.  Spirometry is consistent with a mild obstructive ventilatory defect.    There is no significant response to bronchodilators.  The measured MVV is   reduced proportionately to the degree of reduction in FEV1.    2.  There is no significant restrictive ventilatory defect.  The elevated   RV/TLC ratio is suggestive of air trapping.    3.  There is a mild diffusion impairment.  Reduced DLCO and normal DLCO/VA   ratio is consistent with obstructive airway disease and underestimation of the   alveolar volumes due to mild distribution of ventilation.    4.  Compared to prior testing in 2016, FEV1 has declined from 1.49 liters to   1.39 liters.       ____________________________________     Michael Arteaga III, MD    [facility] / [facility]    DD:  02/14/2020 06:51:25  DT:  02/14/2020 08:04:27    D#:  [id number]  Job#:  [id number]

## 2020-03-02 DIAGNOSIS — Z82.49 FAMILY HISTORY OF EARLY CAD: ICD-10-CM

## 2020-03-02 DIAGNOSIS — J44.9 CHRONIC OBSTRUCTIVE PULMONARY DISEASE, UNSPECIFIED COPD TYPE (HCC): ICD-10-CM

## 2020-03-02 DIAGNOSIS — I10 HTN (HYPERTENSION), MALIGNANT: ICD-10-CM

## 2020-03-02 RX ORDER — SERTRALINE HYDROCHLORIDE 100 MG/1
TABLET, FILM COATED ORAL
Qty: 90 TAB | Refills: 3 | Status: SHIPPED | OUTPATIENT
Start: 2020-03-02 | End: 2020-03-16 | Stop reason: SDUPTHER

## 2020-03-02 RX ORDER — MOMETASONE FUROATE AND FORMOTEROL FUMARATE DIHYDRATE 100; 5 UG/1; UG/1
1 AEROSOL RESPIRATORY (INHALATION) 2 TIMES DAILY
Qty: 2 INHALER | Refills: 2 | Status: SHIPPED | OUTPATIENT
Start: 2020-03-02 | End: 2020-03-04 | Stop reason: SDUPTHER

## 2020-03-02 NOTE — TELEPHONE ENCOUNTER
Received request via: Pharmacy    Was the patient seen in the last year in this department? Yes12/30/19    Does the patient have an active prescription (recently filled or refills available) for medication(s) requested? No

## 2020-03-02 NOTE — TELEPHONE ENCOUNTER
Received request via: Pharmacy    Was the patient seen in the last year in this department? Yes  12/30/19  Does the patient have an active prescription (recently filled or refills available) for medication(s) requested? No

## 2020-03-04 DIAGNOSIS — J44.9 CHRONIC OBSTRUCTIVE PULMONARY DISEASE, UNSPECIFIED COPD TYPE (HCC): ICD-10-CM

## 2020-03-04 RX ORDER — SPIRONOLACTONE 25 MG/1
25 TABLET ORAL DAILY
Qty: 100 TAB | Refills: 1 | Status: SHIPPED | OUTPATIENT
Start: 2020-03-04 | End: 2020-10-21

## 2020-03-04 RX ORDER — MOMETASONE FUROATE AND FORMOTEROL FUMARATE DIHYDRATE 100; 5 UG/1; UG/1
1 AEROSOL RESPIRATORY (INHALATION) 2 TIMES DAILY
Qty: 2 INHALER | Refills: 2 | Status: SHIPPED | OUTPATIENT
Start: 2020-03-04 | End: 2021-05-11

## 2020-03-04 RX ORDER — LISINOPRIL 40 MG/1
40 TABLET ORAL DAILY
Qty: 100 TAB | Refills: 1 | Status: SHIPPED | OUTPATIENT
Start: 2020-03-04 | End: 2020-11-03

## 2020-03-04 RX ORDER — ROSUVASTATIN CALCIUM 10 MG/1
TABLET, COATED ORAL
Qty: 100 TAB | Refills: 1 | Status: SHIPPED | OUTPATIENT
Start: 2020-03-04 | End: 2020-12-29

## 2020-03-16 DIAGNOSIS — M51.36 DDD (DEGENERATIVE DISC DISEASE), LUMBAR: ICD-10-CM

## 2020-03-16 RX ORDER — CYCLOBENZAPRINE HCL 10 MG
TABLET ORAL
Qty: 90 TAB | Refills: 2 | Status: SHIPPED | OUTPATIENT
Start: 2020-03-16 | End: 2021-04-26

## 2020-03-16 RX ORDER — SERTRALINE HYDROCHLORIDE 100 MG/1
TABLET, FILM COATED ORAL
Qty: 90 TAB | Refills: 3 | Status: SHIPPED | OUTPATIENT
Start: 2020-03-16 | End: 2021-05-18

## 2020-03-18 ENCOUNTER — TELEPHONE (OUTPATIENT)
Dept: MEDICAL GROUP | Facility: LAB | Age: 75
End: 2020-03-18

## 2020-03-18 NOTE — TELEPHONE ENCOUNTER
Received a call from Suzan at [facility] in [place] [phone]  He says that you prescribed zoloft and flexeril and that they have an interaction together causing serotonin syndrome would like a call back to confirm that you still want to prescribed the two together.  Please advise

## 2020-03-18 NOTE — TELEPHONE ENCOUNTER
Okay for her to take these within a 24-hour time span.  Make sure that she never takes any medication such as Xanax as the 3 of these together put her at much higher risk for serotonin syndrome.  Please have her separate her sertraline and Flexeril by at least 4 hours.

## 2020-03-31 DIAGNOSIS — R06.02 SOB (SHORTNESS OF BREATH): ICD-10-CM

## 2020-03-31 DIAGNOSIS — E78.5 DM TYPE 2 WITH DIABETIC DYSLIPIDEMIA (HCC): ICD-10-CM

## 2020-03-31 DIAGNOSIS — E11.69 DM TYPE 2 WITH DIABETIC DYSLIPIDEMIA (HCC): ICD-10-CM

## 2020-03-31 RX ORDER — INSULIN GLARGINE 100 [IU]/ML
15 INJECTION, SOLUTION SUBCUTANEOUS EVERY EVENING
Qty: 15 ML | Refills: 5 | Status: SHIPPED | OUTPATIENT
Start: 2020-03-31 | End: 2020-11-24 | Stop reason: SDUPTHER

## 2020-03-31 RX ORDER — ALBUTEROL SULFATE 90 UG/1
1-2 AEROSOL, METERED RESPIRATORY (INHALATION) EVERY 6 HOURS PRN
Qty: 1 INHALER | Refills: 3 | Status: SHIPPED | OUTPATIENT
Start: 2020-03-31 | End: 2023-07-17 | Stop reason: SDUPTHER

## 2020-03-31 RX ORDER — METFORMIN HYDROCHLORIDE 500 MG/1
500 TABLET, EXTENDED RELEASE ORAL
Qty: 360 TAB | Refills: 0 | Status: SHIPPED | OUTPATIENT
Start: 2020-03-31 | End: 2021-05-05

## 2020-05-11 DIAGNOSIS — I10 HTN (HYPERTENSION), MALIGNANT: ICD-10-CM

## 2020-05-12 ENCOUNTER — TELEPHONE (OUTPATIENT)
Dept: MEDICAL GROUP | Facility: LAB | Age: 75
End: 2020-05-12

## 2020-05-12 RX ORDER — DILTIAZEM HYDROCHLORIDE 300 MG/1
300 CAPSULE, COATED, EXTENDED RELEASE ORAL DAILY
Qty: 90 CAP | Refills: 1 | Status: SHIPPED | OUTPATIENT
Start: 2020-05-12 | End: 2020-12-17 | Stop reason: SDUPTHER

## 2020-05-12 NOTE — TELEPHONE ENCOUNTER
[facility]   (DULERA) 100-5 MCG/ACT Aerosol Not covered by pt's Plan. Alternative;SYMBICORTAER, ADVAIRSCOTTYER, LEONOROEROXANNIPTKODY, EVANAER

## 2020-05-13 RX ORDER — BUDESONIDE AND FORMOTEROL FUMARATE DIHYDRATE 160; 4.5 UG/1; UG/1
2 AEROSOL RESPIRATORY (INHALATION) 2 TIMES DAILY
Qty: 1 INHALER | Refills: 5 | Status: SHIPPED | OUTPATIENT
Start: 2020-05-13 | End: 2020-12-28

## 2020-05-20 ENCOUNTER — TELEPHONE (OUTPATIENT)
Dept: CARDIOLOGY | Facility: MEDICAL CENTER | Age: 75
End: 2020-05-20

## 2020-05-20 NOTE — TELEPHONE ENCOUNTER
ROSAM for pt. Regarding upcoming visit w/ TT. Asked pt. To CB if she has had recent labs outside of [facility] so we can request results prior to her visit.

## 2020-05-28 RX ORDER — FLUCONAZOLE 150 MG/1
150 TABLET ORAL ONCE
Qty: 3 TAB | Refills: 0 | Status: SHIPPED | OUTPATIENT
Start: 2020-05-28 | End: 2021-06-01

## 2020-09-02 ENCOUNTER — TELEPHONE (OUTPATIENT)
Dept: MEDICAL GROUP | Facility: LAB | Age: 75
End: 2020-09-02

## 2020-09-02 NOTE — TELEPHONE ENCOUNTER
DOCUMENTATION OF PAR STATUS:    1. Name of Medication & Dose: flexeril     2. Name of Prescription Coverage Company & phone #: cover my my meds/ [id number]    3. Date Prior Auth Submitted: 9/2/20    4. What information was given to obtain insurance decision?     5. Prior Auth Status? APPROVED    6. Patient Notified: yes

## 2020-10-20 DIAGNOSIS — I10 HTN (HYPERTENSION), MALIGNANT: ICD-10-CM

## 2020-10-21 RX ORDER — SPIRONOLACTONE 25 MG/1
TABLET ORAL
Qty: 100 TAB | Refills: 0 | Status: SHIPPED | OUTPATIENT
Start: 2020-10-21 | End: 2020-12-30 | Stop reason: SDUPTHER

## 2020-11-01 DIAGNOSIS — I10 HTN (HYPERTENSION), MALIGNANT: ICD-10-CM

## 2020-11-03 RX ORDER — LISINOPRIL 40 MG/1
TABLET ORAL
Qty: 100 TAB | Refills: 0 | Status: SHIPPED | OUTPATIENT
Start: 2020-11-03 | End: 2020-12-30 | Stop reason: SDUPTHER

## 2020-11-24 ENCOUNTER — TELEMEDICINE (OUTPATIENT)
Dept: MEDICAL GROUP | Facility: LAB | Age: 75
End: 2020-11-24
Payer: MEDICARE

## 2020-11-24 DIAGNOSIS — R12 HEART BURN: ICD-10-CM

## 2020-11-24 DIAGNOSIS — I10 ESSENTIAL HYPERTENSION: ICD-10-CM

## 2020-11-24 DIAGNOSIS — Z79.4 TYPE 2 DIABETES MELLITUS WITHOUT COMPLICATION, WITH LONG-TERM CURRENT USE OF INSULIN (HCC): ICD-10-CM

## 2020-11-24 DIAGNOSIS — E11.9 TYPE 2 DIABETES MELLITUS WITHOUT COMPLICATION, WITH LONG-TERM CURRENT USE OF INSULIN (HCC): ICD-10-CM

## 2020-11-24 PROCEDURE — 99214 OFFICE O/P EST MOD 30 MIN: CPT | Mod: 95,CR | Performed by: NURSE PRACTITIONER

## 2020-11-24 RX ORDER — FLASH GLUCOSE SCANNING READER
1 EACH MISCELLANEOUS
Qty: 2 EACH | Refills: 3 | Status: SHIPPED | OUTPATIENT
Start: 2020-11-24 | End: 2020-11-24 | Stop reason: SDUPTHER

## 2020-11-24 RX ORDER — FLASH GLUCOSE SCANNING READER
1 EACH MISCELLANEOUS
Qty: 2 EACH | Refills: 3 | Status: SHIPPED | OUTPATIENT
Start: 2020-11-24 | End: 2021-05-11

## 2020-11-24 RX ORDER — FLASH GLUCOSE SENSOR
1 KIT MISCELLANEOUS
Qty: 2 EACH | Refills: 3 | Status: SHIPPED | OUTPATIENT
Start: 2020-11-24 | End: 2020-11-24 | Stop reason: SDUPTHER

## 2020-11-24 RX ORDER — FLASH GLUCOSE SENSOR
1 KIT MISCELLANEOUS
Qty: 2 EACH | Refills: 3 | Status: SHIPPED | OUTPATIENT
Start: 2020-11-24 | End: 2021-05-11

## 2020-11-24 RX ORDER — INSULIN GLARGINE 100 [IU]/ML
21 INJECTION, SOLUTION SUBCUTANEOUS EVERY EVENING
Qty: 15 ML | Refills: 5
Start: 2020-11-24 | End: 2021-05-11 | Stop reason: SDUPTHER

## 2020-11-24 NOTE — PROGRESS NOTES
"Telemedicine: Established Patient   This evaluation was conducted via Zoom using secure and encrypted videoconferencing technology. The patient was in a private location in the state of [place].    The patient's identity was confirmed and verbal consent was obtained for this virtual visit.    Subjective:   CC:   f/u    Kenia Oconnell is a 75 y.o. female presenting for evaluation and management of:    #1-Type 2 diabetes mellitus without complication, with long-term current use of insulin (HCC)  Chronic issue.  Overdue for labs but hesitant about this b/c of pandemic. Injecting lantus 21 units daily and metformin 500 mg XR daily.  Denies GI side effects of metformin.  Home BG - 250 this morning but hasn't been checking blood sugars lately.  Weight up to 126 lbs  -was down to 120 last year.  Exercise:  Limited d/t pandemic but \"cleaning house.\"  Occasional pain in feet -\" stab.\"  Denies foot wounds.  Denies vision changes.  Denies dysuria.      #2-Heart burn  New issue x 5-6 years, occurring mainly at night a few d per week and tums takes away her symptoms.  Rarely taking nsaids.      #3-HTN (Hypertension)  Chronic issue.  Not checking BP.  Denies CP or leg swelling (chronic).  Takes spironolactone for swelling which works.        ROS  Denies CP, n/v/d    Allergies   Allergen Reactions   • Amlodipine Swelling     LE edema   • Pcn [Penicillins] Rash     .   • Sulfa Drugs Rash     .   • Toprol Xl [Metoprolol]      Fatigue         Current medicines (including changes today)  Current Outpatient Medications   Medication Sig Dispense Refill   • lisinopril (PRINIVIL) 40 MG tablet TAKE 1 TABLET BY MOUTH EVERY  Tab 0   • spironolactone (ALDACTONE) 25 MG Tab TAKE 1 TABLET BY MOUTH EVERY  Tab 0   • budesonide-formoterol (SYMBICORT) 160-4.5 MCG/ACT Aerosol Inhale 2 Puffs by mouth 2 Times a Day. 1 Inhaler 5   • DILTIAZem CD (CARTIA XT) 300 MG CAPSULE SR 24 HR Take 1 Cap by mouth every day. 90 Cap 1   • " albuterol 108 (90 Base) MCG/ACT Aero Soln inhalation aerosol Inhale 1-2 Puffs by mouth every 6 hours as needed for Shortness of Breath. 1 Inhaler 3   • metFORMIN ER (GLUCOPHAGE XR) 500 MG TABLET SR 24 HR Take 1 Tab by mouth every day. 360 Tab 0   • insulin glargine (LANTUS SOLOSTAR) 100 UNIT/ML Solution Pen-injector injection Inject 15 Units as instructed every evening. 15 mL 5   • cyclobenzaprine (FLEXERIL) 10 MG Tab TAKE ONE-HALF TO ONE TABLET BY MOUTH AT BEDTIME AS NEEDED FOR PAIN / SPASMS 90 Tab 2   • sertraline (ZOLOFT) 100 MG Tab TAKE 1 TABLET BY MOUTH DAILY. 90 Tab 3   • rosuvastatin (CRESTOR) 10 MG Tab TAKE ONE TABLET BY MOUTH DAILY 100 Tab 1   • Mometasone Furo-Formoterol Fum (DULERA) 100-5 MCG/ACT Aerosol Inhale 1 Puff by mouth 2 Times a Day. 2 Inhaler 2   • JANUVIA 100 MG Tab TAKE ONE TABLET BY MOUTH DAILY 100 Tab 0   • NON SPECIFIED BD fine needles for Lantus pens  Inject lantus nightly. 90 Each 3   • Insulin Pen Needle 32 G x 4 mm Using one per day with Lantus injection 90 Each 3   • clobetasol (TEMOVATE) 0.05 % Ointment Apply 1 Application to affected area(s) 2 times a day. 60 g 11   • aspirin EC (ECOTRIN) 81 MG Tablet Delayed Response Take 81 mg by mouth every day.     • Polyethyl Glycol-Propyl Glycol (SYSTANE FREE OP) by Ophthalmic route.     • glucose blood (ONE TOUCH ULTRA TEST) strip USE TO TEST BLOOD SUGAR DAILY 100 Strip 11   • LIFESCAN FINEPOINT LANCETS MISC USE TO TEST BLOOD SUGAR DAILY 100 Each 11   • fluticasone (FLONASE) 50 MCG/ACT nasal spray USE ONE SPRAY IN EACH NOSTRIL DAILY 1 Bottle 5   • NON SPECIFIED by Other route. 1. Lancettes. Use daily #100 RF x 1 year  2. Glucometer test strips. Use daily #100 RF x 1 year.  Dx: 250.00 100 Each 1   • cetirizine (ZYRTEC) 10 MG TABS Take 10 mg by mouth every day.       • vitamin D (CHOLECALCIFEROL) 1000 UNIT TABS Take 1,000 Units by mouth every day.       No current facility-administered medications for this visit.        Patient Active Problem  List    Diagnosis Date Noted   • Type 2 diabetes mellitus without complication, with long-term current use of insulin ([facility]) 10/03/2018   • Major depressive disorder with single episode, in partial remission ([facility]) 03/14/2018   • Left ventricular hypertrophy 12/05/2017   • Fatty liver 12/05/2017   • Combined forms of age-related cataract of left eye 10/17/2017   • Bilateral ocular hypertension 07/06/2015   • Posterior vitreous detachment of right eye 07/06/2015   • Abnormal cardiovascular stress test 08/06/2014   • DDD (degenerative disc disease), lumbar 08/14/2013   • Seasonal allergic rhinitis 04/19/2010   • Vitamin D insufficiency 04/02/2010   • HTN (hypertension) 12/18/2009   • Hyperlipidemia with target LDL less than 100 06/11/2009   • COPD (chronic obstructive pulmonary disease) ([facility]) 06/11/2009   • History of Gout when on HCTZ 06/11/2009   • Cervical High Risk HPV Test Positive 10/18/2006       Family History   Problem Relation Age of Onset   • Heart Disease Father         d. MI 50s   • Hypertension Father    • Heart Attack Father    • Lung Disease Mother         d. lung dz   • Hypertension Mother    • Cancer Paternal Grandfather         Black Lung   • Diabetes Paternal Grandmother    • Heart Disease Sister 59        mi and CVA -    • Stroke Sister    • Heart Attack Brother    • Diabetes Other         Paternal Cousin DM1       She  has a past medical history of Abnormal stress electrocardiogram test using treadmill (8/11/2014), Allergic rhinitis (4/19/2010), Arthritis, Asthma, exogenous (6/11/2009), Bilateral ocular hypertension (7/6/2015), Cataract, Cervical High Risk HPV Test Positive (10/18/2006), COPD (chronic obstructive pulmonary disease) ([facility]) (6/11/2009), DDD (degenerative disc disease), lumbar (8/14/2013), Depression (6/11/2009), Emphysema of lung ([facility]), Glaucoma, Glaucoma suspect (6/11/2009), HDL deficiency (3/30/2012), Heart burn, History of Gout when on HCTZ (6/11/2009), Hyperlipidemia LDL goal <  100 (6/11/2009), Hyperplastic colon polyp (12/7/2007), Hypertension, Hypertriglyceridemia (3/30/2012), Personal history of allergy to eggs (4/2/2010), Posterior vitreous detachment of right eye (7/6/2015), Seizure (HCC) (1953), Type II or unspecified type diabetes mellitus without mention of complication, not stated as uncontrolled, and Vitamin d deficiency (4/2/2010). She also has no past medical history of Encounter for long-term (current) use of other medications.  She  has a past surgical history that includes tonsillectomy (1953); sinusotomies (1999); dilation and curettage (1981); eye surgery (1997); eye surgery (Bilateral, 2013); cataract phaco with iol (Left, 10/17/2017); and cataract phaco with iol (Right, 11/7/2017).       Objective:   LMP 01/01/2000     Physical Exam  Gen. appears healthy in no distress   Skin appropriate for sex and age   Neck trachea is midline  Lungs unlabored breathing  Heart regular rate  Neuro gait and station normal   Psych appropriate, calm, interactive, well-groomed  Assessment and Plan:   The following treatment plan was discussed:   1. Type 2 diabetes mellitus without complication, with long-term current use of insulin (HCC)  -Uncertain regarding her A1c as she is very skeptical to come into our office or go to the lab because of the pandemic.  She is agreeable to a full lab panel as soon as a vaccine is available.  She did inquire about the freestyle payal system and I optimistically sent this prescription to her local and mail order pharmacy in hopes that it is covered for her.  Discussed the benefits of a freestyle payal system in terms of freely checking her blood sugar whenever she would like and sending me copies of her blood sugar log via email.  Discussed the importance of checking her blood sugars more regularly, especially considering that she was up to 250 this morning.  Discussed that we can certainly increase her Lantus if needed.  Reminded her of the treatment of  hypoglycemia.  Continue Metformin.  Encouraged her to see her eye doctor as soon as the pandemic clears.  Discussed proper foot care.  Encouraged exercise and a diabetic diet.  - Comp Metabolic Panel; Future  - CBC WITH DIFFERENTIAL; Future  - HEMOGLOBIN A1C; Future  - MICROALBUMIN CREAT RATIO URINE; Future  - Lipid Profile; Future  - Continuous Blood Gluc  (FREESTYLE SEAN 14 DAY READER) Device; 1 Device every 14 days.  Dispense: 2 Each; Refill: 3  - Continuous Blood Gluc Sensor (FREESTYLE SEAN SENSOR SYSTEM) Misc; 1 Device every 14 days.  Dispense: 2 Each; Refill: 3  - insulin glargine (LANTUS SOLOSTAR) 100 UNIT/ML Solution Pen-injector injection; Inject 21 Units under the skin every evening.  Dispense: 15 mL; Refill: 5    2. Heart burn  -Stable with as needed Tums.  Offered upgrade prescription such as Pepcid or even a PPI which she declines.  Discussed causes of heartburn.  Fortunately denies black or bloody stools.  Follow-up as needed regarding this.    3. Essential hypertension  -Unknown stability.  Encouraged her to start checking her blood pressures at home.  Continue all current medications for now.  Encouraged her to email me blood pressure readings over the next few weeks as she does have a blood pressure monitor at home.        Follow-up: 6 months

## 2020-11-24 NOTE — ASSESSMENT & PLAN NOTE
"Chronic issue.  Injecting lantus 21 units daily and metformin 500 mg XR daily.  Denies GI side effects of metformin.  Home BG - 250 this morning but hasn't been checking blood sugars lately.  Weight up to 126 lbs  -was down to 120 last year.  Exercise:  Limited d/t pandemic but \"cleaning house.\"  Occasional pain in feet -\" stab.\"  Denies foot wounds.  Denies vision changes.  Denies dysuria.    "

## 2020-11-24 NOTE — ASSESSMENT & PLAN NOTE
New issue x 5-6 years, occurring mainly at night and tums takes away her symptoms.  Rarely taking nsaids.

## 2020-11-24 NOTE — ASSESSMENT & PLAN NOTE
Chronic issue.  Not checking BP.  Denies CP or leg swelling (chronic).  Takes spironolactone for swelling which works.

## 2020-12-17 DIAGNOSIS — I10 HTN (HYPERTENSION), MALIGNANT: ICD-10-CM

## 2020-12-17 RX ORDER — DILTIAZEM HYDROCHLORIDE 300 MG/1
300 CAPSULE, COATED, EXTENDED RELEASE ORAL DAILY
Qty: 90 CAP | Refills: 0 | Status: SHIPPED | OUTPATIENT
Start: 2020-12-17 | End: 2020-12-22 | Stop reason: SDUPTHER

## 2020-12-22 DIAGNOSIS — I10 HTN (HYPERTENSION), MALIGNANT: ICD-10-CM

## 2020-12-22 RX ORDER — DILTIAZEM HYDROCHLORIDE 300 MG/1
300 CAPSULE, COATED, EXTENDED RELEASE ORAL DAILY
Qty: 90 CAP | Refills: 0 | Status: SHIPPED | OUTPATIENT
Start: 2020-12-22 | End: 2020-12-30 | Stop reason: SDUPTHER

## 2020-12-26 DIAGNOSIS — Z82.49 FAMILY HISTORY OF EARLY CAD: ICD-10-CM

## 2020-12-28 RX ORDER — BUDESONIDE AND FORMOTEROL FUMARATE DIHYDRATE 160; 4.5 UG/1; UG/1
AEROSOL RESPIRATORY (INHALATION)
Qty: 10.2 G | Refills: 1 | Status: SHIPPED | OUTPATIENT
Start: 2020-12-28 | End: 2021-04-26

## 2020-12-28 NOTE — TELEPHONE ENCOUNTER
DOCUMENTATION OF PAR STATUS:    1. Name of Medication & Dose: CYCLOBENZAPRINE 10MG     2. Name of Prescription Coverage Company & phone #: [employer]    3. Date Prior Auth Submitted: 12/28/2020    4. What information was given to obtain insurance decision? OV notes faxed    5. Prior Auth Status? Pending    6. Patient Notified: N\A

## 2020-12-28 NOTE — TELEPHONE ENCOUNTER
Received request via: Pharmacy    Was the patient seen in the last year in this department? Yes  11/24/2020  Does the patient have an active prescription (recently filled or refills available) for medication(s) requested? No

## 2020-12-29 RX ORDER — ROSUVASTATIN CALCIUM 10 MG/1
TABLET, COATED ORAL
Qty: 90 TAB | Refills: 0 | Status: SHIPPED | OUTPATIENT
Start: 2020-12-29 | End: 2020-12-30 | Stop reason: SDUPTHER

## 2020-12-30 ENCOUNTER — TELEMEDICINE (OUTPATIENT)
Dept: CARDIOLOGY | Facility: MEDICAL CENTER | Age: 75
End: 2020-12-30
Payer: MEDICARE

## 2020-12-30 DIAGNOSIS — Z82.49 FAMILY HISTORY OF EARLY CAD: ICD-10-CM

## 2020-12-30 DIAGNOSIS — E11.65 TYPE 2 DIABETES MELLITUS WITH HYPERGLYCEMIA, WITH LONG-TERM CURRENT USE OF INSULIN (HCC): ICD-10-CM

## 2020-12-30 DIAGNOSIS — Z79.899 HIGH RISK MEDICATION USE: ICD-10-CM

## 2020-12-30 DIAGNOSIS — I10 HTN (HYPERTENSION), MALIGNANT: ICD-10-CM

## 2020-12-30 DIAGNOSIS — Z79.4 TYPE 2 DIABETES MELLITUS WITH HYPERGLYCEMIA, WITH LONG-TERM CURRENT USE OF INSULIN (HCC): ICD-10-CM

## 2020-12-30 DIAGNOSIS — E78.2 MIXED HYPERLIPIDEMIA: ICD-10-CM

## 2020-12-30 PROCEDURE — 99214 OFFICE O/P EST MOD 30 MIN: CPT | Mod: 95,CR | Performed by: INTERNAL MEDICINE

## 2020-12-30 RX ORDER — DILTIAZEM HYDROCHLORIDE 300 MG/1
300 CAPSULE, COATED, EXTENDED RELEASE ORAL DAILY
Qty: 90 CAP | Refills: 3 | Status: SHIPPED | OUTPATIENT
Start: 2020-12-30 | End: 2022-01-26

## 2020-12-30 RX ORDER — ROSUVASTATIN CALCIUM 10 MG/1
TABLET, COATED ORAL
Qty: 90 TAB | Refills: 3 | Status: SHIPPED | OUTPATIENT
Start: 2020-12-30 | End: 2022-01-03

## 2020-12-30 RX ORDER — LISINOPRIL 40 MG/1
40 TABLET ORAL
Qty: 100 TAB | Refills: 3 | Status: SHIPPED | OUTPATIENT
Start: 2020-12-30 | End: 2022-01-03 | Stop reason: SDUPTHER

## 2020-12-30 RX ORDER — SPIRONOLACTONE 25 MG/1
25 TABLET ORAL
Qty: 100 TAB | Refills: 3 | Status: SHIPPED | OUTPATIENT
Start: 2020-12-30 | End: 2021-07-01

## 2020-12-30 ASSESSMENT — ENCOUNTER SYMPTOMS
LOSS OF CONSCIOUSNESS: 0
HALLUCINATIONS: 0
HEADACHES: 0
ABDOMINAL PAIN: 0
FEVER: 0
CLAUDICATION: 0
BLURRED VISION: 0
PALPITATIONS: 0
MYALGIAS: 0
WEIGHT LOSS: 0
PND: 0
SENSORY CHANGE: 0
ORTHOPNEA: 0
FALLS: 0
NAUSEA: 0
COUGH: 0
BRUISES/BLEEDS EASILY: 0
BLOOD IN STOOL: 0
SPEECH CHANGE: 0
VOMITING: 0
DOUBLE VISION: 0
EYE PAIN: 0
DIZZINESS: 0
EYE DISCHARGE: 0
SHORTNESS OF BREATH: 0
DEPRESSION: 0
CHILLS: 0

## 2020-12-30 NOTE — PROGRESS NOTES
"Chief Complaint   Patient presents with   • HTN (Controlled)     virtual visit     Of note, this visit was done through telemedicine with video on demand through epic system.  This visit complies with Medicare guidelines during this current COVID-19 pandemic.    This encounter was conducted via Zoom.  Verbal consent was obtained. Patient's identity was verified.      Subjective:   Kenia Oconnell is a 75 y.o. female who presents today for cardiac care of CAD, HTN, HLP, DM.  In the interim, patient has been doing well without having any symptoms. Patient denies having chest pain, dyspnea, palpitation, presyncope, syncope episodes. Able to climb up at least 2 flights of stairs.     I have independently interpreted and reviewed blood tests results with patient in clinic which shows normal LDL level 60, elevated triglycerides 188, normal renal and liver function. No new tests.     Blood pressure is well controlled.    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   • Cataract     bilat   • Cervical High Risk HPV Test Positive 10/18/2006   • COPD (chronic obstructive pulmonary disease) ([facility]) 6/11/2009   • DDD (degenerative disc disease), lumbar 8/14/2013   • Depression 6/11/2009   • Emphysema of lung ([facility])    • Glaucoma    • Glaucoma suspect 6/11/2009   • HDL deficiency 3/30/2012   • Heart burn    • History of Gout when on HCTZ 6/11/2009   • Hyperlipidemia LDL goal < 100 6/11/2009   • Hyperplastic colon polyp 12/7/2007   • Hypertension    • Hypertriglyceridemia 3/30/2012   • Personal history of allergy to eggs 4/2/2010   • Posterior vitreous detachment of right eye 7/6/2015   • Seizure ([facility]) 1953    \"sun stroke\"   • Type II or unspecified type diabetes mellitus without mention of complication, not stated as uncontrolled     oral meds and " "insulin   • Vitamin d deficiency 2010     Past Surgical History:   Procedure Laterality Date   • CATARACT PHACO WITH IOL Right 2017    Procedure: CATARACT PHACO WITH IOL;  Surgeon: Carlos Arce M.D.;  Location: SURGERY SAME DAY [facility];  Service: Ophthalmology   • CATARACT PHACO WITH IOL Left 10/17/2017    Procedure: CATARACT PHACO WITH IOL;  Surgeon: Carlos Arce M.D.;  Location: SURGERY SAME DAY [facility];  Service: Ophthalmology   • EYE SURGERY Bilateral     Dr. Garrido   • SINUSOTOMIES     • EYE SURGERY      \"laser for eye pressure\"   • DILATION AND CURETTAGE     • TONSILLECTOMY       Family History   Problem Relation Age of Onset   • Heart Disease Father         d. MI 50s   • Hypertension Father    • Heart Attack Father    • Lung Disease Mother         d. lung dz   • Hypertension Mother    • Cancer Paternal Grandfather         Black Lung   • Diabetes Paternal Grandmother    • Heart Disease Sister 59        mi and CVA -    • Stroke Sister    • Heart Attack Brother    • Diabetes Other         Paternal Cousin DM1     Social History     Socioeconomic History   • Marital status:      Spouse name: Not on file   • Number of children: 1   • Years of education: Not on file   • Highest education level: Not on file   Occupational History   • Not on file   Social Needs   • Financial resource strain: Not on file   • Food insecurity     Worry: Not on file     Inability: Not on file   • Transportation needs     Medical: Not on file     Non-medical: Not on file   Tobacco Use   • Smoking status: Former Smoker     Packs/day: 1.00     Years: 29.00     Pack years: 29.00     Types: Cigarettes     Start date: 1966     Quit date: 1995     Years since quittin.0   • Smokeless tobacco: Never Used   Substance and Sexual Activity   • Alcohol use: Yes     Alcohol/week: 0.0 oz     Comment: 1 glass occasionally   • Drug use: Yes     Types: Marijuana   • Sexual activity: Yes     " Partners: Male   Lifestyle   • Physical activity     Days per week: Not on file     Minutes per session: Not on file   • Stress: Not on file   Relationships   • Social connections     Talks on phone: Not on file     Gets together: Not on file     Attends Hoahaoism service: Not on file     Active member of club or organization: Not on file     Attends meetings of clubs or organizations: Not on file     Relationship status: Not on file   • Intimate partner violence     Fear of current or ex partner: Not on file     Emotionally abused: Not on file     Physically abused: Not on file     Forced sexual activity: Not on file   Other Topics Concern   • Not on file   Social History Narrative    2013. . Has sig other for many years.      Allergies   Allergen Reactions   • Amlodipine Swelling     LE edema   • Pcn [Penicillins] Rash     .   • Sulfa Drugs Rash     .   • Toprol Xl [Metoprolol]      Fatigue       Outpatient Encounter Medications as of 12/30/2020   Medication Sig Dispense Refill   • rosuvastatin (CRESTOR) 10 MG Tab TAKE 1 TABLET BY MOUTH EVERY DAY 90 Tab 0   • SYMBICORT 160-4.5 MCG/ACT Aerosol INHALE 2 PUFFS BY MOUTH TWICE DAILY 10.2 g 1   • DILTIAZem CD (CARTIA XT) 300 MG CAPSULE SR 24 HR Take 1 Cap by mouth every day. 90 Cap 0   • insulin glargine (LANTUS SOLOSTAR) 100 UNIT/ML Solution Pen-injector injection Inject 21 Units under the skin every evening. 15 mL 5   • lisinopril (PRINIVIL) 40 MG tablet TAKE 1 TABLET BY MOUTH EVERY  Tab 0   • spironolactone (ALDACTONE) 25 MG Tab TAKE 1 TABLET BY MOUTH EVERY  Tab 0   • albuterol 108 (90 Base) MCG/ACT Aero Soln inhalation aerosol Inhale 1-2 Puffs by mouth every 6 hours as needed for Shortness of Breath. 1 Inhaler 3   • metFORMIN ER (GLUCOPHAGE XR) 500 MG TABLET SR 24 HR Take 1 Tab by mouth every day. 360 Tab 0   • cyclobenzaprine (FLEXERIL) 10 MG Tab TAKE ONE-HALF TO ONE TABLET BY MOUTH AT BEDTIME AS NEEDED FOR PAIN / SPASMS 90 Tab 2   •  sertraline (ZOLOFT) 100 MG Tab TAKE 1 TABLET BY MOUTH DAILY. 90 Tab 3   • clobetasol (TEMOVATE) 0.05 % Ointment Apply 1 Application to affected area(s) 2 times a day. 60 g 11   • aspirin EC (ECOTRIN) 81 MG Tablet Delayed Response Take 81 mg by mouth every day.     • Polyethyl Glycol-Propyl Glycol (SYSTANE FREE OP) by Ophthalmic route.     • fluticasone (FLONASE) 50 MCG/ACT nasal spray USE ONE SPRAY IN EACH NOSTRIL DAILY 1 Bottle 5   • cetirizine (ZYRTEC) 10 MG TABS Take 10 mg by mouth every day.       • vitamin D (CHOLECALCIFEROL) 1000 UNIT TABS Take 1,000 Units by mouth every day.     • Continuous Blood Gluc  (FREESTYLE SEAN 14 DAY READER) Device 1 Device every 14 days. 2 Each 3   • Continuous Blood Gluc Sensor (FREESTYLE SEAN SENSOR SYSTEM) Misc 1 Device every 14 days. 2 Each 3   • Mometasone Furo-Formoterol Fum (DULERA) 100-5 MCG/ACT Aerosol Inhale 1 Puff by mouth 2 Times a Day. (Patient not taking: Reported on 12/30/2020) 2 Inhaler 2   • NON SPECIFIED BD fine needles for Lantus pens  Inject lantus nightly. 90 Each 3   • Insulin Pen Needle 32 G x 4 mm Using one per day with Lantus injection 90 Each 3   • glucose blood (ONE TOUCH ULTRA TEST) strip USE TO TEST BLOOD SUGAR DAILY 100 Strip 11   • LIFESCAN FINEPOINT LANCETS MISC USE TO TEST BLOOD SUGAR DAILY 100 Each 11   • NON SPECIFIED by Other route. 1. Lancettes. Use daily #100 RF x 1 year  2. Glucometer test strips. Use daily #100 RF x 1 year.  Dx: 250.00 100 Each 1     No facility-administered encounter medications on file as of 12/30/2020.      Review of Systems   Constitutional: Negative for chills, fever, malaise/fatigue and weight loss.   HENT: Negative for ear discharge, ear pain, hearing loss and nosebleeds.    Eyes: Negative for blurred vision, double vision, pain and discharge.   Respiratory: Negative for cough and shortness of breath.    Cardiovascular: Negative for chest pain, palpitations, orthopnea, claudication, leg swelling and PND.    Gastrointestinal: Negative for abdominal pain, blood in stool, melena, nausea and vomiting.   Genitourinary: Negative for dysuria and hematuria.   Musculoskeletal: Negative for falls, joint pain and myalgias.   Skin: Negative for itching and rash.   Neurological: Negative for dizziness, sensory change, speech change, loss of consciousness and headaches.   Endo/Heme/Allergies: Negative for environmental allergies. Does not bruise/bleed easily.   Psychiatric/Behavioral: Negative for depression, hallucinations and suicidal ideas.        Objective:   LMP 01/01/2000   Blood pressure: 124/68  Physical Exam  Constitutional:   Well appearing, no acute distress  Heart: no pitting edema in BLE.  Lungs: no breathing distress, not tachypneic  Abdomen: no distention  Neurology: no signs of focal deficits.  Mentation is alert.    Assessment:     1. HTN (hypertension), malignant     2. Type 2 diabetes mellitus with hyperglycemia, with long-term current use of insulin (HCC)     3. Mixed hyperlipidemia     4. High risk medication use     5. Family history of early CAD         Medical Decision Making:  Today's Assessment / Status / Plan:   Today, based on physical examination findings, patient is euvolemic. No JVD, lungs are clear to auscultation, no pitting edema in bilateral lower extremities, no ascites.    Dry weight is 121 lbs.    Diet change to reduce triglyceride.    At this time patient is clinically stable in terms of her cardiac standpoint.    Continue current medications at current dose as above. Refills were prescribed today and patient was instructed with plan of care.

## 2021-04-24 DIAGNOSIS — M51.36 DDD (DEGENERATIVE DISC DISEASE), LUMBAR: ICD-10-CM

## 2021-04-26 RX ORDER — BUDESONIDE AND FORMOTEROL FUMARATE DIHYDRATE 160; 4.5 UG/1; UG/1
AEROSOL RESPIRATORY (INHALATION)
Qty: 10.2 G | Refills: 1 | Status: SHIPPED | OUTPATIENT
Start: 2021-04-26 | End: 2021-07-30 | Stop reason: SDUPTHER

## 2021-04-26 RX ORDER — CYCLOBENZAPRINE HCL 10 MG
TABLET ORAL
Qty: 90 TABLET | Refills: 2 | Status: SHIPPED | OUTPATIENT
Start: 2021-04-26 | End: 2022-07-25

## 2021-05-05 RX ORDER — METFORMIN HYDROCHLORIDE 500 MG/1
TABLET, EXTENDED RELEASE ORAL
Qty: 360 TABLET | Refills: 0 | Status: SHIPPED | OUTPATIENT
Start: 2021-05-05 | End: 2021-05-11 | Stop reason: SDUPTHER

## 2021-05-05 NOTE — TELEPHONE ENCOUNTER
Received request via: Pharmacy    Was the patient seen in the last year in this department? Yes  11/24/20  Does the patient have an active prescription (recently filled or refills available) for medication(s) requested? No

## 2021-05-11 ENCOUNTER — OFFICE VISIT (OUTPATIENT)
Dept: MEDICAL GROUP | Facility: LAB | Age: 76
End: 2021-05-11
Payer: COMMERCIAL

## 2021-05-11 VITALS
HEART RATE: 90 BPM | WEIGHT: 118 LBS | SYSTOLIC BLOOD PRESSURE: 94 MMHG | OXYGEN SATURATION: 96 % | RESPIRATION RATE: 16 BRPM | TEMPERATURE: 96.2 F | HEIGHT: 61 IN | DIASTOLIC BLOOD PRESSURE: 58 MMHG | BODY MASS INDEX: 22.28 KG/M2

## 2021-05-11 DIAGNOSIS — Z79.4 TYPE 2 DIABETES MELLITUS WITHOUT COMPLICATION, WITH LONG-TERM CURRENT USE OF INSULIN (HCC): ICD-10-CM

## 2021-05-11 DIAGNOSIS — M79.642 LEFT HAND PAIN: ICD-10-CM

## 2021-05-11 DIAGNOSIS — E11.9 TYPE 2 DIABETES MELLITUS WITHOUT COMPLICATION, WITH LONG-TERM CURRENT USE OF INSULIN (HCC): ICD-10-CM

## 2021-05-11 PROCEDURE — 99214 OFFICE O/P EST MOD 30 MIN: CPT | Performed by: NURSE PRACTITIONER

## 2021-05-11 RX ORDER — NEEDLES, DISPOSABLE 25GX5/8"
NEEDLE, DISPOSABLE MISCELLANEOUS
Qty: 100 EACH | Refills: 3 | Status: SHIPPED | OUTPATIENT
Start: 2021-05-11

## 2021-05-11 RX ORDER — INSULIN GLARGINE 100 [IU]/ML
25 INJECTION, SOLUTION SUBCUTANEOUS EVERY EVENING
Qty: 5 EACH | Refills: 5 | Status: SHIPPED | OUTPATIENT
Start: 2021-05-11 | End: 2021-09-30

## 2021-05-11 RX ORDER — METFORMIN HYDROCHLORIDE 500 MG/1
500 TABLET, EXTENDED RELEASE ORAL 2 TIMES DAILY
Qty: 180 TABLET | Refills: 3 | Status: SHIPPED | OUTPATIENT
Start: 2021-05-11 | End: 2022-03-28 | Stop reason: SDUPTHER

## 2021-05-12 NOTE — ASSESSMENT & PLAN NOTE
Chronic issue.  States blood sugar has not been good - 180 fasting in the morning.  Exercise: not much, mainly house work.  Diet:  Drinking real coke - one per day.  Eats brown rice mixed with black beans, pasta a few d per week, bread 2-3 d per week.  Enjoys sweets - this varies.    Drinks water mostly and 1/2 glass OJ per day.    Injecting lantus 21 units daily and taking metformin 500 mg XR daily

## 2021-05-12 NOTE — PROGRESS NOTES
"Chief Complaint   Patient presents with   • Hand Pain       HPI   Kenia is a 75-year-old established female here to follow-up on diabetes, also with complaint of left hand pain for the past week.  She was last seen virtually in November, in the office over a year ago.    Type 2 diabetes mellitus without complication, with long-term current use of insulin (HCC)  Chronic issue.  States blood sugar has not been good - 180 fasting in the morning.  Exercise: not much, mainly house work.  Diet:  Drinking real coke - one per day.  Eats brown rice mixed with black beans, pasta a few d per week, bread 2-3 d per week.  Enjoys sweets - this varies.    Drinks water mostly and 1/2 glass OJ per day.    Injecting lantus 21 units daily and taking metformin 500 mg XR daily.  Denies foot numbness or tingling.  Overdue for lab work, unsure of kidney disease.  Denies bowel or bladder problems.    Left hand pain:  New issue for the past week without injuries, traumas or falls.  Describes the pain as achy with occasional tingling in her fingers.  Denies chest pain, trouble breathing or headache.  Physically active around the house doing a lot of chores.  When she takes medication for ran such as Tylenol it works.    Past medical, surgical, family, and social history is reviewed and updated in Epic chart by me today.   Medications and allergies reviewed and updated in Epic chart by me today.     ROS:   As documented in history of present illness above    Exam:  BP (!) 94/58 (BP Location: Left arm, Patient Position: Sitting, BP Cuff Size: Adult)   Pulse 90   Temp (!) 35.7 °C (96.2 °F)   Resp 16   Ht 1.549 m (5' 1\")   Wt 53.5 kg (118 lb)   SpO2 96%   Constitutional: Alert, no distress, plus 3 vital signs  Skin:  Warm, dry, no rashes invisible areas  Eye: Equal, round and reactive, conjunctiva clear  Neck: Trachea midline, no masses, no thyromegaly  Respiratory: Unlabored respiration, lungs clear to auscultation, no wheezes, no " "rhonchi  Cardiovascular: Normal rate and rhythm.  Left hand: No erythema, deformity or swelling.  Full sensation with soft touch.  She is able to  her left hand easily.  She does have notable arthritis in most of her finger joints.  Psych: Alert, pleasant, well-groomed, normal affect    Assessment / Plan / Medical Decision makin. Type 2 diabetes mellitus without complication, with long-term current use of insulin (HCC)  -Unfortunately her diabetes is now out of control.  She has been eating a bit too many carbs/sugars during the pandemic and not exercising.  She plans on returning to an exercise routine within the next few weeks and cutting back on her carbs.  I increased her Lantus to 25 units daily and Metformin to twice daily.  She will email me blood sugar readings if they are consistently greater than 150 over the next 3 months.  Discussed the importance of seeing her eye doctor and performing good foot care.  I will see her back here in 3 months after a full lab panel has been drawn.  - metFORMIN ER (GLUCOPHAGE XR) 500 MG TABLET SR 24 HR; Take 1 tablet by mouth 2 times a day.  Dispense: 180 tablet; Refill: 3  - insulin glargine (LANTUS SOLOSTAR) 100 UNIT/ML Solution Pen-injector injection; Inject 25 Units under the skin every evening.  Dispense: 5 Each; Refill: 5  - NEEDLE, DISP, 27 G (BD DISP NEEDLES) 27G X 1/2\" Misc; Use to inject lantus nightly.  Dispense: 100 Each; Refill: 3    2. Left hand pain  -Likely arthritis.  Responding to Tylenol or ibuprofen and we discussed appropriate amounts.  Encouraged heat, stretches and topical anti-inflammatories.  She will notify me if this is not improving.  "

## 2021-05-18 RX ORDER — SERTRALINE HYDROCHLORIDE 100 MG/1
TABLET, FILM COATED ORAL
Qty: 90 TABLET | Refills: 3 | Status: SHIPPED | OUTPATIENT
Start: 2021-05-18 | End: 2022-06-27

## 2021-05-18 NOTE — TELEPHONE ENCOUNTER
Received request via: Pharmacy    Was the patient seen in the last year in this department? Yes  LOV 05/11/2021  Does the patient have an active prescription (recently filled or refills available) for medication(s) requested? No

## 2021-06-01 RX ORDER — FLUCONAZOLE 150 MG/1
TABLET ORAL
Qty: 3 TABLET | Refills: 0 | Status: SHIPPED | OUTPATIENT
Start: 2021-06-01 | End: 2021-09-30

## 2021-06-25 ENCOUNTER — HOSPITAL ENCOUNTER (OUTPATIENT)
Dept: LAB | Facility: MEDICAL CENTER | Age: 76
End: 2021-06-25
Attending: NURSE PRACTITIONER
Payer: COMMERCIAL

## 2021-06-25 ENCOUNTER — HOSPITAL ENCOUNTER (OUTPATIENT)
Dept: LAB | Facility: MEDICAL CENTER | Age: 76
End: 2021-06-25
Attending: INTERNAL MEDICINE
Payer: COMMERCIAL

## 2021-06-25 DIAGNOSIS — Z79.4 TYPE 2 DIABETES MELLITUS WITHOUT COMPLICATION, WITH LONG-TERM CURRENT USE OF INSULIN (HCC): ICD-10-CM

## 2021-06-25 DIAGNOSIS — E11.9 TYPE 2 DIABETES MELLITUS WITHOUT COMPLICATION, WITH LONG-TERM CURRENT USE OF INSULIN (HCC): ICD-10-CM

## 2021-06-25 DIAGNOSIS — E78.2 MIXED HYPERLIPIDEMIA: ICD-10-CM

## 2021-06-25 LAB
ALBUMIN SERPL BCP-MCNC: 4.2 G/DL (ref 3.2–4.9)
ALBUMIN SERPL BCP-MCNC: 4.2 G/DL (ref 3.2–4.9)
ALBUMIN/GLOB SERPL: 1.6 G/DL
ALBUMIN/GLOB SERPL: 1.6 G/DL
ALP SERPL-CCNC: 66 U/L (ref 30–99)
ALP SERPL-CCNC: 66 U/L (ref 30–99)
ALT SERPL-CCNC: 13 U/L (ref 2–50)
ALT SERPL-CCNC: 14 U/L (ref 2–50)
ANION GAP SERPL CALC-SCNC: 11 MMOL/L (ref 7–16)
ANION GAP SERPL CALC-SCNC: 11 MMOL/L (ref 7–16)
AST SERPL-CCNC: 19 U/L (ref 12–45)
AST SERPL-CCNC: 20 U/L (ref 12–45)
BASOPHILS # BLD AUTO: 1.1 % (ref 0–1.8)
BASOPHILS # BLD: 0.1 K/UL (ref 0–0.12)
BILIRUB SERPL-MCNC: 0.2 MG/DL (ref 0.1–1.5)
BILIRUB SERPL-MCNC: 0.2 MG/DL (ref 0.1–1.5)
BUN SERPL-MCNC: 21 MG/DL (ref 8–22)
BUN SERPL-MCNC: 22 MG/DL (ref 8–22)
CALCIUM SERPL-MCNC: 8.9 MG/DL (ref 8.5–10.5)
CALCIUM SERPL-MCNC: 9 MG/DL (ref 8.5–10.5)
CHLORIDE SERPL-SCNC: 109 MMOL/L (ref 96–112)
CHLORIDE SERPL-SCNC: 110 MMOL/L (ref 96–112)
CHOLEST SERPL-MCNC: 120 MG/DL (ref 100–199)
CHOLEST SERPL-MCNC: 122 MG/DL (ref 100–199)
CO2 SERPL-SCNC: 18 MMOL/L (ref 20–33)
CO2 SERPL-SCNC: 18 MMOL/L (ref 20–33)
CREAT SERPL-MCNC: 1.33 MG/DL (ref 0.5–1.4)
CREAT SERPL-MCNC: 1.35 MG/DL (ref 0.5–1.4)
EOSINOPHIL # BLD AUTO: 0.27 K/UL (ref 0–0.51)
EOSINOPHIL NFR BLD: 2.9 % (ref 0–6.9)
ERYTHROCYTE [DISTWIDTH] IN BLOOD BY AUTOMATED COUNT: 46.6 FL (ref 35.9–50)
FASTING STATUS PATIENT QL REPORTED: NORMAL
FASTING STATUS PATIENT QL REPORTED: NORMAL
GLOBULIN SER CALC-MCNC: 2.6 G/DL (ref 1.9–3.5)
GLOBULIN SER CALC-MCNC: 2.6 G/DL (ref 1.9–3.5)
GLUCOSE SERPL-MCNC: 111 MG/DL (ref 65–99)
GLUCOSE SERPL-MCNC: 112 MG/DL (ref 65–99)
HCT VFR BLD AUTO: 37.5 % (ref 37–47)
HDLC SERPL-MCNC: 44 MG/DL
HDLC SERPL-MCNC: 45 MG/DL
HGB BLD-MCNC: 12.2 G/DL (ref 12–16)
IMM GRANULOCYTES # BLD AUTO: 0.06 K/UL (ref 0–0.11)
IMM GRANULOCYTES NFR BLD AUTO: 0.6 % (ref 0–0.9)
LDLC SERPL CALC-MCNC: 50 MG/DL
LDLC SERPL CALC-MCNC: 50 MG/DL
LYMPHOCYTES # BLD AUTO: 1.67 K/UL (ref 1–4.8)
LYMPHOCYTES NFR BLD: 17.8 % (ref 22–41)
MCH RBC QN AUTO: 29 PG (ref 27–33)
MCHC RBC AUTO-ENTMCNC: 32.5 G/DL (ref 33.6–35)
MCV RBC AUTO: 89.3 FL (ref 81.4–97.8)
MONOCYTES # BLD AUTO: 0.96 K/UL (ref 0–0.85)
MONOCYTES NFR BLD AUTO: 10.2 % (ref 0–13.4)
NEUTROPHILS # BLD AUTO: 6.31 K/UL (ref 2–7.15)
NEUTROPHILS NFR BLD: 67.4 % (ref 44–72)
NRBC # BLD AUTO: 0 K/UL
NRBC BLD-RTO: 0 /100 WBC
PLATELET # BLD AUTO: 204 K/UL (ref 164–446)
PMV BLD AUTO: 11.9 FL (ref 9–12.9)
POTASSIUM SERPL-SCNC: 5.1 MMOL/L (ref 3.6–5.5)
POTASSIUM SERPL-SCNC: 5.2 MMOL/L (ref 3.6–5.5)
PROT SERPL-MCNC: 6.8 G/DL (ref 6–8.2)
PROT SERPL-MCNC: 6.8 G/DL (ref 6–8.2)
RBC # BLD AUTO: 4.2 M/UL (ref 4.2–5.4)
SODIUM SERPL-SCNC: 138 MMOL/L (ref 135–145)
SODIUM SERPL-SCNC: 139 MMOL/L (ref 135–145)
TRIGL SERPL-MCNC: 132 MG/DL (ref 0–149)
TRIGL SERPL-MCNC: 133 MG/DL (ref 0–149)
WBC # BLD AUTO: 9.4 K/UL (ref 4.8–10.8)

## 2021-06-25 PROCEDURE — 82043 UR ALBUMIN QUANTITATIVE: CPT

## 2021-06-25 PROCEDURE — 36415 COLL VENOUS BLD VENIPUNCTURE: CPT

## 2021-06-25 PROCEDURE — 80061 LIPID PANEL: CPT

## 2021-06-25 PROCEDURE — 80053 COMPREHEN METABOLIC PANEL: CPT

## 2021-06-25 PROCEDURE — 82570 ASSAY OF URINE CREATININE: CPT

## 2021-06-25 PROCEDURE — 80061 LIPID PANEL: CPT | Mod: 91

## 2021-06-25 PROCEDURE — 85025 COMPLETE CBC W/AUTO DIFF WBC: CPT

## 2021-06-25 PROCEDURE — 83036 HEMOGLOBIN GLYCOSYLATED A1C: CPT | Mod: GA

## 2021-06-25 PROCEDURE — 80053 COMPREHEN METABOLIC PANEL: CPT | Mod: 91

## 2021-06-26 LAB
CREAT UR-MCNC: 153.95 MG/DL
EST. AVERAGE GLUCOSE BLD GHB EST-MCNC: 192 MG/DL
HBA1C MFR BLD: 8.3 % (ref 4–5.6)
MICROALBUMIN UR-MCNC: <1.2 MG/DL
MICROALBUMIN/CREAT UR: NORMAL MG/G (ref 0–30)

## 2021-07-01 ENCOUNTER — OFFICE VISIT (OUTPATIENT)
Dept: CARDIOLOGY | Facility: MEDICAL CENTER | Age: 76
End: 2021-07-01

## 2021-07-01 VITALS
SYSTOLIC BLOOD PRESSURE: 92 MMHG | HEIGHT: 61 IN | DIASTOLIC BLOOD PRESSURE: 48 MMHG | WEIGHT: 118 LBS | OXYGEN SATURATION: 95 % | BODY MASS INDEX: 22.28 KG/M2 | HEART RATE: 70 BPM

## 2021-07-01 DIAGNOSIS — E11.65 TYPE 2 DIABETES MELLITUS WITH HYPERGLYCEMIA, WITH LONG-TERM CURRENT USE OF INSULIN (HCC): ICD-10-CM

## 2021-07-01 DIAGNOSIS — E78.2 MIXED HYPERLIPIDEMIA: ICD-10-CM

## 2021-07-01 DIAGNOSIS — I10 HTN (HYPERTENSION), MALIGNANT: ICD-10-CM

## 2021-07-01 DIAGNOSIS — Z79.899 HIGH RISK MEDICATION USE: ICD-10-CM

## 2021-07-01 DIAGNOSIS — J44.9 CHRONIC OBSTRUCTIVE PULMONARY DISEASE, UNSPECIFIED COPD TYPE (HCC): ICD-10-CM

## 2021-07-01 DIAGNOSIS — Z79.4 TYPE 2 DIABETES MELLITUS WITH HYPERGLYCEMIA, WITH LONG-TERM CURRENT USE OF INSULIN (HCC): ICD-10-CM

## 2021-07-01 PROCEDURE — 99214 OFFICE O/P EST MOD 30 MIN: CPT | Performed by: NURSE PRACTITIONER

## 2021-07-01 ASSESSMENT — ENCOUNTER SYMPTOMS
ORTHOPNEA: 0
BLOOD IN STOOL: 0
NAUSEA: 0
COUGH: 0
BRUISES/BLEEDS EASILY: 0
FALLS: 0
PALPITATIONS: 0
CLAUDICATION: 0
TINGLING: 0
VOMITING: 0
LOSS OF CONSCIOUSNESS: 0
MYALGIAS: 0
ABDOMINAL PAIN: 0
WEIGHT LOSS: 0
DIZZINESS: 0
BLURRED VISION: 0
PND: 0
SHORTNESS OF BREATH: 0
FEVER: 0

## 2021-07-01 ASSESSMENT — FIBROSIS 4 INDEX: FIB4 SCORE: 1.91

## 2021-07-01 NOTE — PROGRESS NOTES
"Chief Complaint   Patient presents with   • Hyperlipidemia   • COPD   • HTN (Controlled)       Subjective:   Yamile Oconnell is a 76 y.o. female who presents today hypertension, hyperlipidemia follow-up.  Patient was last seen by Dr. Flores on 12/30/2020.    Patient has past medical history of COPD, gout, T2DM    Today, reports that her blood pressure has been running low at home. Patient denies chest pain, shortness of breath, palpitations, dizziness/lightheadedness, orthopnea, PND or Edema.  Patient reports she is able to ambulate 1-2 flights of stairs without SANTIAGO.  Patient reports her weight is stable at 118 pounds.  Reviewed her labs from 6/25/2021; notable for potassium slightly elevated at 5.2.  With her blood pressure and his potassium level we will hold her spironolactone at this time; patient agreeable.  We will recheck her blood chemistry in 2 weeks.    Based on physical examination findings, patient is euvolemic. No JVD, lungs are clear to auscultation, no pitting edema in bilateral lower extremities, no ascites. Dry weight is 118 lbs.    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   • Cataract     bilat   • Cervical High Risk HPV Test Positive 10/18/2006   • COPD (chronic obstructive pulmonary disease) ([facility]) 6/11/2009   • DDD (degenerative disc disease), lumbar 8/14/2013   • Depression 6/11/2009   • Emphysema of lung ([facility])    • Glaucoma    • Glaucoma suspect 6/11/2009   • HDL deficiency 3/30/2012   • Heart burn    • History of Gout when on HCTZ 6/11/2009   • Hyperlipidemia LDL goal < 100 6/11/2009   • Hyperplastic colon polyp 12/7/2007   • Hypertension    • Hypertriglyceridemia 3/30/2012   • Personal history of allergy to eggs 4/2/2010   • Posterior vitreous detachment of right eye 7/6/2015   • Seizure ([facility]) 1953    \"sun " "stroke\"   • Type II or unspecified type diabetes mellitus without mention of complication, not stated as uncontrolled     oral meds and insulin   • Vitamin d deficiency 2010     Past Surgical History:   Procedure Laterality Date   • CATARACT PHACO WITH IOL Right 2017    Procedure: CATARACT PHACO WITH IOL;  Surgeon: Carlos Arce M.D.;  Location: SURGERY SAME DAY [facility];  Service: Ophthalmology   • CATARACT PHACO WITH IOL Left 10/17/2017    Procedure: CATARACT PHACO WITH IOL;  Surgeon: Carlos Arce M.D.;  Location: SURGERY SAME DAY [facility];  Service: Ophthalmology   • EYE SURGERY Bilateral     Dr. Garrido   • SINUSOTOMIES     • EYE SURGERY      \"laser for eye pressure\"   • DILATION AND CURETTAGE     • TONSILLECTOMY       Family History   Problem Relation Age of Onset   • Heart Disease Father         d. MI 50s   • Hypertension Father    • Heart Attack Father    • Lung Disease Mother         d. lung dz   • Hypertension Mother    • Cancer Paternal Grandfather         Black Lung   • Diabetes Paternal Grandmother    • Heart Disease Sister 59        mi and CVA -    • Stroke Sister    • Heart Attack Brother    • Diabetes Other         Paternal Cousin DM1     Social History     Socioeconomic History   • Marital status:      Spouse name: Not on file   • Number of children: 1   • Years of education: Not on file   • Highest education level: Not on file   Occupational History   • Not on file   Tobacco Use   • Smoking status: Former Smoker     Packs/day: 1.00     Years: 29.00     Pack years: 29.00     Types: Cigarettes     Start date: 1966     Quit date: 1995     Years since quittin.5   • Smokeless tobacco: Never Used   Substance and Sexual Activity   • Alcohol use: Yes     Alcohol/week: 0.0 oz     Comment: 1 glass occasionally   • Drug use: Yes     Types: Marijuana   • Sexual activity: Yes     Partners: Male   Other Topics Concern   • Not on file   Social History " "Narrative    2013. . Has sig other for many years.      Social Determinants of Health     Financial Resource Strain:    • Difficulty of Paying Living Expenses:    Food Insecurity:    • Worried About Running Out of Food in the Last Year:    • Ran Out of Food in the Last Year:    Transportation Needs:    • Lack of Transportation (Medical):    • Lack of Transportation (Non-Medical):    Physical Activity:    • Days of Exercise per Week:    • Minutes of Exercise per Session:    Stress:    • Feeling of Stress :    Social Connections:    • Frequency of Communication with Friends and Family:    • Frequency of Social Gatherings with Friends and Family:    • Attends Zoroastrianism Services:    • Active Member of Clubs or Organizations:    • Attends Club or Organization Meetings:    • Marital Status:    Intimate Partner Violence:    • Fear of Current or Ex-Partner:    • Emotionally Abused:    • Physically Abused:    • Sexually Abused:      Allergies   Allergen Reactions   • Amlodipine Swelling     LE edema   • Pcn [Penicillins] Rash     .   • Sulfa Drugs Rash     .   • Toprol Xl [Metoprolol]      Fatigue       Outpatient Encounter Medications as of 7/1/2021   Medication Sig Dispense Refill   • fluconazole (DIFLUCAN) 150 MG tablet TAKE 1 TABLET BY MOUTH 1 TIME FOR 1 DOSE. REPEAT IN 72 HOURS 2 TIMES 3 tablet 0   • sertraline (ZOLOFT) 100 MG Tab TAKE 1 TABLET BY MOUTH DAILY 90 tablet 3   • metFORMIN ER (GLUCOPHAGE XR) 500 MG TABLET SR 24 HR Take 1 tablet by mouth 2 times a day. 180 tablet 3   • insulin glargine (LANTUS SOLOSTAR) 100 UNIT/ML Solution Pen-injector injection Inject 25 Units under the skin every evening. 5 Each 5   • NEEDLE, DISP, 27 G (BD DISP NEEDLES) 27G X 1/2\" Misc Use to inject lantus nightly. 100 Each 3   • SYMBICORT 160-4.5 MCG/ACT Aerosol INHALE 2 PUFFS BY MOUTH TWICE DAILY 10.2 g 1   • cyclobenzaprine (FLEXERIL) 10 mg Tab TAKE 1/2 TO 1 TABLET BY MOUTH AT BEDTIME AS NEEDED FOR PAIN OR SPASMS 90 tablet 2 "   • rosuvastatin (CRESTOR) 10 MG Tab TAKE 1 TABLET BY MOUTH EVERY DAY 90 Tab 3   • lisinopril (PRINIVIL) 40 MG tablet Take 1 Tab by mouth every day. 100 Tab 3   • DILTIAZem CD (CARTIA XT) 300 MG CAPSULE SR 24 HR Take 1 Cap by mouth every day. 90 Cap 3   • albuterol 108 (90 Base) MCG/ACT Aero Soln inhalation aerosol Inhale 1-2 Puffs by mouth every 6 hours as needed for Shortness of Breath. 1 Inhaler 3   • NON SPECIFIED BD fine needles for Lantus pens  Inject lantus nightly. 90 Each 3   • Insulin Pen Needle 32 G x 4 mm Using one per day with Lantus injection 90 Each 3   • clobetasol (TEMOVATE) 0.05 % Ointment Apply 1 Application to affected area(s) 2 times a day. 60 g 11   • aspirin EC (ECOTRIN) 81 MG Tablet Delayed Response Take 81 mg by mouth every day.     • Polyethyl Glycol-Propyl Glycol (SYSTANE FREE OP) by Ophthalmic route.     • glucose blood (ONE TOUCH ULTRA TEST) strip USE TO TEST BLOOD SUGAR DAILY 100 Strip 11   • LIFESCAN FINEPOINT LANCETS MISC USE TO TEST BLOOD SUGAR DAILY 100 Each 11   • fluticasone (FLONASE) 50 MCG/ACT nasal spray USE ONE SPRAY IN EACH NOSTRIL DAILY 1 Bottle 5   • NON SPECIFIED by Other route. 1. Lancettes. Use daily #100 RF x 1 year  2. Glucometer test strips. Use daily #100 RF x 1 year.  Dx: 250.00 100 Each 1   • cetirizine (ZYRTEC) 10 MG TABS Take 10 mg by mouth every day.       • vitamin D (CHOLECALCIFEROL) 1000 UNIT TABS Take 1,000 Units by mouth every day.     • [DISCONTINUED] spironolactone (ALDACTONE) 25 MG Tab Take 1 Tab by mouth every day. 100 Tab 3     No facility-administered encounter medications on file as of 7/1/2021.     Review of Systems   Constitutional: Negative for fever, malaise/fatigue and weight loss.   Eyes: Negative for blurred vision.   Respiratory: Negative for cough and shortness of breath.    Cardiovascular: Negative for chest pain, palpitations, orthopnea, claudication, leg swelling and PND.   Gastrointestinal: Negative for abdominal pain, blood in stool,  "nausea and vomiting.   Genitourinary: Negative for dysuria, frequency and hematuria.   Musculoskeletal: Negative for falls and myalgias.   Neurological: Negative for dizziness, tingling and loss of consciousness.   Endo/Heme/Allergies: Does not bruise/bleed easily.        Objective:   BP (!) 92/48 (BP Location: Right arm, Patient Position: Sitting, BP Cuff Size: Adult)   Pulse 70   Ht 1.549 m (5' 1\")   Wt 53.5 kg (118 lb)   LMP 01/01/2000   SpO2 95%   BMI 22.30 kg/m²     Physical Exam   Constitutional: She is oriented to person, place, and time. She appears well-developed.   HENT:   Head: Normocephalic and atraumatic.   Eyes: Pupils are equal, round, and reactive to light.   Neck: No JVD present.   Cardiovascular: Normal rate, regular rhythm and normal heart sounds. Exam reveals no gallop and no friction rub.   No murmur heard.  Pulmonary/Chest: Effort normal and breath sounds normal. No respiratory distress.   Abdominal: Soft. Bowel sounds are normal. She exhibits no distension.   Neurological: She is alert and oriented to person, place, and time.   Skin: Skin is warm and dry. No erythema.   Psychiatric: Her behavior is normal. Mood normal.   Vitals reviewed.    Lab Results   Component Value Date/Time    CHOLSTRLTOT 120 06/25/2021 02:11 PM    CHOLSTRLTOT 122 06/25/2021 02:11 PM    LDL 50 06/25/2021 02:11 PM    LDL 50 06/25/2021 02:11 PM    HDL 44 06/25/2021 02:11 PM    HDL 45 06/25/2021 02:11 PM    TRIGLYCERIDE 132 06/25/2021 02:11 PM    TRIGLYCERIDE 133 06/25/2021 02:11 PM       Lab Results   Component Value Date/Time    SODIUM 138 06/25/2021 02:11 PM    SODIUM 139 06/25/2021 02:11 PM    POTASSIUM 5.2 06/25/2021 02:11 PM    POTASSIUM 5.1 06/25/2021 02:11 PM    CHLORIDE 109 06/25/2021 02:11 PM    CHLORIDE 110 06/25/2021 02:11 PM    CO2 18 (L) 06/25/2021 02:11 PM    CO2 18 (L) 06/25/2021 02:11 PM    GLUCOSE 111 (H) 06/25/2021 02:11 PM    GLUCOSE 112 (H) 06/25/2021 02:11 PM    BUN 21 06/25/2021 02:11 PM    BUN " 22 06/25/2021 02:11 PM    CREATININE 1.35 06/25/2021 02:11 PM    CREATININE 1.33 06/25/2021 02:11 PM    CREATININE 1.07 (H) 12/27/2010 10:12 AM    BUNCREATRAT 14 12/27/2010 10:12 AM    GLOMRATE 52 (L) 12/27/2010 10:12 AM     Lab Results   Component Value Date/Time    ALKPHOSPHAT 66 06/25/2021 02:11 PM    ALKPHOSPHAT 66 06/25/2021 02:11 PM    ASTSGOT 20 06/25/2021 02:11 PM    ASTSGOT 19 06/25/2021 02:11 PM    ALTSGPT 14 06/25/2021 02:11 PM    ALTSGPT 13 06/25/2021 02:11 PM    TBILIRUBIN 0.2 06/25/2021 02:11 PM    TBILIRUBIN 0.2 06/25/2021 02:11 PM      Cardiac treadmill (8/25/2014): EXERCISE TREADMILL STRESS TEST:  1.  Baseline 12-lead ECG, normal sinus rhythm, rate 70, voltage criteria for   left ventricular hypertrophy.  2.  Completed 4 minutes 5 seconds of standard Jay protocol achieving 7.0   mets and a peak heart rate of 147 beats per minute, which is 96% of maximum   age predicted heart rate.  The patient had shortness of breath, but no chest   pressure.  There was lateral ischemic ST segment depression.  3.  Hypertensive response to exercise, the baseline blood pressure 169/89.    Peak blood pressure is 211/84.  Final blood pressure is 180/84.    Isolated PVCs in the recovery phase.     CONCLUSION:  Abnormal exercise treadmill stress test.    PFT (2/13/2020):   INTERPRETATION:  1.  Spirometry is consistent with a mild obstructive ventilatory defect.    There is no significant response to bronchodilators.  The measured MVV is   reduced proportionately to the degree of reduction in FEV1.    2.  There is no significant restrictive ventilatory defect.  The elevated   RV/TLC ratio is suggestive of air trapping.    3.  There is a mild diffusion impairment.  Reduced DLCO and normal DLCO/VA   ratio is consistent with obstructive airway disease and underestimation of the   alveolar volumes due to mild distribution of ventilation.    4.  Compared to prior testing in 2016, FEV1 has declined from 1.49 liters to   1.39  liters.    Echo (4/10/2019):   Prior echocardiogram 8/22/2014.  Normal left ventricular systolic function.   No evidence of valvular abnormality based on Doppler evaluation.   Ascending aorta diameter is 2.8 cm.  Compared to the images of the prior study done -  there has been no   significant change.     EKG (10/13/2017): Personally interpreted by me as sinus rhythm with LVH    Assessment:     1. HTN (hypertension), malignant  Basic Metabolic Panel   2. High risk medication use  Basic Metabolic Panel   3. Type 2 diabetes mellitus with hyperglycemia, with long-term current use of insulin (HCC)  Basic Metabolic Panel   4. Mixed hyperlipidemia     5. Chronic obstructive pulmonary disease, unspecified COPD type (HCC)         Medical Decision Making:  Today's Assessment / Status / Plan:   Hypertension  -Continue diltiazem 300 mg daily  -Continue lisinopril 40 mg daily  -Hold spironolactone at this time.  Consider reinitiation at reduced dose after blood work.  -Today in office blood pressure is well controlled.    -Home blood pressure recordings are reported as 90/50's.  Encouraged to continue home BP monitoring/log.    Hyperlipidemia  -Continue baby aspirin  -Continue rosuvastatin 10 mg daily    T2DM; COPD  -PFT reviewed per above  -A1C 8.3%  -Per PCP    High risk medication use  -This includes diltiazem and lisinopril  -Will continue to closely monitor for side effects of patient's high risk medication(s) including liver, renal function and electrolytes  -Close monitoring discussed with patient.  Lab work ordered.    FU in clinic in 1 month. Sooner if needed.    Patient verbalizes understanding and agrees with the plan of care.     Collaborating MD: Dr. Zak MD    PLEASE NOTE: This Note was created using voice recognition Software. I have made every reasonable attempt to correct obvious errors, but I expect that there are errors of grammar and possibly content that I did not discover before finalizing the  note

## 2021-07-30 RX ORDER — BUDESONIDE AND FORMOTEROL FUMARATE DIHYDRATE 160; 4.5 UG/1; UG/1
AEROSOL RESPIRATORY (INHALATION)
Qty: 10.2 G | Refills: 1 | Status: SHIPPED | OUTPATIENT
Start: 2021-07-30 | End: 2021-09-02

## 2021-09-02 RX ORDER — BUDESONIDE AND FORMOTEROL FUMARATE DIHYDRATE 160; 4.5 UG/1; UG/1
AEROSOL RESPIRATORY (INHALATION)
Qty: 10.2 G | Refills: 1 | Status: SHIPPED | OUTPATIENT
Start: 2021-09-02 | End: 2021-10-14

## 2021-09-27 ENCOUNTER — HOSPITAL ENCOUNTER (OUTPATIENT)
Dept: LAB | Facility: MEDICAL CENTER | Age: 76
End: 2021-09-27
Attending: NURSE PRACTITIONER
Payer: COMMERCIAL

## 2021-09-27 DIAGNOSIS — Z79.4 TYPE 2 DIABETES MELLITUS WITH HYPERGLYCEMIA, WITH LONG-TERM CURRENT USE OF INSULIN (HCC): ICD-10-CM

## 2021-09-27 DIAGNOSIS — E11.65 TYPE 2 DIABETES MELLITUS WITH HYPERGLYCEMIA, WITH LONG-TERM CURRENT USE OF INSULIN (HCC): ICD-10-CM

## 2021-09-27 DIAGNOSIS — I10 HTN (HYPERTENSION), MALIGNANT: ICD-10-CM

## 2021-09-27 DIAGNOSIS — Z79.899 HIGH RISK MEDICATION USE: ICD-10-CM

## 2021-09-27 LAB
ANION GAP SERPL CALC-SCNC: 10 MMOL/L (ref 7–16)
BUN SERPL-MCNC: 28 MG/DL (ref 8–22)
CALCIUM SERPL-MCNC: 9 MG/DL (ref 8.5–10.5)
CHLORIDE SERPL-SCNC: 107 MMOL/L (ref 96–112)
CO2 SERPL-SCNC: 18 MMOL/L (ref 20–33)
CREAT SERPL-MCNC: 1.11 MG/DL (ref 0.5–1.4)
GLUCOSE SERPL-MCNC: 220 MG/DL (ref 65–99)
POTASSIUM SERPL-SCNC: 5.3 MMOL/L (ref 3.6–5.5)
SODIUM SERPL-SCNC: 135 MMOL/L (ref 135–145)

## 2021-09-27 PROCEDURE — 36415 COLL VENOUS BLD VENIPUNCTURE: CPT

## 2021-09-27 PROCEDURE — 80048 BASIC METABOLIC PNL TOTAL CA: CPT

## 2021-09-30 ENCOUNTER — OFFICE VISIT (OUTPATIENT)
Dept: MEDICAL GROUP | Facility: LAB | Age: 76
End: 2021-09-30
Payer: COMMERCIAL

## 2021-09-30 VITALS
OXYGEN SATURATION: 95 % | DIASTOLIC BLOOD PRESSURE: 52 MMHG | BODY MASS INDEX: 22.84 KG/M2 | TEMPERATURE: 96.9 F | HEIGHT: 61 IN | SYSTOLIC BLOOD PRESSURE: 110 MMHG | RESPIRATION RATE: 16 BRPM | HEART RATE: 66 BPM | WEIGHT: 121 LBS

## 2021-09-30 DIAGNOSIS — Z79.4 TYPE 2 DIABETES MELLITUS WITHOUT COMPLICATION, WITH LONG-TERM CURRENT USE OF INSULIN (HCC): ICD-10-CM

## 2021-09-30 DIAGNOSIS — N18.32 STAGE 3B CHRONIC KIDNEY DISEASE: ICD-10-CM

## 2021-09-30 DIAGNOSIS — Z12.11 SPECIAL SCREENING FOR MALIGNANT NEOPLASM OF COLON: ICD-10-CM

## 2021-09-30 DIAGNOSIS — Z12.31 ENCOUNTER FOR SCREENING MAMMOGRAM FOR BREAST CANCER: ICD-10-CM

## 2021-09-30 DIAGNOSIS — Z23 NEED FOR INFLUENZA VACCINATION: ICD-10-CM

## 2021-09-30 DIAGNOSIS — E11.9 TYPE 2 DIABETES MELLITUS WITHOUT COMPLICATION, WITH LONG-TERM CURRENT USE OF INSULIN (HCC): ICD-10-CM

## 2021-09-30 LAB
HBA1C MFR BLD: 7.2 % (ref 0–5.6)
INT CON NEG: NEGATIVE
INT CON POS: POSITIVE

## 2021-09-30 PROCEDURE — 90662 IIV NO PRSV INCREASED AG IM: CPT | Performed by: NURSE PRACTITIONER

## 2021-09-30 PROCEDURE — 99214 OFFICE O/P EST MOD 30 MIN: CPT | Mod: 25 | Performed by: NURSE PRACTITIONER

## 2021-09-30 PROCEDURE — G0008 ADMIN INFLUENZA VIRUS VAC: HCPCS | Performed by: NURSE PRACTITIONER

## 2021-09-30 PROCEDURE — 99999 POCT A1C: CPT | Performed by: NURSE PRACTITIONER

## 2021-09-30 RX ORDER — INSULIN GLARGINE 100 [IU]/ML
20 INJECTION, SOLUTION SUBCUTANEOUS EVERY EVENING
Qty: 5 EACH | Refills: 5
Start: 2021-09-30 | End: 2022-05-23

## 2021-09-30 ASSESSMENT — PATIENT HEALTH QUESTIONNAIRE - PHQ9
3. TROUBLE FALLING OR STAYING ASLEEP OR SLEEPING TOO MUCH: SEVERAL DAYS
4. FEELING TIRED OR HAVING LITTLE ENERGY: NOT AT ALL
8. MOVING OR SPEAKING SO SLOWLY THAT OTHER PEOPLE COULD HAVE NOTICED. OR THE OPPOSITE, BEING SO FIGETY OR RESTLESS THAT YOU HAVE BEEN MOVING AROUND A LOT MORE THAN USUAL: NOT AT ALL
CLINICAL INTERPRETATION OF PHQ2 SCORE: 0
SUM OF ALL RESPONSES TO PHQ9 QUESTIONS 1 AND 2: 1
SUM OF ALL RESPONSES TO PHQ QUESTIONS 1-9: 3
7. TROUBLE CONCENTRATING ON THINGS, SUCH AS READING THE NEWSPAPER OR WATCHING TELEVISION: SEVERAL DAYS
2. FEELING DOWN, DEPRESSED, IRRITABLE, OR HOPELESS: NOT AT ALL
5. POOR APPETITE OR OVEREATING: NOT AT ALL
6. FEELING BAD ABOUT YOURSELF - OR THAT YOU ARE A FAILURE OR HAVE LET YOURSELF OR YOUR FAMILY DOWN: NOT AL ALL
9. THOUGHTS THAT YOU WOULD BE BETTER OFF DEAD, OR OF HURTING YOURSELF: NOT AT ALL
1. LITTLE INTEREST OR PLEASURE IN DOING THINGS: SEVERAL DAYS

## 2021-09-30 ASSESSMENT — FIBROSIS 4 INDEX: FIB4 SCORE: 1.91

## 2021-09-30 NOTE — ASSESSMENT & PLAN NOTE
Chronic issue for the patient.  Last A1c in June was at 8.3 and I requested she return today for A1c recheck.  She injects 25 units of Lantus at night and takes Metformin 500 mg twice daily.  Home blood sugar checks:  Numbness and tingling in feet:  Urinary or bowel issues:  Eye exam:  Vaccines:

## 2021-09-30 NOTE — PROGRESS NOTES
CC  Diabetes follow-up    MANDA Ovalle is a 76-year-old established female here for A1c recheck and to follow-up on chronic kidney disease, initially diagnosed about 2 and half years ago. Overall feeling really well..    #1-type 2 diabetes mellitus without complication, with long-term current use of insulin (HCC)  Chronic issue for the patient.  Last A1c in June was at 8.3 and I requested she return today for A1c recheck.  She injects 21 units of Lantus at night and takes Metformin 500 mg twice daily -I did increase her Lantus after her last visit to 25 units but she states that this is dropping her blood sugars too low so she reduced herself back to 21 units after a few weeks.  She realized that she was frequently forgetting lantus and metformin prior to June and remembering her medication on a regular basis has improved her blood sugars over the past 3 months.    Home blood sugar checks: typically below 100 and up to 119 fasting in the morning.  Numbness and tingling in feet: Denies. Is not followed by podiatry at this time, has been in the past for severe calluses on her feet. Taking care of her own feet now and happy with this.  Urinary or bowel issues: denies issues with diarrhea or dysuria.  Eye exam: UTD - no retinopathy  Vaccines: UTD except for flu  Has one real coke per day    #2-Chronic kidney disease: Has comorbid conditions of hypertension, type 2 diabetes and heart disease. She also has COPD but no longer smokes. Fortunately cardiology repeated her kidney function testing at the end of September and her GFR had improved from 38-48 with normal creatinine of 1.11, down from 1.35 in June. She rarely takes ibuprofen, stating that occasionally once per month she will take ibuprofen for left shoulder pain.    Past medical, surgical, family, and social history is reviewed and updated in Epic chart by me today.   Medications and allergies reviewed and updated in Epic chart by me today.     ROS:   As documented  "in history of present illness above    Exam:  /52 (BP Location: Left arm, Patient Position: Sitting, BP Cuff Size: Adult)   Pulse 66   Temp 36.1 °C (96.9 °F)   Resp 16   Ht 1.549 m (5' 1\")   Wt 54.9 kg (121 lb)   SpO2 95%     Constitutional: Alert, no distress, plus 3 vital signs  Skin:  Warm, dry, no rashes invisible areas  Eye: Equal, round and reactive, conjunctiva clear  ENMT: Lips without lesions, good dentition, oropharynx clear    Neck: Trachea midline, no masses, no thyromegaly  Respiratory: Unlabored respiration, lungs clear to auscultation, no wheezes, no rhonchi  Cardiovascular: Normal rate and rhythm, no murmur, no edema  Abdomen: Soft, nontender, no masses or hepatosplenomegaly  Psych: Alert, pleasant, well-groomed, normal affect  Monofilament testing with a 10 gram force: sensation intact: intact bilaterally  Visual Inspection: Feet have bilateral pretty significant calluses to her heels without open wounds or any surrounding erythema  Pedal pulses: intact bilaterally    Assessment / Plan / Medical Decision makin. Type 2 diabetes mellitus without complication, with long-term current use of insulin ([facility])  -A1c much improved today at 7.2%. Reduced Lantus to 20 units and metformin continued at 500 mg twice daily. Offered referral to podiatry which she declines. She feels comfortable taking care of her own feet. We discussed the need to see me with any foot wounds. Eye appointment is up-to-date and she does not have retinopathy. Updated flu shot today. Covid vaccines are up-to-date. Recommend returning in 6 months after labs have been done for CMP and A1c. Reminded her of the treatment of hypoglycemia.  - POCT  A1C  - HEMOGLOBIN A1C; Future  - insulin glargine (LANTUS SOLOSTAR) 100 UNIT/ML Solution Pen-injector injection; Inject 20 Units under the skin every evening.  Dispense: 5 Each; Refill: 5    2. Stage 3b chronic kidney disease (HCC)  -Discussed avoiding NSAIDs and following a " low-sodium diet. Encouraged her to stay well-hydrated. Discussed tight diabetes and blood pressure control, both of which are achieved today. Follow-up 6 months. - Comp Metabolic Panel; Future    3. Special screening for malignant neoplasm of colon  -Declines referral for colonoscopy today, stating she prefers to wait until Covid pandemic Is resolved. Denies unintentional weight loss, hematochezia or chronic abdominal pain.    4. Encounter for screening mammogram for breast cancer  -Recommend updated mammogram.  - MA-SCREENING MAMMO BILAT W/CAD; Future    5. Need for influenza vaccination  - Influenza Vaccine, High Dose (65+ Only)

## 2021-10-14 RX ORDER — BUDESONIDE AND FORMOTEROL FUMARATE DIHYDRATE 160; 4.5 UG/1; UG/1
AEROSOL RESPIRATORY (INHALATION)
Qty: 10.2 G | Refills: 1 | Status: SHIPPED | OUTPATIENT
Start: 2021-10-14 | End: 2022-01-07 | Stop reason: SDUPTHER

## 2021-10-14 NOTE — TELEPHONE ENCOUNTER
Received request via: Pharmacy    Was the patient seen in the last year in this department? Yes  LOV 09/30/2021  Does the patient have an active prescription (recently filled or refills available) for medication(s) requested? No

## 2021-10-20 ENCOUNTER — PATIENT MESSAGE (OUTPATIENT)
Dept: CARDIOLOGY | Facility: MEDICAL CENTER | Age: 76
End: 2021-10-20

## 2021-10-20 DIAGNOSIS — Z79.899 HIGH RISK MEDICATION USE: ICD-10-CM

## 2021-10-21 DIAGNOSIS — R63.5 WEIGHT GAIN: ICD-10-CM

## 2021-10-21 RX ORDER — FUROSEMIDE 20 MG/1
TABLET ORAL
Qty: 45 TABLET | Refills: 3 | Status: SHIPPED | OUTPATIENT
Start: 2021-10-21 | End: 2022-10-20

## 2021-10-21 RX ORDER — SPIRONOLACTONE 25 MG/1
12.5 TABLET ORAL DAILY
Qty: 45 TABLET | Refills: 3 | Status: SHIPPED | OUTPATIENT
Start: 2021-10-21 | End: 2022-08-22 | Stop reason: SDUPTHER

## 2021-10-21 RX ORDER — FUROSEMIDE 20 MG/1
10 TABLET ORAL
Qty: 30 TABLET | Refills: 11 | Status: SHIPPED | OUTPATIENT
Start: 2021-10-21 | End: 2021-10-21

## 2021-10-21 NOTE — PATIENT COMMUNICATION
Potassium level still elevated.  Prescribe furosemide 10 mg daily for 5 days, no potassium supplementation.  May prescribe furosemide 10 mg daily as needed for weight gain greater than 3 pounds 1 day or 5 pounds 1 week, 30 tablets 5 refills.  Then patient may resume spironolactone 12.5 mg daily next week.     Follow-up BMP in 2 weeks     Thank you    Message text

## 2022-01-02 DIAGNOSIS — Z82.49 FAMILY HISTORY OF EARLY CAD: ICD-10-CM

## 2022-01-03 DIAGNOSIS — I10 HTN (HYPERTENSION), MALIGNANT: ICD-10-CM

## 2022-01-04 RX ORDER — LISINOPRIL 40 MG/1
40 TABLET ORAL
Qty: 90 TABLET | Refills: 1 | Status: SHIPPED | OUTPATIENT
Start: 2022-01-04 | End: 2022-05-03

## 2022-01-04 RX ORDER — ROSUVASTATIN CALCIUM 10 MG/1
TABLET, COATED ORAL
Qty: 90 TABLET | Refills: 1 | Status: SHIPPED | OUTPATIENT
Start: 2022-01-04 | End: 2022-05-03

## 2022-01-07 RX ORDER — BUDESONIDE AND FORMOTEROL FUMARATE DIHYDRATE 160; 4.5 UG/1; UG/1
2 AEROSOL RESPIRATORY (INHALATION) 2 TIMES DAILY
Qty: 10.2 G | Refills: 1 | Status: SHIPPED | OUTPATIENT
Start: 2022-01-07 | End: 2022-01-10

## 2022-01-07 NOTE — TELEPHONE ENCOUNTER
Received request via: Pharmacy    Was the patient seen in the last year in this department? Yes  9/30/2021  Does the patient have an active prescription (recently filled or refills available) for medication(s) requested? No

## 2022-01-10 RX ORDER — BUDESONIDE AND FORMOTEROL FUMARATE DIHYDRATE 160; 4.5 UG/1; UG/1
AEROSOL RESPIRATORY (INHALATION)
Qty: 30.6 G | Refills: 2 | Status: SHIPPED | OUTPATIENT
Start: 2022-01-10 | End: 2022-02-15

## 2022-01-25 DIAGNOSIS — I10 HTN (HYPERTENSION), MALIGNANT: ICD-10-CM

## 2022-01-26 RX ORDER — DILTIAZEM HYDROCHLORIDE 300 MG/1
300 CAPSULE, COATED, EXTENDED RELEASE ORAL
Qty: 90 CAPSULE | Refills: 0 | Status: SHIPPED | OUTPATIENT
Start: 2022-01-26 | End: 2022-06-06 | Stop reason: SDUPTHER

## 2022-02-15 RX ORDER — BUDESONIDE AND FORMOTEROL FUMARATE DIHYDRATE 160; 4.5 UG/1; UG/1
AEROSOL RESPIRATORY (INHALATION)
Qty: 30.6 G | Refills: 2 | Status: SHIPPED | OUTPATIENT
Start: 2022-02-15 | End: 2023-03-17 | Stop reason: SDUPTHER

## 2022-03-28 DIAGNOSIS — E11.9 TYPE 2 DIABETES MELLITUS WITHOUT COMPLICATION, WITH LONG-TERM CURRENT USE OF INSULIN (HCC): ICD-10-CM

## 2022-03-28 DIAGNOSIS — Z79.4 TYPE 2 DIABETES MELLITUS WITHOUT COMPLICATION, WITH LONG-TERM CURRENT USE OF INSULIN (HCC): ICD-10-CM

## 2022-03-29 RX ORDER — METFORMIN HYDROCHLORIDE 500 MG/1
500 TABLET, EXTENDED RELEASE ORAL 2 TIMES DAILY
Qty: 180 TABLET | Refills: 3 | Status: SHIPPED | OUTPATIENT
Start: 2022-03-29 | End: 2023-05-23

## 2022-04-15 ENCOUNTER — HOSPITAL ENCOUNTER (OUTPATIENT)
Dept: LAB | Facility: MEDICAL CENTER | Age: 77
End: 2022-04-15
Attending: NURSE PRACTITIONER

## 2022-04-15 ENCOUNTER — HOSPITAL ENCOUNTER (OUTPATIENT)
Dept: LAB | Facility: MEDICAL CENTER | Age: 77
End: 2022-04-15
Attending: NURSE PRACTITIONER
Payer: MEDICARE

## 2022-04-15 DIAGNOSIS — N18.32 STAGE 3B CHRONIC KIDNEY DISEASE: ICD-10-CM

## 2022-04-15 LAB
ALBUMIN SERPL BCP-MCNC: 4.9 G/DL (ref 3.2–4.9)
ALBUMIN/GLOB SERPL: 2 G/DL
ALP SERPL-CCNC: 81 U/L (ref 30–99)
ALT SERPL-CCNC: 20 U/L (ref 2–50)
ANION GAP SERPL CALC-SCNC: 12 MMOL/L (ref 7–16)
AST SERPL-CCNC: 21 U/L (ref 12–45)
BILIRUB SERPL-MCNC: 0.2 MG/DL (ref 0.1–1.5)
BUN SERPL-MCNC: 17 MG/DL (ref 8–22)
CALCIUM SERPL-MCNC: 9.7 MG/DL (ref 8.5–10.5)
CHLORIDE SERPL-SCNC: 109 MMOL/L (ref 96–112)
CO2 SERPL-SCNC: 19 MMOL/L (ref 20–33)
CREAT SERPL-MCNC: 1.5 MG/DL (ref 0.5–1.4)
GFR SERPLBLD CREATININE-BSD FMLA CKD-EPI: 36 ML/MIN/1.73 M 2
GLOBULIN SER CALC-MCNC: 2.4 G/DL (ref 1.9–3.5)
GLUCOSE SERPL-MCNC: 89 MG/DL (ref 65–99)
POTASSIUM SERPL-SCNC: 4.9 MMOL/L (ref 3.6–5.5)
PROT SERPL-MCNC: 7.3 G/DL (ref 6–8.2)
SODIUM SERPL-SCNC: 140 MMOL/L (ref 135–145)

## 2022-04-15 PROCEDURE — 80053 COMPREHEN METABOLIC PANEL: CPT

## 2022-04-15 PROCEDURE — 36415 COLL VENOUS BLD VENIPUNCTURE: CPT

## 2022-04-21 ENCOUNTER — OFFICE VISIT (OUTPATIENT)
Dept: MEDICAL GROUP | Facility: LAB | Age: 77
End: 2022-04-21

## 2022-04-21 VITALS
BODY MASS INDEX: 23.22 KG/M2 | DIASTOLIC BLOOD PRESSURE: 78 MMHG | TEMPERATURE: 96.4 F | HEART RATE: 72 BPM | HEIGHT: 61 IN | SYSTOLIC BLOOD PRESSURE: 118 MMHG | RESPIRATION RATE: 16 BRPM | WEIGHT: 123 LBS | OXYGEN SATURATION: 95 %

## 2022-04-21 DIAGNOSIS — E78.5 HYPERLIPIDEMIA WITH TARGET LDL LESS THAN 100: ICD-10-CM

## 2022-04-21 DIAGNOSIS — E11.9 TYPE 2 DIABETES MELLITUS WITHOUT COMPLICATION, WITH LONG-TERM CURRENT USE OF INSULIN (HCC): ICD-10-CM

## 2022-04-21 DIAGNOSIS — I10 PRIMARY HYPERTENSION: ICD-10-CM

## 2022-04-21 DIAGNOSIS — L84 FOOT CALLUS: ICD-10-CM

## 2022-04-21 DIAGNOSIS — Z79.4 TYPE 2 DIABETES MELLITUS WITHOUT COMPLICATION, WITH LONG-TERM CURRENT USE OF INSULIN (HCC): ICD-10-CM

## 2022-04-21 DIAGNOSIS — N18.32 STAGE 3B CHRONIC KIDNEY DISEASE: ICD-10-CM

## 2022-04-21 LAB
HBA1C MFR BLD: 7.8 % (ref 0–5.6)
INT CON NEG: ABNORMAL
INT CON POS: ABNORMAL

## 2022-04-21 PROCEDURE — 83036 HEMOGLOBIN GLYCOSYLATED A1C: CPT | Performed by: NURSE PRACTITIONER

## 2022-04-21 PROCEDURE — 99214 OFFICE O/P EST MOD 30 MIN: CPT | Performed by: NURSE PRACTITIONER

## 2022-04-21 ASSESSMENT — PATIENT HEALTH QUESTIONNAIRE - PHQ9: CLINICAL INTERPRETATION OF PHQ2 SCORE: 0

## 2022-04-21 ASSESSMENT — FIBROSIS 4 INDEX: FIB4 SCORE: 1.7

## 2022-04-21 NOTE — PROGRESS NOTES
"CC  f/u      HPI:  76-year-old established female here to follow-up on chronic issues of type 2 diabetes, hypertension and hyperlipidemia.  She was last seen in September 2021.   Currently injecting 20 units of Lantus at night and takes 500 mg of metformin once daily despite rx of bid.  Home blood sugar checks:148 today after tea, yesterday 80.    Foot issues:occasional burning  Bowel or bladder issues: denies  Eye exam is up-to-date.  Due for shingles vaccine.  Exercise: yard work  covid shots are UTD.    HTN : chronic issue.  Not checking home blood pressures.  Has occasional ankle swelling.  Denies CP or SOB on exertion.       Labs: Did CMP prior to arrival which shows BUN/creatinine of 17/1.5 and a GFR of 36.  Over the past 3 yrs - bun / cr highest 1.35/20.    Not regularly taking ibuprofen.    Drinks only 1 glass of water per day.   Last a1c was 7.2, 6 mo ago.       Exam:  /78 (BP Location: Left arm, Patient Position: Sitting, BP Cuff Size: Adult)   Pulse 72   Temp (!) 35.8 °C (96.4 °F) (Temporal)   Resp 16   Ht 1.549 m (5' 1\")   Wt 55.8 kg (123 lb)   SpO2 95%   Well developed, well nourished female in no apparent distress.  Eyes: Conjunctivae and sclerae are clear  Neck: Supple  CV: Regular rate and rhythm  Pulm: Clear to auscultation.  Psychiatric: The patient is alert and oriented in all four spheres.  She has a bit of word recollection difficulty during exam today.  Mood is euthymic. Affect is appropriate for the situation.  Skin: No rashes were noted.  Musculoskeletal: Gait is normal. Patient is able to transfer from sitting position to exam table without assistance.  Feet: No open wounds but she is covered in calluses to the dorsal aspects of both feet.      A/P:  \"  1. Hyperlipidemia with target LDL less than 100     2. Primary hypertension     3. Type 2 diabetes mellitus without complication, with long-term current use of insulin ([facility])  POCT  A1C    Referral to Podiatry   4. Foot callus  " Referral to Podiatry   5. Stage 3b chronic kidney disease (HCC)       Encouraged her to establish with a podiatrist, especially considering copious amounts of calluses/growths and her diabetes, referral placed.  Blood pressure very well controlled.  Diabetes stable at 7.8%, discussed goal around 7.0.  Encouraged continued efforts at exercise and a strict diabetic diet.  Encouraged her to take her metformin as prescribed by taking 1000 mg a day.  May continue to 20 units of Lantus at night.  Recheck A1c 3 months.  Discussed kidney function testing, suspect this is multifaceted between hypertension, type 2 diabetes, diuretic therapy and very low water intake.  We discussed the importance of increasing her water intake, encouraged her to try to get at least 32 ounces, preferably 50 ounces of water per day, limiting salt and caffeine.  Recheck kidney function testing in just 3 months.    Follow-up 6 months.  Encouraged her to schedule follow-up with cardiology.

## 2022-04-21 NOTE — PATIENT INSTRUCTIONS
Go to the lab at end of July and have them draw the A1C and bmp in the computer system.     Increase water intake.     Take 2 metformin together once daily with food.

## 2022-05-22 DIAGNOSIS — Z79.4 TYPE 2 DIABETES MELLITUS WITHOUT COMPLICATION, WITH LONG-TERM CURRENT USE OF INSULIN (HCC): ICD-10-CM

## 2022-05-22 DIAGNOSIS — E11.9 TYPE 2 DIABETES MELLITUS WITHOUT COMPLICATION, WITH LONG-TERM CURRENT USE OF INSULIN (HCC): ICD-10-CM

## 2022-05-23 RX ORDER — FLURBIPROFEN SODIUM 0.3 MG/ML
SOLUTION/ DROPS OPHTHALMIC
Qty: 100 EACH | Refills: 1 | Status: SHIPPED | OUTPATIENT
Start: 2022-05-23 | End: 2023-02-22

## 2022-05-23 RX ORDER — INSULIN GLARGINE 100 [IU]/ML
INJECTION, SOLUTION SUBCUTANEOUS
Qty: 15 ML | Refills: 1 | Status: SHIPPED | OUTPATIENT
Start: 2022-05-23 | End: 2023-01-20 | Stop reason: SDUPTHER

## 2022-06-06 DIAGNOSIS — I10 HTN (HYPERTENSION), MALIGNANT: ICD-10-CM

## 2022-06-08 RX ORDER — DILTIAZEM HYDROCHLORIDE 300 MG/1
300 CAPSULE, COATED, EXTENDED RELEASE ORAL
Qty: 90 CAPSULE | Refills: 0 | Status: SHIPPED | OUTPATIENT
Start: 2022-06-08 | End: 2022-08-22 | Stop reason: SDUPTHER

## 2022-06-27 RX ORDER — SERTRALINE HYDROCHLORIDE 100 MG/1
TABLET, FILM COATED ORAL
Qty: 90 TABLET | Refills: 3 | Status: SHIPPED | OUTPATIENT
Start: 2022-06-27 | End: 2023-07-20

## 2022-06-27 NOTE — TELEPHONE ENCOUNTER
Received request via: Pharmacy  4/21/2022lov  Was the patient seen in the last year in this department? Yes    Does the patient have an active prescription (recently filled or refills available) for medication(s) requested? No

## 2022-07-24 DIAGNOSIS — M51.36 DDD (DEGENERATIVE DISC DISEASE), LUMBAR: ICD-10-CM

## 2022-07-25 RX ORDER — CYCLOBENZAPRINE HCL 10 MG
TABLET ORAL
Qty: 90 TABLET | Refills: 2 | Status: SHIPPED | OUTPATIENT
Start: 2022-07-25 | End: 2023-07-20

## 2022-08-22 ENCOUNTER — OFFICE VISIT (OUTPATIENT)
Dept: CARDIOLOGY | Facility: MEDICAL CENTER | Age: 77
End: 2022-08-22

## 2022-08-22 VITALS
RESPIRATION RATE: 16 BRPM | DIASTOLIC BLOOD PRESSURE: 48 MMHG | HEIGHT: 61 IN | BODY MASS INDEX: 21.71 KG/M2 | SYSTOLIC BLOOD PRESSURE: 96 MMHG | HEART RATE: 66 BPM | WEIGHT: 115 LBS | OXYGEN SATURATION: 96 %

## 2022-08-22 DIAGNOSIS — Z79.899 HIGH RISK MEDICATION USE: ICD-10-CM

## 2022-08-22 DIAGNOSIS — I51.7 LEFT VENTRICULAR HYPERTROPHY: ICD-10-CM

## 2022-08-22 DIAGNOSIS — I10 HTN (HYPERTENSION), MALIGNANT: ICD-10-CM

## 2022-08-22 DIAGNOSIS — I10 PRIMARY HYPERTENSION: ICD-10-CM

## 2022-08-22 DIAGNOSIS — E78.5 HYPERLIPIDEMIA WITH TARGET LDL LESS THAN 100: ICD-10-CM

## 2022-08-22 DIAGNOSIS — J44.9 CHRONIC OBSTRUCTIVE PULMONARY DISEASE, UNSPECIFIED COPD TYPE (HCC): ICD-10-CM

## 2022-08-22 DIAGNOSIS — Z82.49 FAMILY HISTORY OF EARLY CAD: ICD-10-CM

## 2022-08-22 PROCEDURE — 99213 OFFICE O/P EST LOW 20 MIN: CPT | Performed by: NURSE PRACTITIONER

## 2022-08-22 RX ORDER — SPIRONOLACTONE 25 MG/1
12.5 TABLET ORAL DAILY
Qty: 45 TABLET | Refills: 3 | Status: SHIPPED | OUTPATIENT
Start: 2022-08-22 | End: 2023-07-17

## 2022-08-22 RX ORDER — AMMONIUM LACTATE 12 G/100G
LOTION TOPICAL
COMMUNITY
Start: 2022-07-21

## 2022-08-22 RX ORDER — LISINOPRIL 40 MG/1
40 TABLET ORAL
Qty: 90 TABLET | Refills: 0 | Status: SHIPPED | OUTPATIENT
Start: 2022-08-22 | End: 2022-10-20 | Stop reason: SDUPTHER

## 2022-08-22 RX ORDER — DILTIAZEM HYDROCHLORIDE 300 MG/1
300 CAPSULE, COATED, EXTENDED RELEASE ORAL
Qty: 90 CAPSULE | Refills: 3 | Status: SHIPPED | OUTPATIENT
Start: 2022-08-22 | End: 2023-08-03 | Stop reason: SDUPTHER

## 2022-08-22 RX ORDER — ROSUVASTATIN CALCIUM 10 MG/1
10 TABLET, COATED ORAL EVERY EVENING
Qty: 90 TABLET | Refills: 3 | Status: SHIPPED | OUTPATIENT
Start: 2022-08-22 | End: 2023-08-03 | Stop reason: SDUPTHER

## 2022-08-22 ASSESSMENT — ENCOUNTER SYMPTOMS
SHORTNESS OF BREATH: 0
DIZZINESS: 0
ORTHOPNEA: 0
BLURRED VISION: 0
FALLS: 0
COUGH: 0
TINGLING: 0
ABDOMINAL PAIN: 0
PND: 0
FEVER: 0
BRUISES/BLEEDS EASILY: 0
LOSS OF CONSCIOUSNESS: 0
CLAUDICATION: 0
VOMITING: 0
PALPITATIONS: 0
BLOOD IN STOOL: 0
WEIGHT LOSS: 0
NAUSEA: 0
MYALGIAS: 0

## 2022-08-22 ASSESSMENT — FIBROSIS 4 INDEX: FIB4 SCORE: 1.73

## 2022-08-22 NOTE — PROGRESS NOTES
Chief Complaint   Patient presents with    Hypertension    Hyperlipidemia       Subjective:   Yamile Oconnell is a 76 y.o. female who presents today hypertension, hyperlipidemia follow-up.  Patient was last seen by myself on 7/1/2021 and Dr. Flores on 12/30/2020.    Patient has past medical history of COPD, gout, T2DM    Patient feels well, denies chest pain, shortness of breath, palpitations, dizziness/lightheadedness, orthopnea, PND or Edema.  Patient reports her blood pressure stable at home and she only uses the Lasix as needed for 1 to 2 days whenever she experiences increased bilateral lower extremity edema.  Patient appears euvolemic on exam.    Patient notes she has not completed previously ordered testing due to low blood sugar.  We will order nonfasting BMP to evaluate her electrolyte and renal function.  Otherwise we will continue her previously prescribed medications with no dosing changes.  We discussed patient's experience dizziness with low blood pressure to contact office and consider lowering the dosing on her medications for blood pressure.  Patient verbalizes understanding and agreeable to plan of care.    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    Cataract     bilat    Cervical High Risk HPV Test Positive 10/18/2006    COPD (chronic obstructive pulmonary disease) (HCC) 6/11/2009    DDD (degenerative disc disease), lumbar 8/14/2013    Depression 6/11/2009    Emphysema of lung (HCC)     Glaucoma     Glaucoma suspect 6/11/2009    HDL deficiency 3/30/2012    Heart burn     History of Gout when on HCTZ 6/11/2009    Hyperlipidemia LDL goal < 100 6/11/2009    Hyperplastic colon polyp 12/7/2007    Hypertension     Hypertriglyceridemia 3/30/2012    Personal history of allergy to eggs 4/2/2010    Posterior vitreous  "detachment of right eye 2015    Seizure (HCC)     \"sun stroke\"    Type II or unspecified type diabetes mellitus without mention of complication, not stated as uncontrolled     oral meds and insulin    Vitamin d deficiency 2010     Past Surgical History:   Procedure Laterality Date    CATARACT PHACO WITH IOL Right 2017    Procedure: CATARACT PHACO WITH IOL;  Surgeon: Carlos Arce M.D.;  Location: [facility];  Service: Ophthalmology    CATARACT PHACO WITH IOL Left 10/17/2017    Procedure: CATARACT PHACO WITH IOL;  Surgeon: Carlos Arce M.D.;  Location: [facility];  Service: Ophthalmology    EYE SURGERY Bilateral 2013    Dr. Garrido    SINUSOTOMIES      EYE SURGERY      \"laser for eye pressure\"    DILATION AND CURETTAGE      TONSILLECTOMY       Family History   Problem Relation Age of Onset    Heart Disease Father         d. MI 50s    Hypertension Father     Heart Attack Father     Lung Disease Mother         d. lung dz    Hypertension Mother     Cancer Paternal Grandfather         Black Lung    Diabetes Paternal Grandmother     Heart Disease Sister 59        mi and CVA -     Stroke Sister     Heart Attack Brother     Diabetes Other         Paternal Cousin DM1     Social History     Socioeconomic History    Marital status:      Spouse name: Not on file    Number of children: 1    Years of education: Not on file    Highest education level: Not on file   Occupational History    Not on file   Tobacco Use    Smoking status: Former     Packs/day: 1.00     Years: 29.00     Pack years: 29.00     Types: Cigarettes     Start date: 1966     Quit date: 1995     Years since quittin.6    Smokeless tobacco: Never   Substance and Sexual Activity    Alcohol use: Yes     Alcohol/week: 0.0 oz     Comment: 1 glass occasionally    Drug use: Yes     Types: Marijuana    Sexual activity: Yes     Partners: Male   Other Topics Concern    Not on file " "  Social History Narrative    2013. . Has sig other for many years.      Social Determinants of Health     Financial Resource Strain: Not on file   Food Insecurity: Not on file   Transportation Needs: Not on file   Physical Activity: Not on file   Stress: Not on file   Social Connections: Not on file   Intimate Partner Violence: Not on file   Housing Stability: Not on file     Allergies   Allergen Reactions    Amlodipine Swelling     LE edema    Pcn [Penicillins] Rash     .    Sulfa Drugs Rash     .    Toprol Xl [Metoprolol]      Fatigue       Outpatient Encounter Medications as of 8/22/2022   Medication Sig Dispense Refill    DILTIAZem CD (CARDIZEM CD) 300 MG CAPSULE SR 24 HR Take 1 Capsule by mouth every day. 90 Capsule 3    lisinopril (PRINIVIL) 40 MG tablet Take 1 Tablet by mouth every day. 90 Tablet 0    rosuvastatin (CRESTOR) 10 MG Tab Take 1 Tablet by mouth every evening. 90 Tablet 3    spironolactone (ALDACTONE) 25 MG Tab Take 0.5 Tablets by mouth every day. 45 Tablet 3    cyclobenzaprine (FLEXERIL) 10 mg Tab TAKE 1/2 TO 1 TABLET BY MOUTH AT BEDTIME AS NEEDED FOR PAIN OR SPASMS 90 Tablet 2    sertraline (ZOLOFT) 100 MG Tab TAKE 1 TABLET BY MOUTH DAILY 90 Tablet 3    LANTUS SOLOSTAR 100 UNIT/ML Solution Pen-injector injection ADMINISTER 25 UNITS UNDER THE SKIN EVERY EVENING 15 mL 1    B-D UF III MINI PEN NEEDLES 31G X 5 MM Misc USE TO INJECT LANTUS NIGHTLY 100 Each 1    metFORMIN ER (GLUCOPHAGE XR) 500 MG TABLET SR 24 HR Take 1 Tablet by mouth 2 times a day. 180 Tablet 3    budesonide-formoterol (SYMBICORT) 160-4.5 MCG/ACT Aerosol INHALE 2 PUFFS BY MOUTH TWICE DAILY 30.6 g 2    furosemide (LASIX) 20 MG Tab TAKE 1/2 TABLET BY MOUTH ONCE DAILY FOR 5 DAYS THEN TAKE AS NEEDED(FOR WEIGHT GAIN OF 3LB/DAY OR 5 LB/WEEK) 45 Tablet 3    NEEDLE, DISP, 27 G (BD DISP NEEDLES) 27G X 1/2\" Misc Use to inject lantus nightly. 100 Each 3    albuterol 108 (90 Base) MCG/ACT Aero Soln inhalation aerosol Inhale 1-2 Puffs by " mouth every 6 hours as needed for Shortness of Breath. 1 Inhaler 3    NON SPECIFIED BD fine needles for Lantus pens  Inject lantus nightly. 90 Each 3    Insulin Pen Needle 32 G x 4 mm Using one per day with Lantus injection 90 Each 3    clobetasol (TEMOVATE) 0.05 % Ointment Apply 1 Application to affected area(s) 2 times a day. 60 g 11    aspirin EC (ECOTRIN) 81 MG Tablet Delayed Response Take 81 mg by mouth every day.      Polyethyl Glycol-Propyl Glycol (SYSTANE FREE OP) by Ophthalmic route.      glucose blood (ONE TOUCH ULTRA TEST) strip USE TO TEST BLOOD SUGAR DAILY 100 Strip 11    LIFESCAN FINEPOINT LANCETS MISC USE TO TEST BLOOD SUGAR DAILY 100 Each 11    fluticasone (FLONASE) 50 MCG/ACT nasal spray USE ONE SPRAY IN EACH NOSTRIL DAILY 1 Bottle 5    NON SPECIFIED by Other route. 1. Lancettes. Use daily #100 RF x 1 year  2. Glucometer test strips. Use daily #100 RF x 1 year.  Dx: 250.00 100 Each 1    cetirizine (ZYRTEC) 10 MG TABS Take 10 mg by mouth every day.        vitamin D (CHOLECALCIFEROL) 1000 UNIT TABS Take 1,000 Units by mouth every day.      [DISCONTINUED] lisinopril (PRINIVIL) 40 MG tablet Take 1 Tablet by mouth every day. 90 Tablet 0    [DISCONTINUED] DILTIAZem CD (CARDIZEM CD) 300 MG CAPSULE SR 24 HR Take 1 Capsule by mouth every day. Please call [phone] to schedule a follow up for future refills. Thank you 90 Capsule 0    [DISCONTINUED] rosuvastatin (CRESTOR) 10 MG Tab TAKE 1 TABLET BY MOUTH EVERY DAY. MUST BE SEEN FOR FURTHER REFILLS 90 Tablet 0    [DISCONTINUED] spironolactone (ALDACTONE) 25 MG Tab Take 0.5 Tablets by mouth every day. To be started one week from  date. Complete 5 days of Furosemide 10mg first. 45 Tablet 3     No facility-administered encounter medications on file as of 8/22/2022.     Review of Systems   Constitutional:  Negative for fever, malaise/fatigue and weight loss.   Eyes:  Negative for blurred vision.   Respiratory:  Negative for cough and shortness of breath.   "  Cardiovascular:  Negative for chest pain, palpitations, orthopnea, claudication, leg swelling and PND.   Gastrointestinal:  Negative for abdominal pain, blood in stool, nausea and vomiting.   Genitourinary:  Negative for dysuria, frequency and hematuria.   Musculoskeletal:  Negative for falls and myalgias.   Neurological:  Negative for dizziness, tingling and loss of consciousness.   Endo/Heme/Allergies:  Does not bruise/bleed easily.      Objective:   BP (!) 96/48 (BP Location: Left arm, Patient Position: Sitting, BP Cuff Size: Adult)   Pulse 66   Resp 16   Ht 1.549 m (5' 1\")   Wt 52.2 kg (115 lb)   LMP 01/01/2000   SpO2 96%   BMI 21.73 kg/m²     Physical Exam  Vitals reviewed.   Constitutional:       Appearance: She is well-developed.   HENT:      Head: Normocephalic and atraumatic.   Eyes:      Pupils: Pupils are equal, round, and reactive to light.   Neck:      Vascular: No JVD.   Cardiovascular:      Rate and Rhythm: Normal rate and regular rhythm.      Heart sounds: Normal heart sounds. No murmur heard.    No friction rub. No gallop.   Pulmonary:      Effort: Pulmonary effort is normal. No respiratory distress.      Breath sounds: Normal breath sounds.   Abdominal:      General: Bowel sounds are normal. There is no distension.      Palpations: Abdomen is soft.   Skin:     General: Skin is warm and dry.      Findings: No erythema.   Neurological:      General: No focal deficit present.      Mental Status: She is alert and oriented to person, place, and time. Mental status is at baseline.   Psychiatric:         Mood and Affect: Mood normal.         Behavior: Behavior normal.     Lab Results   Component Value Date/Time    CHOLSTRLTOT 120 06/25/2021 02:11 PM    CHOLSTRLTOT 122 06/25/2021 02:11 PM    LDL 50 06/25/2021 02:11 PM    LDL 50 06/25/2021 02:11 PM    HDL 44 06/25/2021 02:11 PM    HDL 45 06/25/2021 02:11 PM    TRIGLYCERIDE 132 06/25/2021 02:11 PM    TRIGLYCERIDE 133 06/25/2021 02:11 PM       Lab " Results   Component Value Date/Time    SODIUM 140 04/15/2022 02:04 PM    POTASSIUM 4.9 04/15/2022 02:04 PM    CHLORIDE 109 04/15/2022 02:04 PM    CO2 19 (L) 04/15/2022 02:04 PM    GLUCOSE 89 04/15/2022 02:04 PM    BUN 17 04/15/2022 02:04 PM    CREATININE 1.50 (H) 04/15/2022 02:04 PM    CREATININE 1.07 (H) 12/27/2010 10:12 AM    BUNCREATRAT 14 12/27/2010 10:12 AM    GLOMRATE 52 (L) 12/27/2010 10:12 AM     Lab Results   Component Value Date/Time    ALKPHOSPHAT 81 04/15/2022 02:04 PM    ASTSGOT 21 04/15/2022 02:04 PM    ALTSGPT 20 04/15/2022 02:04 PM    TBILIRUBIN 0.2 04/15/2022 02:04 PM      Cardiac treadmill (8/25/2014): EXERCISE TREADMILL STRESS TEST:  1.  Baseline 12-lead ECG, normal sinus rhythm, rate 70, voltage criteria for   left ventricular hypertrophy.  2.  Completed 4 minutes 5 seconds of standard Jay protocol achieving 7.0   mets and a peak heart rate of 147 beats per minute, which is 96% of maximum   age predicted heart rate.  The patient had shortness of breath, but no chest   pressure.  There was lateral ischemic ST segment depression.  3.  Hypertensive response to exercise, the baseline blood pressure 169/89.    Peak blood pressure is 211/84.  Final blood pressure is 180/84.    Isolated PVCs in the recovery phase.     CONCLUSION:  Abnormal exercise treadmill stress test.    PFT (2/13/2020):   INTERPRETATION:  1.  Spirometry is consistent with a mild obstructive ventilatory defect.    There is no significant response to bronchodilators.  The measured MVV is   reduced proportionately to the degree of reduction in FEV1.    2.  There is no significant restrictive ventilatory defect.  The elevated   RV/TLC ratio is suggestive of air trapping.    3.  There is a mild diffusion impairment.  Reduced DLCO and normal DLCO/VA   ratio is consistent with obstructive airway disease and underestimation of the   alveolar volumes due to mild distribution of ventilation.    4.  Compared to prior testing in 2016, FEV1  has declined from 1.49 liters to   1.39 liters.    Echo (4/10/2019):   Prior echocardiogram 8/22/2014.  Normal left ventricular systolic function.   No evidence of valvular abnormality based on Doppler evaluation.   Ascending aorta diameter is 2.8 cm.  Compared to the images of the prior study done -  there has been no   significant change.     EKG (10/13/2017): Personally interpreted by me as sinus rhythm with LVH    Assessment:     1. HTN (hypertension), malignant  DILTIAZem CD (CARDIZEM CD) 300 MG CAPSULE SR 24 HR    lisinopril (PRINIVIL) 40 MG tablet    Basic Metabolic Panel      2. Family history of early CAD  rosuvastatin (CRESTOR) 10 MG Tab    Basic Metabolic Panel          Medical Decision Making:  Today's Assessment / Status / Plan:   Hypertension  -Continue diltiazem 300 mg daily  -Continue lisinopril 40 mg daily  -Continue spironolactone 12.5 mg daily.  Nonfasting BMP ordered.  -Today in office blood pressure is well controlled.    -Home blood pressure recordings are reported as 90/50's.  Encouraged to continue home BP monitoring/log.    Hyperlipidemia  -Continue baby aspirin  -Continue rosuvastatin 10 mg daily    T2DM; COPD  -PFT reviewed per above  -A1C 8.3%  -Per PCP    High risk medication use  -This includes diltiazem and lisinopril  -Will continue to closely monitor for side effects of patient's high risk medication(s) including  renal function and electrolytes  -Close monitoring discussed with patient.  Lab work ordered.    FU in clinic in 1 year with Dr. Flores. Sooner if needed.    Patient verbalizes understanding and agrees with the plan of care.     MOLLY Mcgowan.   [facility]  [phone]    PLEASE NOTE: This Note was created using voice recognition Software. I have made every reasonable attempt to correct obvious errors, but I expect that there are errors of grammar and possibly content that I did not discover before finalizing the  note

## 2022-10-17 ENCOUNTER — TELEPHONE (OUTPATIENT)
Dept: MEDICAL GROUP | Facility: LAB | Age: 77
End: 2022-10-17
Payer: MEDICARE

## 2022-10-20 ENCOUNTER — OFFICE VISIT (OUTPATIENT)
Dept: MEDICAL GROUP | Facility: LAB | Age: 77
End: 2022-10-20
Payer: MEDICARE

## 2022-10-20 VITALS
OXYGEN SATURATION: 97 % | HEIGHT: 61 IN | SYSTOLIC BLOOD PRESSURE: 100 MMHG | WEIGHT: 109 LBS | DIASTOLIC BLOOD PRESSURE: 50 MMHG | RESPIRATION RATE: 16 BRPM | HEART RATE: 74 BPM | TEMPERATURE: 97.3 F | BODY MASS INDEX: 20.58 KG/M2

## 2022-10-20 DIAGNOSIS — I10 HTN (HYPERTENSION), MALIGNANT: ICD-10-CM

## 2022-10-20 DIAGNOSIS — E78.5 HYPERLIPIDEMIA WITH TARGET LDL LESS THAN 100: ICD-10-CM

## 2022-10-20 DIAGNOSIS — K63.5 POLYP OF COLON, UNSPECIFIED PART OF COLON, UNSPECIFIED TYPE: ICD-10-CM

## 2022-10-20 DIAGNOSIS — Z23 NEED FOR HEPATITIS B VACCINATION: ICD-10-CM

## 2022-10-20 DIAGNOSIS — Z79.4 TYPE 2 DIABETES MELLITUS WITHOUT COMPLICATION, WITH LONG-TERM CURRENT USE OF INSULIN (HCC): ICD-10-CM

## 2022-10-20 DIAGNOSIS — I10 PRIMARY HYPERTENSION: ICD-10-CM

## 2022-10-20 DIAGNOSIS — E11.9 TYPE 2 DIABETES MELLITUS WITHOUT COMPLICATION, WITH LONG-TERM CURRENT USE OF INSULIN (HCC): ICD-10-CM

## 2022-10-20 DIAGNOSIS — E55.9 VITAMIN D INSUFFICIENCY: ICD-10-CM

## 2022-10-20 PROBLEM — R12 HEART BURN: Status: RESOLVED | Noted: 2020-11-24 | Resolved: 2022-10-20

## 2022-10-20 PROCEDURE — G0010 ADMIN HEPATITIS B VACCINE: HCPCS | Performed by: NURSE PRACTITIONER

## 2022-10-20 PROCEDURE — 99214 OFFICE O/P EST MOD 30 MIN: CPT | Mod: 25 | Performed by: NURSE PRACTITIONER

## 2022-10-20 PROCEDURE — 90746 HEPB VACCINE 3 DOSE ADULT IM: CPT | Performed by: NURSE PRACTITIONER

## 2022-10-20 RX ORDER — LISINOPRIL 40 MG/1
20 TABLET ORAL
Qty: 90 TABLET | Refills: 3
Start: 2022-10-20 | End: 2023-05-08 | Stop reason: SDUPTHER

## 2022-10-20 RX ORDER — LISINOPRIL 40 MG/1
40 TABLET ORAL
Qty: 90 TABLET | Refills: 3 | Status: SHIPPED | OUTPATIENT
Start: 2022-10-20 | End: 2022-10-20

## 2022-10-20 ASSESSMENT — FIBROSIS 4 INDEX: FIB4 SCORE: 1.73

## 2022-10-20 NOTE — PROGRESS NOTES
"CC  F/u     HPI:  Yamile is a 76 yo est female here to f/u on chronic issues:     #1- DM type II:  chronic issue.  Mentions that she had several teeth pulled a few months ago and had implants recently - lost about 10-15 lbs since spring. .  Checking BG only in the morning - range  lately.   Oral med: metformin 1000 mg daily and Injecting 25 units of lantus nightly and likes this tx regimen.  Denies foot numbness or tingling.  Denies difficulties with bowels or bladder.    #2- HTN:  chronic issue.  Checks BP periodically - readings have been great.   Taking diltiazem 300mg, 40 mg lisinopril and 12.5 mg spironolactone.  Denies chest pain or difficulty breathing.    Exam:  /50 (BP Location: Left arm, Patient Position: Sitting, BP Cuff Size: Adult)   Pulse 74   Temp 36.3 °C (97.3 °F)   Resp 16   Ht 1.549 m (5' 1\")   Wt 49.4 kg (109 lb)   SpO2 97%   Gen. appears healthy in no distress   Skin appropriate for sex and age   Neck trachea is midline  Lungs unlabored breathing  Heart regular rate  Neuro gait and station normal   Psych appropriate, calm, interactive, well-groomed      A/P:  \"  1. Primary hypertension        2. Hyperlipidemia with target LDL less than 100  Comp Metabolic Panel    CBC WITH DIFFERENTIAL    Lipid Profile    TSH      3. Type 2 diabetes mellitus without complication, with long-term current use of insulin (HCC)  HEMOGLOBIN A1C    MICROALBUMIN CREAT RATIO URINE      4. Vitamin D insufficiency  VITAMIN D,25 HYDROXY (DEFICIENCY)      5. Need for hepatitis B vaccination  Hepatitis B Vaccine Adult IM      6. Polyp of colon, unspecified part of colon, unspecified type  Referral to Gastroenterology      7. HTN (hypertension), malignant  lisinopril (PRINIVIL) 40 MG tablet    DISCONTINUED: lisinopril (PRINIVIL) 40 MG tablet      \"  Recommend a full updated lab panel as above.  Updated hepatitis B vaccines.  Referred for her colonoscopy.  Encouraged her to schedule her mammogram online.  " Encouraged her to obtain Shingrix at her pharmacy.  Discussed her weight which is a healthy weight for her height and I am concerned about hypoglycemia.  I encouraged her to decrease her Lantus by 2 units once weekly if fasting blood sugars in the morning are consistently below 100.  She will contact me if she is having episodes of hypoglycemia or if her fasting blood sugars in the morning are consistently greater than 150.  Recheck A1c here in the office in 6 months, sooner with any problems.    Also a bit hypotensive but fortunately asymptomatic.  Decrease lisinopril to 20 mg, continue spironolactone 12.5 mg and diltiazem 300 mg.  She will contact me via email if blood pressure readings at home are consistently below 100/50.  Health Maintenance Due   Topic Date Due    IMM ZOSTER VACCINES (2 of 3) 08/27/2010    IMM HEP B VACCINE (2 of 3 - 3-dose series) 01/27/2020    Annual Pulmonary Function Test / Spirometry  02/13/2021    COLORECTAL CANCER SCREENING  03/15/2021    MAMMOGRAM  04/05/2021    FASTING LIPID PROFILE  06/25/2022    URINE ACR / MICROALBUMIN  06/25/2022    RETINAL SCREENING  09/15/2022    DIABETES MONOFILAMENT / LE EXAM  09/30/2022    A1C SCREENING  10/21/2022

## 2022-10-20 NOTE — LETTER
[facility]  Audra Masters, A.P.N.  [address]  [place] [address]  Fax: [phone]   Authorization for Release/Disclosure of   Protected Health Information   Name: KENIA FIORE : 1945 SSN: [id number]   Address: [place] [address] Phone:    [phone] (home)    I authorize the entity listed below to release/disclose the PHI below to:   [facility]/Audra Masters, A.P.N. and Audra Masters, A.P.N.   Provider or Entity Name:  [facility]   Address   City, State, [facility]   Phone:      Fax:  [phone]   Reason for request: continuity of care   Information to be released:    [  ] LAST COLONOSCOPY,  including any PATH REPORT and follow-up  [  ] LAST FIT/COLOGUARD RESULT [  ] LAST DEXA  [  ] LAST MAMMOGRAM  [  ] LAST PAP  [  ] LAST LABS [XX  ] RETINA EXAM REPORT  [  ] IMMUNIZATION RECORDS  [  ] Release all info      [  ] Check here and initial the line next to each item to release ALL health information INCLUDING  _____ Care and treatment for drug and / or alcohol abuse  _____ HIV testing, infection status, or AIDS  _____ Genetic Testing    DATES OF SERVICE OR TIME PERIOD TO BE DISCLOSED: _____________  I understand and acknowledge that:  * This Authorization may be revoked at any time by you in writing, except if your health information has already been used or disclosed.  * Your health information that will be used or disclosed as a result of you signing this authorization could be re-disclosed by the recipient. If this occurs, your re-disclosed health information may no longer be protected by State or Federal laws.  * You may refuse to sign this Authorization. Your refusal will not affect your ability to obtain treatment.  * This Authorization becomes effective upon signing and will  on (date) __________.      If no date is indicated, this Authorization will  one (1) year from the signature date.    Name: Kenia Boucher  Anish    Signature:CONTINUIY OF CARE   Date:     10/20/2022       PLEASE FAX REQUESTED RECORDS BACK TO: [phone]

## 2023-01-19 DIAGNOSIS — E11.9 TYPE 2 DIABETES MELLITUS WITHOUT COMPLICATION, WITH LONG-TERM CURRENT USE OF INSULIN (HCC): ICD-10-CM

## 2023-01-19 DIAGNOSIS — Z79.4 TYPE 2 DIABETES MELLITUS WITHOUT COMPLICATION, WITH LONG-TERM CURRENT USE OF INSULIN (HCC): ICD-10-CM

## 2023-01-20 RX ORDER — INSULIN GLARGINE 100 [IU]/ML
INJECTION, SOLUTION SUBCUTANEOUS
Qty: 15 ML | Refills: 1 | Status: SHIPPED | OUTPATIENT
Start: 2023-01-20 | End: 2023-07-17 | Stop reason: SDUPTHER

## 2023-01-20 NOTE — TELEPHONE ENCOUNTER
Received request via: Pharmacy    Was the patient seen in the last year in this department? Yes  10/20/2022  Does the patient have an active prescription (recently filled or refills available) for medication(s) requested? No    Does the patient have FPC Plus and need 100 day supply (blood pressure, diabetes and cholesterol meds only)? Patient does not have SCP

## 2023-01-23 ENCOUNTER — TELEPHONE (OUTPATIENT)
Dept: MEDICAL GROUP | Facility: LAB | Age: 78
End: 2023-01-23
Payer: MEDICARE

## 2023-01-23 NOTE — TELEPHONE ENCOUNTER
DOCUMENTATION OF PAR STATUS:    1. Name of Medication & Dose:  cyclobenzaprine (FLEXERIL) 10 MG     2. Name of Prescription Coverage Company & phone #: [id number]      3. Date Prior Auth Submitted: 1/23/23    4. What information was given to obtain insurance decision? OV NOTES FAXED    5. Prior Auth Status? APPROVED    6. Patient Notified: yes

## 2023-02-22 RX ORDER — FLURBIPROFEN SODIUM 0.3 MG/ML
SOLUTION/ DROPS OPHTHALMIC
Qty: 100 EACH | Refills: 1 | Status: SHIPPED | OUTPATIENT
Start: 2023-02-22 | End: 2023-08-09

## 2023-03-17 RX ORDER — BUDESONIDE AND FORMOTEROL FUMARATE DIHYDRATE 160; 4.5 UG/1; UG/1
AEROSOL RESPIRATORY (INHALATION)
Qty: 30.6 G | Refills: 2 | Status: SHIPPED | OUTPATIENT
Start: 2023-03-17 | End: 2024-02-26

## 2023-03-17 NOTE — TELEPHONE ENCOUNTER
Received request via: Pharmacy    Was the patient seen in the last year in this department? Yes  10/20/2022  Does the patient have an active prescription (recently filled or refills available) for medication(s) requested? No    Does the patient have M[facility] Plus and need 100 day supply (blood pressure, diabetes and cholesterol meds only)? Patient does not have SCP

## 2023-03-23 ENCOUNTER — TELEPHONE (OUTPATIENT)
Dept: MEDICAL GROUP | Facility: LAB | Age: 78
End: 2023-03-23
Payer: MEDICARE

## 2023-04-13 ENCOUNTER — TELEPHONE (OUTPATIENT)
Dept: MEDICAL GROUP | Facility: LAB | Age: 78
End: 2023-04-13
Payer: MEDICARE

## 2023-05-06 DIAGNOSIS — I10 HTN (HYPERTENSION), MALIGNANT: ICD-10-CM

## 2023-05-08 RX ORDER — LISINOPRIL 40 MG/1
TABLET ORAL
Qty: 90 TABLET | Refills: 0 | Status: SHIPPED | OUTPATIENT
Start: 2023-05-08 | End: 2023-08-03 | Stop reason: SDUPTHER

## 2023-05-08 NOTE — TELEPHONE ENCOUNTER
Is the patient due for a refill? Yes    Was the patient seen the past year? Yes    Date of last office visit: 8/22/2022    Does the patient have an upcoming appointment?  No   If yes, When?     Provider to refill:YAYA    Does the patients insurance require a 100 day supply?  No

## 2023-05-23 DIAGNOSIS — Z79.4 TYPE 2 DIABETES MELLITUS WITHOUT COMPLICATION, WITH LONG-TERM CURRENT USE OF INSULIN (HCC): ICD-10-CM

## 2023-05-23 DIAGNOSIS — E11.9 TYPE 2 DIABETES MELLITUS WITHOUT COMPLICATION, WITH LONG-TERM CURRENT USE OF INSULIN (HCC): ICD-10-CM

## 2023-05-23 RX ORDER — METFORMIN HYDROCHLORIDE 500 MG/1
TABLET, EXTENDED RELEASE ORAL
Qty: 180 TABLET | Refills: 3 | Status: SHIPPED | OUTPATIENT
Start: 2023-05-23

## 2023-05-23 NOTE — TELEPHONE ENCOUNTER
Received request via: Pharmacy    Was the patient seen in the last year in this department? Yes  LOV 10/20/2022  Does the patient have an active prescription (recently filled or refills available) for medication(s) requested? No    Does the patient have halfway Plus and need 100 day supply (blood pressure, diabetes and cholesterol meds only)? Patient does not have SCP

## 2023-07-11 ENCOUNTER — HOSPITAL ENCOUNTER (OUTPATIENT)
Dept: LAB | Facility: MEDICAL CENTER | Age: 78
End: 2023-07-11
Attending: NURSE PRACTITIONER

## 2023-07-11 ENCOUNTER — HOSPITAL ENCOUNTER (OUTPATIENT)
Dept: LAB | Facility: MEDICAL CENTER | Age: 78
End: 2023-07-11
Attending: NURSE PRACTITIONER
Payer: MEDICARE

## 2023-07-11 DIAGNOSIS — E11.9 TYPE 2 DIABETES MELLITUS WITHOUT COMPLICATION, WITH LONG-TERM CURRENT USE OF INSULIN (HCC): ICD-10-CM

## 2023-07-11 DIAGNOSIS — E55.9 VITAMIN D INSUFFICIENCY: ICD-10-CM

## 2023-07-11 DIAGNOSIS — Z79.4 TYPE 2 DIABETES MELLITUS WITHOUT COMPLICATION, WITH LONG-TERM CURRENT USE OF INSULIN (HCC): ICD-10-CM

## 2023-07-11 DIAGNOSIS — E78.5 HYPERLIPIDEMIA WITH TARGET LDL LESS THAN 100: ICD-10-CM

## 2023-07-11 LAB
25(OH)D3 SERPL-MCNC: 42 NG/ML (ref 30–100)
BASOPHILS # BLD AUTO: 0.8 % (ref 0–1.8)
BASOPHILS # BLD: 0.07 K/UL (ref 0–0.12)
EOSINOPHIL # BLD AUTO: 0.36 K/UL (ref 0–0.51)
EOSINOPHIL NFR BLD: 4 % (ref 0–6.9)
ERYTHROCYTE [DISTWIDTH] IN BLOOD BY AUTOMATED COUNT: 46 FL (ref 35.9–50)
EST. AVERAGE GLUCOSE BLD GHB EST-MCNC: 157 MG/DL
HBA1C MFR BLD: 7.1 % (ref 4–5.6)
HCT VFR BLD AUTO: 38.8 % (ref 37–47)
HGB BLD-MCNC: 12.8 G/DL (ref 12–16)
IMM GRANULOCYTES # BLD AUTO: 0.05 K/UL (ref 0–0.11)
IMM GRANULOCYTES NFR BLD AUTO: 0.6 % (ref 0–0.9)
LYMPHOCYTES # BLD AUTO: 1.37 K/UL (ref 1–4.8)
LYMPHOCYTES NFR BLD: 15.4 % (ref 22–41)
MCH RBC QN AUTO: 28.8 PG (ref 27–33)
MCHC RBC AUTO-ENTMCNC: 33 G/DL (ref 32.2–35.5)
MCV RBC AUTO: 87.2 FL (ref 81.4–97.8)
MONOCYTES # BLD AUTO: 0.86 K/UL (ref 0–0.85)
MONOCYTES NFR BLD AUTO: 9.7 % (ref 0–13.4)
NEUTROPHILS # BLD AUTO: 6.18 K/UL (ref 1.82–7.42)
NEUTROPHILS NFR BLD: 69.5 % (ref 44–72)
NRBC # BLD AUTO: 0 K/UL
NRBC BLD-RTO: 0 /100 WBC (ref 0–0.2)
PLATELET # BLD AUTO: 224 K/UL (ref 164–446)
PMV BLD AUTO: 11.9 FL (ref 9–12.9)
RBC # BLD AUTO: 4.45 M/UL (ref 4.2–5.4)
TSH SERPL DL<=0.005 MIU/L-ACNC: 4.06 UIU/ML (ref 0.38–5.33)
WBC # BLD AUTO: 8.9 K/UL (ref 4.8–10.8)

## 2023-07-11 PROCEDURE — 80061 LIPID PANEL: CPT

## 2023-07-11 PROCEDURE — 82306 VITAMIN D 25 HYDROXY: CPT

## 2023-07-11 PROCEDURE — 36415 COLL VENOUS BLD VENIPUNCTURE: CPT

## 2023-07-11 PROCEDURE — 82570 ASSAY OF URINE CREATININE: CPT

## 2023-07-11 PROCEDURE — 85025 COMPLETE CBC W/AUTO DIFF WBC: CPT

## 2023-07-11 PROCEDURE — 80053 COMPREHEN METABOLIC PANEL: CPT

## 2023-07-11 PROCEDURE — 82043 UR ALBUMIN QUANTITATIVE: CPT

## 2023-07-11 PROCEDURE — 83036 HEMOGLOBIN GLYCOSYLATED A1C: CPT

## 2023-07-11 PROCEDURE — 84443 ASSAY THYROID STIM HORMONE: CPT

## 2023-07-12 LAB
ALBUMIN SERPL BCP-MCNC: 4.9 G/DL (ref 3.2–4.9)
ALBUMIN/GLOB SERPL: 1.9 G/DL
ALP SERPL-CCNC: 78 U/L (ref 30–99)
ALT SERPL-CCNC: 15 U/L (ref 2–50)
ANION GAP SERPL CALC-SCNC: 14 MMOL/L (ref 7–16)
AST SERPL-CCNC: 15 U/L (ref 12–45)
BILIRUB SERPL-MCNC: 0.3 MG/DL (ref 0.1–1.5)
BUN SERPL-MCNC: 34 MG/DL (ref 8–22)
CALCIUM ALBUM COR SERPL-MCNC: 9.3 MG/DL (ref 8.5–10.5)
CALCIUM SERPL-MCNC: 10 MG/DL (ref 8.5–10.5)
CHLORIDE SERPL-SCNC: 109 MMOL/L (ref 96–112)
CHOLEST SERPL-MCNC: 135 MG/DL (ref 100–199)
CO2 SERPL-SCNC: 19 MMOL/L (ref 20–33)
CREAT SERPL-MCNC: 1.63 MG/DL (ref 0.5–1.4)
CREAT UR-MCNC: 63.69 MG/DL
FASTING STATUS PATIENT QL REPORTED: NORMAL
GFR SERPLBLD CREATININE-BSD FMLA CKD-EPI: 32 ML/MIN/1.73 M 2
GLOBULIN SER CALC-MCNC: 2.6 G/DL (ref 1.9–3.5)
GLUCOSE SERPL-MCNC: 82 MG/DL (ref 65–99)
HDLC SERPL-MCNC: 63 MG/DL
LDLC SERPL CALC-MCNC: 57 MG/DL
MICROALBUMIN UR-MCNC: <1.2 MG/DL
MICROALBUMIN/CREAT UR: NORMAL MG/G (ref 0–30)
POTASSIUM SERPL-SCNC: 5.8 MMOL/L (ref 3.6–5.5)
PROT SERPL-MCNC: 7.5 G/DL (ref 6–8.2)
SODIUM SERPL-SCNC: 142 MMOL/L (ref 135–145)
TRIGL SERPL-MCNC: 75 MG/DL (ref 0–149)

## 2023-07-12 NOTE — RESULT ENCOUNTER NOTE
Patient has elevated potassium on electrolyte panel.  Please have her hold spironolactone and recheck nonfasting BMP 24-48 hours before next appointment with myself on 7/20/2023.  Please have patient discuss all other blood test results with Sonam TIWARI, her PCP

## 2023-07-14 DIAGNOSIS — I10 PRIMARY HYPERTENSION: ICD-10-CM

## 2023-07-14 DIAGNOSIS — I51.7 LEFT VENTRICULAR HYPERTROPHY: ICD-10-CM

## 2023-07-14 DIAGNOSIS — Z79.899 HIGH RISK MEDICATION USE: ICD-10-CM

## 2023-07-14 DIAGNOSIS — I10 HTN (HYPERTENSION), MALIGNANT: ICD-10-CM

## 2023-07-17 ENCOUNTER — OFFICE VISIT (OUTPATIENT)
Dept: MEDICAL GROUP | Facility: LAB | Age: 78
End: 2023-07-17
Payer: MEDICARE

## 2023-07-17 ENCOUNTER — TELEPHONE (OUTPATIENT)
Dept: MEDICAL GROUP | Facility: LAB | Age: 78
End: 2023-07-17

## 2023-07-17 VITALS
TEMPERATURE: 97.1 F | WEIGHT: 111 LBS | HEART RATE: 76 BPM | BODY MASS INDEX: 20.96 KG/M2 | HEIGHT: 61 IN | SYSTOLIC BLOOD PRESSURE: 100 MMHG | RESPIRATION RATE: 12 BRPM | OXYGEN SATURATION: 95 % | DIASTOLIC BLOOD PRESSURE: 48 MMHG

## 2023-07-17 DIAGNOSIS — E87.5 HYPERKALEMIA: ICD-10-CM

## 2023-07-17 DIAGNOSIS — R06.02 SOB (SHORTNESS OF BREATH): ICD-10-CM

## 2023-07-17 DIAGNOSIS — Z23 NEED FOR HEPATITIS B VACCINATION: ICD-10-CM

## 2023-07-17 DIAGNOSIS — E11.9 TYPE 2 DIABETES MELLITUS WITHOUT COMPLICATION, WITH LONG-TERM CURRENT USE OF INSULIN (HCC): ICD-10-CM

## 2023-07-17 DIAGNOSIS — Z79.4 TYPE 2 DIABETES MELLITUS WITHOUT COMPLICATION, WITH LONG-TERM CURRENT USE OF INSULIN (HCC): ICD-10-CM

## 2023-07-17 DIAGNOSIS — N18.32 STAGE 3B CHRONIC KIDNEY DISEASE: ICD-10-CM

## 2023-07-17 PROCEDURE — 3078F DIAST BP <80 MM HG: CPT | Performed by: NURSE PRACTITIONER

## 2023-07-17 PROCEDURE — 3074F SYST BP LT 130 MM HG: CPT | Performed by: NURSE PRACTITIONER

## 2023-07-17 PROCEDURE — G0010 ADMIN HEPATITIS B VACCINE: HCPCS | Performed by: NURSE PRACTITIONER

## 2023-07-17 PROCEDURE — 99214 OFFICE O/P EST MOD 30 MIN: CPT | Mod: 25 | Performed by: NURSE PRACTITIONER

## 2023-07-17 PROCEDURE — 90746 HEPB VACCINE 3 DOSE ADULT IM: CPT | Performed by: NURSE PRACTITIONER

## 2023-07-17 RX ORDER — ALBUTEROL SULFATE 90 UG/1
1-2 AEROSOL, METERED RESPIRATORY (INHALATION) EVERY 6 HOURS PRN
Qty: 1 EACH | Refills: 3 | Status: SHIPPED | OUTPATIENT
Start: 2023-07-17

## 2023-07-17 RX ORDER — INSULIN GLARGINE 100 [IU]/ML
15 INJECTION, SOLUTION SUBCUTANEOUS EVERY EVENING
Qty: 15 ML | Refills: 1
Start: 2023-07-17 | End: 2023-10-30

## 2023-07-17 RX ORDER — CLOBETASOL PROPIONATE 0.5 MG/G
1 OINTMENT TOPICAL 2 TIMES DAILY
Qty: 60 G | Refills: 11 | Status: SHIPPED | OUTPATIENT
Start: 2023-07-17

## 2023-07-17 ASSESSMENT — FIBROSIS 4 INDEX: FIB4 SCORE: 1.31

## 2023-07-17 NOTE — PROGRESS NOTES
"CC  Lab follow-up      HPI:  Yamile is a 78-year-old established female here to follow-up on type 2 diabetes.  A1c returned July 11 at 7.1, was previously 7.8 and has been as high as 8.3 in the past 2 years.  Currently injecting 15 units of Lantus nightly and taking metformin orally 500 mg XR once daily.  She occasionally has episodes that she calls the sweats but does not check her blood sugars when this is occurring and they are resolved with juice or a Glucerna.  Does not check blood sugars daily.  Microalbumin negative.  She does have chronic kidney disease-recent BUN/creatinine 34/1.63 with a GFR of 32 ; 1 year ago was 17/1.5 and GFR was 36.  Drinks 2-3 glasses water per day, one glass of cranberry  or tomato juice, one coke per day.  Limits salt.  Rarely takes aleve / advil.    Blood sugar 82 this morning but otherwise hasn't checked in a few weeks prior to that.    Thinks that she saw her eye doc in the past year.      Hyperkalemia: Cardiology notified her via [employer] to hold her spironolactone and recheck her BMP prior to this appointment but she did not see this message and has continued on spironolactone.  Denies heart palpitations or muscle weakness.      ear allergic dermatitis: Chronic.  Alleviated by clobetasol ointment and she is requesting a refill.    Intermittent shortness of breath: Requesting refill albuterol.  Typically 1 inhaler will last her for up to a year.  Quit smoking 30 years ago.    Exam:  /48 (BP Location: Right arm, Patient Position: Sitting, BP Cuff Size: Adult)   Pulse 76   Temp 36.2 °C (97.1 °F)   Resp 12   Ht 1.549 m (5' 1\")   Wt 50.3 kg (111 lb)   SpO2 95%   Monofilament testing with a 10 gram force: sensation intact: intact bilaterally  Pedal pulses: intact bilaterally.  Visual inspection: Feet are covered in callus type structures chronically.  Gen. appears healthy in no distress   Skin appropriate for sex and age   Neck trachea is midline  Lungs unlabored " breathing  Heart regular rate and rhythm  Neuro gait and station normal   Psych appropriate, calm, interactive, well-groomed    A/P:  1. Type 2 diabetes mellitus without complication, with long-term current use of insulin ([facility])  -A1c improved.  Continue 15 units of Lantus prior to bedtime and metformin.  Discussed treatment of hypoglycemia.  Encouraged her to check her blood sugars when she has an episode of feeling sweaty.  Also encouraged her to check her blood sugars fasting every morning and at night prior to bedtime.  Recheck A1c 6 months.  Discussed proper foot care.  Encouraged her to see her eye doctor yearly.  We did reach out to her eye doctor to obtain her retinal exam.  - insulin glargine (LANTUS SOLOSTAR) 100 UNIT/ML Solution Pen-injector injection; Inject 15 Units under the skin every evening.  Dispense: 15 mL; Refill: 1  - Basic Metabolic Panel; Future  - HEMOGLOBIN A1C; Future    2. Stage 3b chronic kidney disease (HCC)  -Discussed chronic kidney disease.  Encouraged her to avoid NSAIDs, high salt foods and I encouraged her to stay well-hydrated with about 64 ounces of fluids per day.  Recheck 6 months.  - Basic Metabolic Panel; Future  - HEMOGLOBIN A1C; Future    3. SOB (shortness of breath) - quit in her 40's.   -Stable with as needed albuterol.  - albuterol 108 (90 Base) MCG/ACT Aero Soln inhalation aerosol; Inhale 1-2 Puffs every 6 hours as needed for Shortness of Breath.  Dispense: 1 Each; Refill: 3    4. Need for hepatitis B vaccination  - Hepatitis B Vaccine Adult IM     5.  Ear dermatitis: Refill clobetasol.    6.  Hyperkalemia: Reviewed message from cardiology.  Reiterated the importance of stopping spironolactone.  She will keep her cardiology appointment for the end of this week but will refrain from checking her potassium until next week as she will stop the spironolactone tomorrow.

## 2023-07-17 NOTE — TELEPHONE ENCOUNTER
Patient states she completed her retinal exam sometime this year but cannot remember the exact date. This was completed at [facility].

## 2023-07-18 ENCOUNTER — TELEPHONE (OUTPATIENT)
Dept: MEDICAL GROUP | Facility: LAB | Age: 78
End: 2023-07-18
Payer: MEDICARE

## 2023-07-18 NOTE — TELEPHONE ENCOUNTER
Faxed records rqst to Dr. Oliveros's office     7/20/23:I left msg to cb to let me know when last eye exam was done. I faxed records rqst, but I don't know if she has been seen since our last records received.   7/27/23: I left msg to see if she has been seen for eye exam within the last year. I mentioned info above and to please call back to let us know

## 2023-07-20 DIAGNOSIS — M51.36 DDD (DEGENERATIVE DISC DISEASE), LUMBAR: ICD-10-CM

## 2023-07-20 RX ORDER — CYCLOBENZAPRINE HCL 10 MG
TABLET ORAL
Qty: 90 TABLET | Refills: 2 | Status: SHIPPED | OUTPATIENT
Start: 2023-07-20

## 2023-07-20 RX ORDER — SERTRALINE HYDROCHLORIDE 100 MG/1
TABLET, FILM COATED ORAL
Qty: 90 TABLET | Refills: 3 | Status: SHIPPED | OUTPATIENT
Start: 2023-07-20

## 2023-07-20 NOTE — TELEPHONE ENCOUNTER
Received request via: Pharmacy    Was the patient seen in the last year in this department? Yes  LOV 07/17/2023  Does the patient have an active prescription (recently filled or refills available) for medication(s) requested? No    Does the patient have nursing home Plus and need 100 day supply (blood pressure, diabetes and cholesterol meds only)? Medication is not for cholesterol, blood pressure or diabetes and Patient does not have SCP

## 2023-07-31 ENCOUNTER — HOSPITAL ENCOUNTER (OUTPATIENT)
Dept: LAB | Facility: MEDICAL CENTER | Age: 78
End: 2023-07-31
Attending: NURSE PRACTITIONER
Payer: MEDICARE

## 2023-07-31 DIAGNOSIS — Z79.899 HIGH RISK MEDICATION USE: ICD-10-CM

## 2023-07-31 DIAGNOSIS — I51.7 LEFT VENTRICULAR HYPERTROPHY: ICD-10-CM

## 2023-07-31 DIAGNOSIS — I10 HTN (HYPERTENSION), MALIGNANT: ICD-10-CM

## 2023-07-31 DIAGNOSIS — I10 PRIMARY HYPERTENSION: ICD-10-CM

## 2023-07-31 LAB
ANION GAP SERPL CALC-SCNC: 11 MMOL/L (ref 7–16)
BUN SERPL-MCNC: 24 MG/DL (ref 8–22)
CALCIUM SERPL-MCNC: 9.9 MG/DL (ref 8.5–10.5)
CHLORIDE SERPL-SCNC: 108 MMOL/L (ref 96–112)
CO2 SERPL-SCNC: 21 MMOL/L (ref 20–33)
CREAT SERPL-MCNC: 1.55 MG/DL (ref 0.5–1.4)
GFR SERPLBLD CREATININE-BSD FMLA CKD-EPI: 34 ML/MIN/1.73 M 2
GLUCOSE SERPL-MCNC: 95 MG/DL (ref 65–99)
POTASSIUM SERPL-SCNC: 5.1 MMOL/L (ref 3.6–5.5)
SODIUM SERPL-SCNC: 140 MMOL/L (ref 135–145)

## 2023-07-31 PROCEDURE — 80048 BASIC METABOLIC PNL TOTAL CA: CPT

## 2023-07-31 PROCEDURE — 36415 COLL VENOUS BLD VENIPUNCTURE: CPT

## 2023-08-03 ENCOUNTER — TELEMEDICINE (OUTPATIENT)
Dept: CARDIOLOGY | Facility: MEDICAL CENTER | Age: 78
End: 2023-08-03
Attending: NURSE PRACTITIONER
Payer: MEDICARE

## 2023-08-03 VITALS
HEIGHT: 61 IN | OXYGEN SATURATION: 92 % | DIASTOLIC BLOOD PRESSURE: 58 MMHG | BODY MASS INDEX: 20.96 KG/M2 | HEART RATE: 56 BPM | WEIGHT: 111 LBS | SYSTOLIC BLOOD PRESSURE: 124 MMHG

## 2023-08-03 DIAGNOSIS — I51.7 LEFT VENTRICULAR HYPERTROPHY: ICD-10-CM

## 2023-08-03 DIAGNOSIS — Z82.49 FAMILY HISTORY OF EARLY CAD: ICD-10-CM

## 2023-08-03 DIAGNOSIS — Z79.899 HIGH RISK MEDICATION USE: ICD-10-CM

## 2023-08-03 DIAGNOSIS — J44.9 CHRONIC OBSTRUCTIVE PULMONARY DISEASE, UNSPECIFIED COPD TYPE (HCC): ICD-10-CM

## 2023-08-03 DIAGNOSIS — I10 HTN (HYPERTENSION), MALIGNANT: ICD-10-CM

## 2023-08-03 PROCEDURE — 99214 OFFICE O/P EST MOD 30 MIN: CPT | Mod: 95 | Performed by: NURSE PRACTITIONER

## 2023-08-03 RX ORDER — LISINOPRIL 40 MG/1
40 TABLET ORAL
Qty: 90 TABLET | Refills: 3 | Status: SHIPPED | OUTPATIENT
Start: 2023-08-03

## 2023-08-03 RX ORDER — ROSUVASTATIN CALCIUM 10 MG/1
10 TABLET, COATED ORAL EVERY EVENING
Qty: 90 TABLET | Refills: 3 | Status: SHIPPED | OUTPATIENT
Start: 2023-08-03

## 2023-08-03 RX ORDER — DILTIAZEM HYDROCHLORIDE 300 MG/1
300 CAPSULE, COATED, EXTENDED RELEASE ORAL
Qty: 90 CAPSULE | Refills: 3 | Status: SHIPPED | OUTPATIENT
Start: 2023-08-03

## 2023-08-03 ASSESSMENT — ENCOUNTER SYMPTOMS
BLURRED VISION: 0
COUGH: 0
SHORTNESS OF BREATH: 0
LOSS OF CONSCIOUSNESS: 0
FALLS: 0
PND: 0
WEIGHT LOSS: 0
FEVER: 0
TINGLING: 0
BRUISES/BLEEDS EASILY: 0
PALPITATIONS: 0
ABDOMINAL PAIN: 0
BLOOD IN STOOL: 0
NAUSEA: 0
MYALGIAS: 0
CLAUDICATION: 0
VOMITING: 0
DIZZINESS: 0
ORTHOPNEA: 0

## 2023-08-03 ASSESSMENT — FIBROSIS 4 INDEX: FIB4 SCORE: 1.31

## 2023-08-03 NOTE — PROGRESS NOTES
Chief Complaint   Patient presents with    Hypertension     F/V Dx: HTN (hypertension), malignant     This evaluation was conducted via Zoom using secure and encrypted videoconferencing technology. The patient was in their home in the [facility].    The patient's identity was confirmed and verbal consent was obtained for this virtual visit.    Subjective:   Yamile Oconnell is a 76 y.o. female who presents today hypertension, hyperlipidemia follow-up.  Patient was last seen by myself on 8/22/2022 and Dr. Flores on 12/30/2020.    Patient has past medical history of COPD, gout, T2DM    Today, patient reports she feels overall improved since holding her spironolactone.  Her blood pressure remains stable.  Patient denies increased extremity edema or shortness of breath.  In addition patient also denies palpitations, dizziness/lightheadedness, with apnea, PND, chest pain.    We will continue her medical therapy as previously prescribed.  reviewed her lab work per below.  We will order lipids, renal, electrolyte, hepatic function evaluation prior to follow-up next year.  Patient will be cardiology 1 year.    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    Cataract     bilat    Cervical High Risk HPV Test Positive 10/18/2006    COPD (chronic obstructive pulmonary disease) (HCC) 6/11/2009    DDD (degenerative disc disease), lumbar 8/14/2013    Depression 6/11/2009    Emphysema of lung (HCC)     Glaucoma     Glaucoma suspect 6/11/2009    HDL deficiency 3/30/2012    Heart burn     History of Gout when on HCTZ 6/11/2009    Hyperlipidemia LDL goal < 100 6/11/2009    Hyperplastic colon polyp 12/7/2007    Hypertension     Hypertriglyceridemia 3/30/2012    Personal history of allergy to eggs 4/2/2010    Posterior vitreous detachment of right eye  "2015    Seizure (HCC)     \"sun stroke\"    Type II or unspecified type diabetes mellitus without mention of complication, not stated as uncontrolled     oral meds and insulin    Vitamin d deficiency 2010     Past Surgical History:   Procedure Laterality Date    CATARACT PHACO WITH IOL Right 2017    Procedure: CATARACT PHACO WITH IOL;  Surgeon: Carlos Arce M.D.;  Location: [facility]ERY SAME [facility];  Service: Ophthalmology    CATARACT PHACO WITH IOL Left 10/17/2017    Procedure: CATARACT PHACO WITH IOL;  Surgeon: Carlos Arce M.D.;  Location: SURGERY SAME [facility];  Service: Ophthalmology    EYE SURGERY Bilateral     Dr. Garrido    SINUSOTOMIES      EYE SURGERY      \"laser for eye pressure\"    DILATION AND CURETTAGE      TONSILLECTOMY       Family History   Problem Relation Age of Onset    Heart Disease Father         d. MI 50s    Hypertension Father     Heart Attack Father     Lung Disease Mother         d. lung dz    Hypertension Mother     Cancer Paternal Grandfather         Black Lung    Diabetes Paternal Grandmother     Heart Disease Sister 59        mi and CVA -     Stroke Sister     Heart Attack Brother     Diabetes Other         Paternal Cousin DM1     Social History     Socioeconomic History    Marital status:      Spouse name: Not on file    Number of children: 1    Years of education: Not on file    Highest education level: Not on file   Occupational History    Not on file   Tobacco Use    Smoking status: Former     Packs/day: 1.00     Years: 29.00     Pack years: 29.00     Types: Cigarettes     Start date: 1966     Quit date: 1995     Years since quittin.6    Smokeless tobacco: Never   Substance and Sexual Activity    Alcohol use: Not Currently    Drug use: Yes     Types: Marijuana    Sexual activity: Yes     Partners: Male   Other Topics Concern    Not on file   Social History Narrative    . . Has sig other for many " "years.      Social Determinants of Health     Financial Resource Strain: Not on file   Food Insecurity: Not on file   Transportation Needs: Not on file   Physical Activity: Not on file   Stress: Not on file   Social Connections: Not on file   Intimate Partner Violence: Not on file   Housing Stability: Not on file     Allergies   Allergen Reactions    Amlodipine Swelling     LE edema    Pcn [Penicillins] Rash     .    Sulfa Drugs Rash     .    Toprol Xl [Metoprolol]      Fatigue       Outpatient Encounter Medications as of 8/3/2023   Medication Sig Dispense Refill    lisinopril (PRINIVIL) 40 MG tablet Take 1 Tablet by mouth every day. 90 Tablet 3    dilTIAZem CD (CARDIZEM CD) 300 MG CAPSULE SR 24 HR Take 1 Capsule by mouth every day. 90 Capsule 3    rosuvastatin (CRESTOR) 10 MG Tab Take 1 Tablet by mouth every evening. 90 Tablet 3    cyclobenzaprine (FLEXERIL) 10 mg Tab TAKE 1/2 TO 1 TABLET BY MOUTH AT BEDTIME AS NEEDED FOR PAIN OR SPASMS 90 Tablet 2    sertraline (ZOLOFT) 100 MG Tab TAKE 1 TABLET BY MOUTH DAILY 90 Tablet 3    albuterol 108 (90 Base) MCG/ACT Aero Soln inhalation aerosol Inhale 1-2 Puffs every 6 hours as needed for Shortness of Breath. 1 Each 3    clobetasol (TEMOVATE) 0.05 % Ointment Apply 1 Application  topically 2 times a day. 60 g 11    insulin glargine (LANTUS SOLOSTAR) 100 UNIT/ML Solution Pen-injector injection Inject 15 Units under the skin every evening. 15 mL 1    metFORMIN ER (GLUCOPHAGE XR) 500 MG TABLET SR 24 HR TAKE 1 TABLET BY MOUTH TWICE DAILY 180 Tablet 3    SYMBICORT 160-4.5 MCG/ACT Aerosol INHALE 2 PUFFS BY MOUTH TWICE DAILY 30.6 g 2    B-D UF III MINI PEN NEEDLES 31G X 5 MM Misc USE TO INJECT LANTUS NIGHTLY 100 Each 1    ammonium lactate (LAC-HYDRIN) 12 % Lotion APPLY TOPICALLY TO THE AFFECTED AREA TWICE DAILY AS NEEDED FOR DRY SKIN      NEEDLE, DISP, 27 G (BD DISP NEEDLES) 27G X 1/2\" Misc Use to inject lantus nightly. 100 Each 3    NON SPECIFIED BD fine needles for Lantus " pens  Inject lantus nightly. 90 Each 3    Insulin Pen Needle 32 G x 4 mm Using one per day with Lantus injection 90 Each 3    aspirin EC (ECOTRIN) 81 MG Tablet Delayed Response Take 81 mg by mouth every day.      Polyethyl Glycol-Propyl Glycol (SYSTANE FREE OP) by Ophthalmic route.      glucose blood (ONE TOUCH ULTRA TEST) strip USE TO TEST BLOOD SUGAR DAILY 100 Strip 11    LIFESCAN FINEPOINT LANCETS MISC USE TO TEST BLOOD SUGAR DAILY 100 Each 11    fluticasone (FLONASE) 50 MCG/ACT nasal spray USE ONE SPRAY IN EACH NOSTRIL DAILY 1 Bottle 5    NON SPECIFIED by Other route. 1. Lancettes. Use daily #100 RF x 1 year  2. Glucometer test strips. Use daily #100 RF x 1 year.  Dx: 250.00 100 Each 1    cetirizine (ZYRTEC) 10 MG TABS Take 10 mg by mouth every day.        vitamin D (CHOLECALCIFEROL) 1000 UNIT TABS Take 1,000 Units by mouth every day.      [DISCONTINUED] lisinopril (PRINIVIL) 40 MG tablet TAKE 1 TABLET BY MOUTH EVERY DAY 90 Tablet 0    [DISCONTINUED] DILTIAZem CD (CARDIZEM CD) 300 MG CAPSULE SR 24 HR Take 1 Capsule by mouth every day. 90 Capsule 3    [DISCONTINUED] rosuvastatin (CRESTOR) 10 MG Tab Take 1 Tablet by mouth every evening. 90 Tablet 3     No facility-administered encounter medications on file as of 8/3/2023.     Review of Systems   Constitutional:  Negative for fever, malaise/fatigue and weight loss.   Eyes:  Negative for blurred vision.   Respiratory:  Negative for cough and shortness of breath.    Cardiovascular:  Negative for chest pain, palpitations, orthopnea, claudication, leg swelling and PND.   Gastrointestinal:  Negative for abdominal pain, blood in stool, nausea and vomiting.   Genitourinary:  Negative for dysuria, frequency and hematuria.   Musculoskeletal:  Negative for falls and myalgias.   Neurological:  Negative for dizziness, tingling and loss of consciousness.   Endo/Heme/Allergies:  Does not bruise/bleed easily.        Objective:   /58 (BP Location: Left arm, Patient Position:  "Sitting, BP Cuff Size: Adult)   Pulse (!) 56   Ht 1.549 m (5' 1\")   Wt 50.3 kg (111 lb)   LMP 01/01/2000   SpO2 92%   Breastfeeding No   BMI 20.97 kg/m²     Physical Exam  Vitals reviewed.   Constitutional:       Appearance: She is well-developed.   HENT:      Head: Normocephalic and atraumatic.   Eyes:      Pupils: Pupils are equal, round, and reactive to light.   Neck:      Vascular: No JVD.   Cardiovascular:      Rate and Rhythm: Normal rate and regular rhythm.      Heart sounds: Normal heart sounds. No murmur heard.     No friction rub. No gallop.   Pulmonary:      Effort: Pulmonary effort is normal. No respiratory distress.      Breath sounds: Normal breath sounds.   Abdominal:      General: Bowel sounds are normal. There is no distension.      Palpations: Abdomen is soft.   Skin:     General: Skin is warm and dry.      Findings: No erythema.   Neurological:      General: No focal deficit present.      Mental Status: She is alert and oriented to person, place, and time. Mental status is at baseline.   Psychiatric:         Mood and Affect: Mood normal.         Behavior: Behavior normal.       Lab Results   Component Value Date/Time    CHOLSTRLTOT 135 07/11/2023 01:44 PM    LDL 57 07/11/2023 01:44 PM    HDL 63 07/11/2023 01:44 PM    TRIGLYCERIDE 75 07/11/2023 01:44 PM       Lab Results   Component Value Date/Time    SODIUM 140 07/31/2023 12:53 PM    POTASSIUM 5.1 07/31/2023 12:53 PM    CHLORIDE 108 07/31/2023 12:53 PM    CO2 21 07/31/2023 12:53 PM    GLUCOSE 95 07/31/2023 12:53 PM    BUN 24 (H) 07/31/2023 12:53 PM    CREATININE 1.55 (H) 07/31/2023 12:53 PM    CREATININE 1.07 (H) 12/27/2010 10:12 AM    BUNCREATRAT 14 12/27/2010 10:12 AM    GLOMRATE 52 (L) 12/27/2010 10:12 AM     Lab Results   Component Value Date/Time    ALKPHOSPHAT 78 07/11/2023 01:44 PM    ASTSGOT 15 07/11/2023 01:44 PM    ALTSGPT 15 07/11/2023 01:44 PM    TBILIRUBIN 0.3 07/11/2023 01:44 PM      Cardiac treadmill (8/25/2014): EXERCISE " TREADMILL STRESS TEST:  1.  Baseline 12-lead ECG, normal sinus rhythm, rate 70, voltage criteria for   left ventricular hypertrophy.  2.  Completed 4 minutes 5 seconds of standard Jay protocol achieving 7.0   mets and a peak heart rate of 147 beats per minute, which is 96% of maximum   age predicted heart rate.  The patient had shortness of breath, but no chest   pressure.  There was lateral ischemic ST segment depression.  3.  Hypertensive response to exercise, the baseline blood pressure 169/89.    Peak blood pressure is 211/84.  Final blood pressure is 180/84.    Isolated PVCs in the recovery phase.     CONCLUSION:  Abnormal exercise treadmill stress test.    PFT (2/13/2020):   INTERPRETATION:  1.  Spirometry is consistent with a mild obstructive ventilatory defect.    There is no significant response to bronchodilators.  The measured MVV is   reduced proportionately to the degree of reduction in FEV1.    2.  There is no significant restrictive ventilatory defect.  The elevated   RV/TLC ratio is suggestive of air trapping.    3.  There is a mild diffusion impairment.  Reduced DLCO and normal DLCO/VA   ratio is consistent with obstructive airway disease and underestimation of the   alveolar volumes due to mild distribution of ventilation.    4.  Compared to prior testing in 2016, FEV1 has declined from 1.49 liters to   1.39 liters.    Echo (4/10/2019):   Prior echocardiogram 8/22/2014.  Normal left ventricular systolic function.   No evidence of valvular abnormality based on Doppler evaluation.   Ascending aorta diameter is 2.8 cm.  Compared to the images of the prior study done -  there has been no   significant change.     EKG (10/13/2017): Personally interpreted by me as sinus rhythm with LVH    Assessment:     1. HTN (hypertension), malignant  lisinopril (PRINIVIL) 40 MG tablet    dilTIAZem CD (CARDIZEM CD) 300 MG CAPSULE SR 24 HR    Lipid Profile    Comp Metabolic Panel      2. Family history of early CAD   rosuvastatin (CRESTOR) 10 MG Tab    Lipid Profile    Comp Metabolic Panel      3. Left ventricular hypertrophy  Lipid Profile    Comp Metabolic Panel      4. High risk medication use  Lipid Profile    Comp Metabolic Panel      5. Chronic obstructive pulmonary disease, unspecified COPD type (HCC)  Lipid Profile    Comp Metabolic Panel          Medical Decision Making:  Today's Assessment / Status / Plan:   Hypertension  -Continue diltiazem 300 mg daily  -Continue lisinopril 40 mg daily  -Hyperkalemic on  electrolyte panel.  Spironolactone discontinued.  -Today in office blood pressure is well controlled.    -Home blood pressure recordings are reported as 90/50's.  Encouraged to continue home BP monitoring/log.    Hyperlipidemia  -Continue baby aspirin  -Continue rosuvastatin 10 mg daily    T2DM; COPD  -PFT reviewed per above  -A1C 8.3%  -Per PCP    High risk medication use  -This includes diltiazem and lisinopril  -Will continue to closely monitor for side effects of patient's high risk medication(s) including  renal function and electrolytes  -Close monitoring discussed with patient.  Lab work ordered.    FU in clinic in 1 year with Dr. Flores. Sooner if needed.    Patient verbalizes understanding and agrees with the plan of care.     I personally spent a total of 31 minutes which includes face-to-face time and non-face-to-face time spent on preparing to see the patient, reviewing prior notes and tests, obtaining history from the patient, performing a medically appropriate exam, counseling and educating the patient, ordering medications/tests/procedures/referrals as clinically indicated, and documenting information in the electronic medical record.    MOLLY Mcgowan.   [facility]  [phone]    PLEASE NOTE: This Note was created using voice recognition Software. I have made every reasonable attempt to correct obvious errors, but I expect that there are errors of  grammar and possibly content that I did not discover before finalizing the note

## 2023-08-09 RX ORDER — FLURBIPROFEN SODIUM 0.3 MG/ML
SOLUTION/ DROPS OPHTHALMIC
Qty: 100 EACH | Refills: 1 | Status: SHIPPED | OUTPATIENT
Start: 2023-08-09

## 2023-10-29 DIAGNOSIS — Z79.4 TYPE 2 DIABETES MELLITUS WITHOUT COMPLICATION, WITH LONG-TERM CURRENT USE OF INSULIN (HCC): ICD-10-CM

## 2023-10-29 DIAGNOSIS — E11.9 TYPE 2 DIABETES MELLITUS WITHOUT COMPLICATION, WITH LONG-TERM CURRENT USE OF INSULIN (HCC): ICD-10-CM

## 2023-10-30 DIAGNOSIS — E11.9 TYPE 2 DIABETES MELLITUS WITHOUT COMPLICATION, WITH LONG-TERM CURRENT USE OF INSULIN (HCC): ICD-10-CM

## 2023-10-30 DIAGNOSIS — Z79.4 TYPE 2 DIABETES MELLITUS WITHOUT COMPLICATION, WITH LONG-TERM CURRENT USE OF INSULIN (HCC): ICD-10-CM

## 2023-10-30 RX ORDER — INSULIN GLARGINE 100 [IU]/ML
INJECTION, SOLUTION SUBCUTANEOUS
Qty: 15 ML | Refills: 1 | Status: CANCELLED | OUTPATIENT
Start: 2023-10-30

## 2023-10-30 RX ORDER — INSULIN GLARGINE 100 [IU]/ML
INJECTION, SOLUTION SUBCUTANEOUS
Qty: 15 ML | Refills: 1 | Status: SHIPPED | OUTPATIENT
Start: 2023-10-30

## 2024-02-26 RX ORDER — BUDESONIDE AND FORMOTEROL FUMARATE DIHYDRATE 160; 4.5 UG/1; UG/1
AEROSOL RESPIRATORY (INHALATION)
Qty: 30.6 G | Refills: 2 | Status: SHIPPED | OUTPATIENT
Start: 2024-02-26

## 2024-06-19 DIAGNOSIS — Z79.4 TYPE 2 DIABETES MELLITUS WITHOUT COMPLICATION, WITH LONG-TERM CURRENT USE OF INSULIN (HCC): ICD-10-CM

## 2024-06-19 DIAGNOSIS — E11.9 TYPE 2 DIABETES MELLITUS WITHOUT COMPLICATION, WITH LONG-TERM CURRENT USE OF INSULIN (HCC): ICD-10-CM

## 2024-06-19 RX ORDER — METFORMIN HYDROCHLORIDE 500 MG/1
TABLET, EXTENDED RELEASE ORAL
Qty: 180 TABLET | Refills: 0 | Status: SHIPPED | OUTPATIENT
Start: 2024-06-19

## 2024-06-19 NOTE — TELEPHONE ENCOUNTER
Received request via: Pharmacy    Was the patient seen in the last year in this department? Yes7/17/23    Does the patient have an active prescription (recently filled or refills available) for medication(s) requested? No    Pharmacy Name: [facility]    Does the patient have care home Plus and need 100 day supply (blood pressure, diabetes and cholesterol meds only)? Patient does not have SCP

## 2024-09-05 DIAGNOSIS — M51.369 DDD (DEGENERATIVE DISC DISEASE), LUMBAR: ICD-10-CM

## 2024-09-05 DIAGNOSIS — Z79.4 TYPE 2 DIABETES MELLITUS WITHOUT COMPLICATION, WITH LONG-TERM CURRENT USE OF INSULIN (HCC): ICD-10-CM

## 2024-09-05 DIAGNOSIS — E11.9 TYPE 2 DIABETES MELLITUS WITHOUT COMPLICATION, WITH LONG-TERM CURRENT USE OF INSULIN (HCC): ICD-10-CM

## 2024-09-05 DIAGNOSIS — I10 HTN (HYPERTENSION), MALIGNANT: ICD-10-CM

## 2024-09-05 RX ORDER — CYCLOBENZAPRINE HCL 10 MG
TABLET ORAL
Qty: 30 TABLET | Refills: 0 | Status: SHIPPED | OUTPATIENT
Start: 2024-09-05

## 2024-09-05 RX ORDER — FLURBIPROFEN SODIUM 0.3 MG/ML
SOLUTION/ DROPS OPHTHALMIC
Qty: 100 EACH | Refills: 0 | Status: SHIPPED | OUTPATIENT
Start: 2024-09-05

## 2024-09-05 RX ORDER — SERTRALINE HYDROCHLORIDE 100 MG/1
TABLET, FILM COATED ORAL
Qty: 30 TABLET | Refills: 0 | Status: SHIPPED | OUTPATIENT
Start: 2024-09-05

## 2024-09-05 RX ORDER — METFORMIN HCL 500 MG
TABLET, EXTENDED RELEASE 24 HR ORAL
Qty: 180 TABLET | Refills: 0 | Status: SHIPPED | OUTPATIENT
Start: 2024-09-05

## 2024-09-05 NOTE — TELEPHONE ENCOUNTER
Received request via: Pharmacy    Was the patient seen in the last year in this department? No7/17/2023    Does the patient have an active prescription (recently filled or refills available) for medication(s) requested? No    Pharmacy Name: [facility]    Does the patient have [facility] Plus and need 100-day supply? (This applies to ALL medications) Patient does not have SCP

## 2024-09-06 RX ORDER — LISINOPRIL 40 MG/1
40 TABLET ORAL
Qty: 90 TABLET | Refills: 0 | Status: SHIPPED | OUTPATIENT
Start: 2024-09-06

## 2024-09-06 NOTE — TELEPHONE ENCOUNTER
BMP ordered per protocol and mailed to the patient.       JG: Please refill appropriately, last potassium 5.1. Thanks!    Schedulers: Please call for annual FV. Thanks!

## 2024-09-06 NOTE — TELEPHONE ENCOUNTER
Is the patient due for a refill? Yes    Was the patient seen the past year? No    Date of last office visit: 08.03.2023    Does the patient have an upcoming appointment?  No    Provider to refill:YAYA    Does the patient have [facility] Plus and need 100-day supply? (This applies to ALL medications) Patient does not have SCP

## 2024-09-09 ENCOUNTER — TELEPHONE (OUTPATIENT)
Dept: CARDIOLOGY | Facility: MEDICAL CENTER | Age: 79
End: 2024-09-09
Payer: MEDICARE

## 2024-10-05 DIAGNOSIS — M51.369 DDD (DEGENERATIVE DISC DISEASE), LUMBAR: ICD-10-CM

## 2024-10-07 RX ORDER — CYCLOBENZAPRINE HCL 10 MG
TABLET ORAL
Qty: 30 TABLET | Refills: 0 | Status: SHIPPED | OUTPATIENT
Start: 2024-10-07

## 2024-10-07 RX ORDER — SERTRALINE HYDROCHLORIDE 100 MG/1
TABLET, FILM COATED ORAL
Qty: 30 TABLET | Refills: 0 | Status: SHIPPED | OUTPATIENT
Start: 2024-10-07

## 2024-10-19 DIAGNOSIS — Z82.49 FAMILY HISTORY OF EARLY CAD: ICD-10-CM

## 2024-10-24 RX ORDER — ROSUVASTATIN CALCIUM 10 MG/1
10 TABLET, COATED ORAL EVERY EVENING
Qty: 90 TABLET | Refills: 0 | Status: SHIPPED | OUTPATIENT
Start: 2024-10-24

## 2024-12-04 DIAGNOSIS — I10 HTN (HYPERTENSION), MALIGNANT: ICD-10-CM

## 2024-12-04 DIAGNOSIS — Z79.4 TYPE 2 DIABETES MELLITUS WITHOUT COMPLICATION, WITH LONG-TERM CURRENT USE OF INSULIN (HCC): ICD-10-CM

## 2024-12-04 DIAGNOSIS — Z82.49 FAMILY HISTORY OF EARLY CAD: ICD-10-CM

## 2024-12-04 DIAGNOSIS — E11.9 TYPE 2 DIABETES MELLITUS WITHOUT COMPLICATION, WITH LONG-TERM CURRENT USE OF INSULIN (HCC): ICD-10-CM

## 2024-12-04 RX ORDER — LISINOPRIL 40 MG/1
40 TABLET ORAL
Qty: 90 TABLET | Refills: 0 | OUTPATIENT
Start: 2024-12-04

## 2024-12-04 RX ORDER — ROSUVASTATIN CALCIUM 10 MG/1
10 TABLET, COATED ORAL EVERY EVENING
Qty: 90 TABLET | Refills: 0 | OUTPATIENT
Start: 2024-12-04

## 2024-12-04 RX ORDER — METFORMIN HYDROCHLORIDE 500 MG/1
TABLET, EXTENDED RELEASE ORAL
Qty: 60 TABLET | Refills: 0 | Status: SHIPPED | OUTPATIENT
Start: 2024-12-04

## 2024-12-29 DIAGNOSIS — M51.369 DDD (DEGENERATIVE DISC DISEASE), LUMBAR: ICD-10-CM

## 2024-12-30 RX ORDER — CYCLOBENZAPRINE HCL 10 MG
TABLET ORAL
Qty: 30 TABLET | Refills: 0 | Status: SHIPPED | OUTPATIENT
Start: 2024-12-30

## 2024-12-30 RX ORDER — SERTRALINE HYDROCHLORIDE 100 MG/1
TABLET, FILM COATED ORAL
Qty: 30 TABLET | Refills: 0 | Status: SHIPPED | OUTPATIENT
Start: 2024-12-30

## 2024-12-30 NOTE — TELEPHONE ENCOUNTER
Received request via: Pharmacy    Was the patient seen in the last year in this department? No    Does the patient have an active prescription (recently filled or refills available) for medication(s) requested? No    Pharmacy Name: [facility]    Does the patient have [facility] Plus and need 100-day supply? (This applies to ALL medications) Patient does not have SCP

## 2025-01-26 DIAGNOSIS — M51.369 DDD (DEGENERATIVE DISC DISEASE), LUMBAR: ICD-10-CM

## 2025-01-27 NOTE — TELEPHONE ENCOUNTER
Received request via: Pharmacy    Was the patient seen in the last year in this department? No  LOV : 7/17/2023   Does the patient have an active prescription (recently filled or refills available) for medication(s) requested? No    [facility] Name: [facility]     Does the patient have residential Plus and need 100-day supply? (This applies to ALL medications) Patient does not have SCP

## 2025-01-29 RX ORDER — SERTRALINE HYDROCHLORIDE 100 MG/1
TABLET, FILM COATED ORAL
Qty: 30 TABLET | Refills: 0 | Status: SHIPPED | OUTPATIENT
Start: 2025-01-29

## 2025-01-29 RX ORDER — CYCLOBENZAPRINE HCL 10 MG
TABLET ORAL
Qty: 30 TABLET | Refills: 0 | Status: SHIPPED | OUTPATIENT
Start: 2025-01-29

## 2025-02-10 DIAGNOSIS — I10 HTN (HYPERTENSION), MALIGNANT: ICD-10-CM

## 2025-02-10 RX ORDER — LISINOPRIL 40 MG/1
40 TABLET ORAL
Qty: 90 TABLET | Refills: 0 | OUTPATIENT
Start: 2025-02-10

## 2025-02-11 ENCOUNTER — TELEPHONE (OUTPATIENT)
Dept: CARDIOLOGY | Facility: MEDICAL CENTER | Age: 80
End: 2025-02-11
Payer: MEDICARE

## 2025-02-11 NOTE — TELEPHONE ENCOUNTER
Refill request for lisinopril denied. 90 day courtesy refill given 9/6/24. Last seen 8/3/23 by YAYA. [employer] message sent to pt.    To Scheduling - please contact pt to schedule follow up appointment. Thank you!

## 2025-02-26 DIAGNOSIS — E11.9 TYPE 2 DIABETES MELLITUS WITHOUT COMPLICATION, WITH LONG-TERM CURRENT USE OF INSULIN (HCC): ICD-10-CM

## 2025-02-26 DIAGNOSIS — Z79.4 TYPE 2 DIABETES MELLITUS WITHOUT COMPLICATION, WITH LONG-TERM CURRENT USE OF INSULIN (HCC): ICD-10-CM

## 2025-02-26 RX ORDER — METFORMIN HYDROCHLORIDE 500 MG/1
500 TABLET, EXTENDED RELEASE ORAL 2 TIMES DAILY
Qty: 60 TABLET | Refills: 0 | Status: SHIPPED | OUTPATIENT
Start: 2025-02-26

## 2025-03-24 NOTE — PATIENT INSTRUCTIONS
Clinic in 3 months with labs and US Stop spironolactone; Blood work in 2 weeks    Checking Blood Pressure:  -Blood pressure cuff, spend in the $40-65, with good return policy  -It should be automatic, upper arm, measure your arm to get the correct size, probably adult Large  -Put the cuff in place, rest arm on table near height of your heart, sit quietly for 5 min, legs uncrossed, with back support, then take your blood pressure, write it down, keep a log  -Check once a day. Ideally, 2 hours after medications.  -Can bring your cuff to at least one appointment where it can be calibrated to a manual cuff if you are concerned.  -Goal blood pressure is at least under 130/80, ideally under 120/80.  If you think your BP is overall too high, let us know in the office, we can adjust medications, can use MyChart or call the [employer] office: [phone].  -If you feel dizzy, please check your blood pressure.  If your blood pressure is less than 90/60 with dizziness call our office at [phone].    -Continue to monitor blood pressures, heart rates, and daily weights in log provided in office today. Please bring to next appointment to review with provider.     Salt=sodium=sea salt, guidelines say stay under 2,500 mg daily   Get salt smart, start looking at labels, count it up.  Salt is hidden in everything, salad dressing, sauces, cheese, most canned food, any processed meat.

## 2025-03-28 DIAGNOSIS — Z79.4 TYPE 2 DIABETES MELLITUS WITHOUT COMPLICATION, WITH LONG-TERM CURRENT USE OF INSULIN (HCC): ICD-10-CM

## 2025-03-28 DIAGNOSIS — E11.9 TYPE 2 DIABETES MELLITUS WITHOUT COMPLICATION, WITH LONG-TERM CURRENT USE OF INSULIN (HCC): ICD-10-CM

## 2025-03-31 RX ORDER — SERTRALINE HYDROCHLORIDE 100 MG/1
100 TABLET, FILM COATED ORAL DAILY
Qty: 30 TABLET | Refills: 0 | Status: SHIPPED | OUTPATIENT
Start: 2025-03-31

## 2025-03-31 RX ORDER — METFORMIN HYDROCHLORIDE 500 MG/1
500 TABLET, EXTENDED RELEASE ORAL 2 TIMES DAILY
Qty: 60 TABLET | Refills: 0 | Status: SHIPPED | OUTPATIENT
Start: 2025-03-31

## 2025-04-23 RX ORDER — BUDESONIDE AND FORMOTEROL FUMARATE DIHYDRATE 160; 4.5 UG/1; UG/1
2 AEROSOL RESPIRATORY (INHALATION) 2 TIMES DAILY
Qty: 30.6 G | Refills: 0 | Status: SHIPPED | OUTPATIENT
Start: 2025-04-23

## 2025-04-30 DIAGNOSIS — Z79.4 TYPE 2 DIABETES MELLITUS WITHOUT COMPLICATION, WITH LONG-TERM CURRENT USE OF INSULIN (HCC): ICD-10-CM

## 2025-04-30 DIAGNOSIS — E11.9 TYPE 2 DIABETES MELLITUS WITHOUT COMPLICATION, WITH LONG-TERM CURRENT USE OF INSULIN (HCC): ICD-10-CM

## 2025-04-30 NOTE — TELEPHONE ENCOUNTER
Received request via: Pharmacy    Was the patient seen in the last year in this department? No    Does the patient have an active prescription (recently filled or refills available) for medication(s) requested? No    Pharmacy Name: [facility]    Does the patient have [facility] Plus and need 100-day supply? (This applies to ALL medications) Patient does not have SCP

## 2025-05-01 RX ORDER — SERTRALINE HYDROCHLORIDE 100 MG/1
100 TABLET, FILM COATED ORAL DAILY
Qty: 30 TABLET | Refills: 0 | Status: SHIPPED | OUTPATIENT
Start: 2025-05-01

## 2025-05-01 RX ORDER — METFORMIN HYDROCHLORIDE 500 MG/1
500 TABLET, EXTENDED RELEASE ORAL 2 TIMES DAILY
Qty: 60 TABLET | Refills: 0 | Status: SHIPPED | OUTPATIENT
Start: 2025-05-01

## 2025-06-02 DIAGNOSIS — Z79.4 TYPE 2 DIABETES MELLITUS WITHOUT COMPLICATION, WITH LONG-TERM CURRENT USE OF INSULIN (HCC): ICD-10-CM

## 2025-06-02 DIAGNOSIS — E11.9 TYPE 2 DIABETES MELLITUS WITHOUT COMPLICATION, WITH LONG-TERM CURRENT USE OF INSULIN (HCC): ICD-10-CM

## 2025-06-03 RX ORDER — METFORMIN HYDROCHLORIDE 500 MG/1
500 TABLET, EXTENDED RELEASE ORAL 2 TIMES DAILY
Qty: 180 TABLET | Refills: 0 | Status: SHIPPED | OUTPATIENT
Start: 2025-06-03

## 2025-06-03 RX ORDER — SERTRALINE HYDROCHLORIDE 100 MG/1
100 TABLET, FILM COATED ORAL DAILY
Qty: 90 TABLET | Refills: 0 | Status: SHIPPED | OUTPATIENT
Start: 2025-06-03

## (undated) DEVICE — KNIFE STEP 1.1 (6EA/BX)

## (undated) DEVICE — ATOMIC EDGE ACCURATE DEPTH 550 MICRON (10/CA)

## (undated) DEVICE — SENSOR SPO2 NEO LNCS ADHESIVE (20/BX) SEE USER NOTES

## (undated) DEVICE — NEEDLE FILTER ASPIRATION 18 GA X 1 1/2 IN (100EA/BX)

## (undated) DEVICE — TIP POLYMER I&A

## (undated) DEVICE — Device

## (undated) DEVICE — EXTRACTOR CORTEX ANGL LT 26GA - (10/BX) BINKHORST

## (undated) DEVICE — CANNULA INJECTION 27G (EYE) - 10/BX ALCON

## (undated) DEVICE — SYRINGE SAFETY 3 ML 18 GA X 1 1/2 BLUNT LL (100/BX 8BX/CA)

## (undated) DEVICE — GLOVE BIOGEL SURGICAL PF LATEX M SIZE 8.5 (50PR/BX 4BX/CA)

## (undated) DEVICE — KIT  I.V. START (100EA/CA)

## (undated) DEVICE — CON SEDATION/>5 YR 1ST 15 MIN

## (undated) DEVICE — NEEDLE CYSTOTOME OPTH VSTC  0.4MM X 16MM - (10/CA)

## (undated) DEVICE — TUBING CLEARLINK DUO-VENT - C-FLO (48EA/CA)

## (undated) DEVICE — CATHETER IV 20 GA X 1-1/4 ---SURG.& SDS ONLY--- (50EA/BX)

## (undated) DEVICE — SET LEADWIRE 5 LEAD BEDSIDE DISPOSABLE ECG (1SET OF 5/EA)

## (undated) DEVICE — CANNULA DIVIDED ADULT CO2 - SAMPLE W/FEMALE CONNCT (25/CA)

## (undated) DEVICE — ELECTRODE 850 FOAM ADHESIVE - HYDROGEL RADIOTRNSPRNT (50/PK)